# Patient Record
Sex: FEMALE | Race: WHITE | NOT HISPANIC OR LATINO | Employment: UNEMPLOYED | URBAN - METROPOLITAN AREA
[De-identification: names, ages, dates, MRNs, and addresses within clinical notes are randomized per-mention and may not be internally consistent; named-entity substitution may affect disease eponyms.]

---

## 2023-08-10 ENCOUNTER — HOSPITAL ENCOUNTER (INPATIENT)
Facility: HOSPITAL | Age: 63
LOS: 8 days | Discharge: NURSING, REHAB OR LONG-TERM CARE | DRG: 493 | End: 2023-08-18
Attending: EMERGENCY MEDICINE | Admitting: STUDENT IN AN ORGANIZED HEALTH CARE EDUCATION/TRAINING PROGRAM
Payer: COMMERCIAL

## 2023-08-10 ENCOUNTER — APPOINTMENT (OUTPATIENT)
Dept: RADIOLOGY | Facility: HOSPITAL | Age: 63
DRG: 493 | End: 2023-08-10
Attending: EMERGENCY MEDICINE
Payer: COMMERCIAL

## 2023-08-10 ENCOUNTER — APPOINTMENT (OUTPATIENT)
Dept: RADIOLOGY | Facility: HOSPITAL | Age: 63
DRG: 493 | End: 2023-08-10
Attending: STUDENT IN AN ORGANIZED HEALTH CARE EDUCATION/TRAINING PROGRAM
Payer: COMMERCIAL

## 2023-08-10 DIAGNOSIS — S82.841A CLOSED BIMALLEOLAR FRACTURE OF RIGHT ANKLE, INITIAL ENCOUNTER: ICD-10-CM

## 2023-08-10 PROBLEM — S82.891A CLOSED RIGHT ANKLE FRACTURE: Status: ACTIVE | Noted: 2023-08-10

## 2023-08-10 LAB
ABO/RH(D): NORMAL
ALBUMIN SERPL BCG-MCNC: 3.8 G/DL (ref 3.5–5.2)
ALP SERPL-CCNC: 51 U/L (ref 35–104)
ALT SERPL W P-5'-P-CCNC: 15 U/L (ref 0–50)
AMPHETAMINES UR QL SCN: ABNORMAL
ANION GAP SERPL CALCULATED.3IONS-SCNC: 11 MMOL/L (ref 7–15)
ANION GAP SERPL CALCULATED.3IONS-SCNC: 13 MMOL/L (ref 7–15)
ANTIBODY SCREEN: NEGATIVE
AST SERPL W P-5'-P-CCNC: 33 U/L (ref 0–45)
BARBITURATES UR QL SCN: ABNORMAL
BASOPHILS # BLD AUTO: 0.1 10E3/UL (ref 0–0.2)
BASOPHILS NFR BLD AUTO: 1 %
BENZODIAZ UR QL SCN: ABNORMAL
BILIRUB SERPL-MCNC: 0.3 MG/DL
BUN SERPL-MCNC: 27.5 MG/DL (ref 8–23)
BUN SERPL-MCNC: 27.7 MG/DL (ref 8–23)
BZE UR QL SCN: ABNORMAL
CALCIUM SERPL-MCNC: 10.2 MG/DL (ref 8.8–10.2)
CALCIUM SERPL-MCNC: 9.5 MG/DL (ref 8.8–10.2)
CANNABINOIDS UR QL SCN: ABNORMAL
CHLORIDE SERPL-SCNC: 102 MMOL/L (ref 98–107)
CHLORIDE SERPL-SCNC: 103 MMOL/L (ref 98–107)
CREAT SERPL-MCNC: 1.09 MG/DL (ref 0.51–0.95)
CREAT SERPL-MCNC: 1.13 MG/DL (ref 0.51–0.95)
DEPRECATED HCO3 PLAS-SCNC: 21 MMOL/L (ref 22–29)
DEPRECATED HCO3 PLAS-SCNC: 25 MMOL/L (ref 22–29)
EOSINOPHIL # BLD AUTO: 0 10E3/UL (ref 0–0.7)
EOSINOPHIL NFR BLD AUTO: 0 %
ERYTHROCYTE [DISTWIDTH] IN BLOOD BY AUTOMATED COUNT: 14.2 % (ref 10–15)
ERYTHROCYTE [DISTWIDTH] IN BLOOD BY AUTOMATED COUNT: 14.2 % (ref 10–15)
ETHANOL SERPL-MCNC: <0.01 G/DL
GFR SERPL CREATININE-BSD FRML MDRD: 55 ML/MIN/1.73M2
GFR SERPL CREATININE-BSD FRML MDRD: 58 ML/MIN/1.73M2
GLUCOSE SERPL-MCNC: 104 MG/DL (ref 70–99)
GLUCOSE SERPL-MCNC: 105 MG/DL (ref 70–99)
HCT VFR BLD AUTO: 31.3 % (ref 35–47)
HCT VFR BLD AUTO: 33.7 % (ref 35–47)
HGB BLD-MCNC: 10.1 G/DL (ref 11.7–15.7)
HGB BLD-MCNC: 10.9 G/DL (ref 11.7–15.7)
IMM GRANULOCYTES # BLD: 0 10E3/UL
IMM GRANULOCYTES NFR BLD: 0 %
INR PPP: 0.96 (ref 0.85–1.15)
INR PPP: 1 (ref 0.85–1.15)
LYMPHOCYTES # BLD AUTO: 1.4 10E3/UL (ref 0.8–5.3)
LYMPHOCYTES NFR BLD AUTO: 14 %
MCH RBC QN AUTO: 31.1 PG (ref 26.5–33)
MCH RBC QN AUTO: 31.2 PG (ref 26.5–33)
MCHC RBC AUTO-ENTMCNC: 32.3 G/DL (ref 31.5–36.5)
MCHC RBC AUTO-ENTMCNC: 32.3 G/DL (ref 31.5–36.5)
MCV RBC AUTO: 96 FL (ref 78–100)
MCV RBC AUTO: 97 FL (ref 78–100)
MONOCYTES # BLD AUTO: 0.6 10E3/UL (ref 0–1.3)
MONOCYTES NFR BLD AUTO: 6 %
NEUTROPHILS # BLD AUTO: 7.5 10E3/UL (ref 1.6–8.3)
NEUTROPHILS NFR BLD AUTO: 79 %
NRBC # BLD AUTO: 0 10E3/UL
NRBC BLD AUTO-RTO: 0 /100
OPIATES UR QL SCN: ABNORMAL
PCP QUAL URINE (ROCHE): ABNORMAL
PLATELET # BLD AUTO: 282 10E3/UL (ref 150–450)
PLATELET # BLD AUTO: 305 10E3/UL (ref 150–450)
POTASSIUM SERPL-SCNC: 3.7 MMOL/L (ref 3.4–5.3)
POTASSIUM SERPL-SCNC: 4.6 MMOL/L (ref 3.4–5.3)
PROT SERPL-MCNC: 6.5 G/DL (ref 6.4–8.3)
RBC # BLD AUTO: 3.25 10E6/UL (ref 3.8–5.2)
RBC # BLD AUTO: 3.49 10E6/UL (ref 3.8–5.2)
SARS-COV-2 RNA RESP QL NAA+PROBE: NEGATIVE
SODIUM SERPL-SCNC: 136 MMOL/L (ref 136–145)
SODIUM SERPL-SCNC: 139 MMOL/L (ref 136–145)
SPECIMEN EXPIRATION DATE: NORMAL
WBC # BLD AUTO: 11.3 10E3/UL (ref 4–11)
WBC # BLD AUTO: 9.6 10E3/UL (ref 4–11)

## 2023-08-10 PROCEDURE — 85610 PROTHROMBIN TIME: CPT | Performed by: EMERGENCY MEDICINE

## 2023-08-10 PROCEDURE — 2W3QX1Z IMMOBILIZATION OF RIGHT LOWER LEG USING SPLINT: ICD-10-PCS | Performed by: EMERGENCY MEDICINE

## 2023-08-10 PROCEDURE — 258N000003 HC RX IP 258 OP 636: Performed by: STUDENT IN AN ORGANIZED HEALTH CARE EDUCATION/TRAINING PROGRAM

## 2023-08-10 PROCEDURE — 85610 PROTHROMBIN TIME: CPT | Performed by: STUDENT IN AN ORGANIZED HEALTH CARE EDUCATION/TRAINING PROGRAM

## 2023-08-10 PROCEDURE — 36415 COLL VENOUS BLD VENIPUNCTURE: CPT | Performed by: EMERGENCY MEDICINE

## 2023-08-10 PROCEDURE — 250N000013 HC RX MED GY IP 250 OP 250 PS 637

## 2023-08-10 PROCEDURE — 250N000011 HC RX IP 250 OP 636

## 2023-08-10 PROCEDURE — 250N000011 HC RX IP 250 OP 636: Mod: JZ | Performed by: STUDENT IN AN ORGANIZED HEALTH CARE EDUCATION/TRAINING PROGRAM

## 2023-08-10 PROCEDURE — 73552 X-RAY EXAM OF FEMUR 2/>: CPT | Mod: RT

## 2023-08-10 PROCEDURE — 999N000157 HC STATISTIC RCP TIME EA 10 MIN

## 2023-08-10 PROCEDURE — 80307 DRUG TEST PRSMV CHEM ANLYZR: CPT | Performed by: EMERGENCY MEDICINE

## 2023-08-10 PROCEDURE — 73590 X-RAY EXAM OF LOWER LEG: CPT | Mod: RT

## 2023-08-10 PROCEDURE — 85027 COMPLETE CBC AUTOMATED: CPT | Performed by: STUDENT IN AN ORGANIZED HEALTH CARE EDUCATION/TRAINING PROGRAM

## 2023-08-10 PROCEDURE — 99222 1ST HOSP IP/OBS MODERATE 55: CPT | Performed by: STUDENT IN AN ORGANIZED HEALTH CARE EDUCATION/TRAINING PROGRAM

## 2023-08-10 PROCEDURE — 999N000065 XR ANKLE RIGHT G/E 3 VIEWS: Mod: RT

## 2023-08-10 PROCEDURE — 82077 ASSAY SPEC XCP UR&BREATH IA: CPT | Performed by: EMERGENCY MEDICINE

## 2023-08-10 PROCEDURE — 80053 COMPREHEN METABOLIC PANEL: CPT | Performed by: EMERGENCY MEDICINE

## 2023-08-10 PROCEDURE — 250N000013 HC RX MED GY IP 250 OP 250 PS 637: Performed by: HOSPITALIST

## 2023-08-10 PROCEDURE — C9803 HOPD COVID-19 SPEC COLLECT: HCPCS

## 2023-08-10 PROCEDURE — 36415 COLL VENOUS BLD VENIPUNCTURE: CPT | Performed by: STUDENT IN AN ORGANIZED HEALTH CARE EDUCATION/TRAINING PROGRAM

## 2023-08-10 PROCEDURE — 71045 X-RAY EXAM CHEST 1 VIEW: CPT

## 2023-08-10 PROCEDURE — 85025 COMPLETE CBC W/AUTO DIFF WBC: CPT | Performed by: EMERGENCY MEDICINE

## 2023-08-10 PROCEDURE — 87635 SARS-COV-2 COVID-19 AMP PRB: CPT | Performed by: EMERGENCY MEDICINE

## 2023-08-10 PROCEDURE — 250N000011 HC RX IP 250 OP 636: Performed by: FAMILY MEDICINE

## 2023-08-10 PROCEDURE — 99285 EMERGENCY DEPT VISIT HI MDM: CPT | Mod: 25

## 2023-08-10 PROCEDURE — 73610 X-RAY EXAM OF ANKLE: CPT | Mod: RT

## 2023-08-10 PROCEDURE — 250N000013 HC RX MED GY IP 250 OP 250 PS 637: Performed by: STUDENT IN AN ORGANIZED HEALTH CARE EDUCATION/TRAINING PROGRAM

## 2023-08-10 PROCEDURE — 86850 RBC ANTIBODY SCREEN: CPT | Performed by: STUDENT IN AN ORGANIZED HEALTH CARE EDUCATION/TRAINING PROGRAM

## 2023-08-10 PROCEDURE — 73502 X-RAY EXAM HIP UNI 2-3 VIEWS: CPT

## 2023-08-10 PROCEDURE — 93005 ELECTROCARDIOGRAM TRACING: CPT | Performed by: STUDENT IN AN ORGANIZED HEALTH CARE EDUCATION/TRAINING PROGRAM

## 2023-08-10 PROCEDURE — 86901 BLOOD TYPING SEROLOGIC RH(D): CPT | Performed by: STUDENT IN AN ORGANIZED HEALTH CARE EDUCATION/TRAINING PROGRAM

## 2023-08-10 PROCEDURE — 29515 APPLICATION SHORT LEG SPLINT: CPT | Mod: RT

## 2023-08-10 PROCEDURE — 120N000001 HC R&B MED SURG/OB

## 2023-08-10 PROCEDURE — 250N000011 HC RX IP 250 OP 636: Performed by: EMERGENCY MEDICINE

## 2023-08-10 RX ORDER — FAMOTIDINE 20 MG/1
1 TABLET, FILM COATED ORAL AT BEDTIME
COMMUNITY
Start: 2023-06-26

## 2023-08-10 RX ORDER — NALOXONE HYDROCHLORIDE 0.4 MG/ML
0.2 INJECTION, SOLUTION INTRAMUSCULAR; INTRAVENOUS; SUBCUTANEOUS
Status: DISCONTINUED | OUTPATIENT
Start: 2023-08-10 | End: 2023-08-18 | Stop reason: HOSPADM

## 2023-08-10 RX ORDER — PROCHLORPERAZINE MALEATE 10 MG
10 TABLET ORAL EVERY 6 HOURS PRN
Status: DISCONTINUED | OUTPATIENT
Start: 2023-08-10 | End: 2023-08-15

## 2023-08-10 RX ORDER — ATORVASTATIN CALCIUM 20 MG/1
1 TABLET, FILM COATED ORAL DAILY
COMMUNITY
Start: 2023-08-09

## 2023-08-10 RX ORDER — NICOTINE 21 MG/24HR
1 PATCH, TRANSDERMAL 24 HOURS TRANSDERMAL DAILY
Status: DISCONTINUED | OUTPATIENT
Start: 2023-08-10 | End: 2023-08-10

## 2023-08-10 RX ORDER — NALOXONE HYDROCHLORIDE 0.4 MG/ML
0.4 INJECTION, SOLUTION INTRAMUSCULAR; INTRAVENOUS; SUBCUTANEOUS
Status: DISCONTINUED | OUTPATIENT
Start: 2023-08-10 | End: 2023-08-18 | Stop reason: HOSPADM

## 2023-08-10 RX ORDER — HYDRALAZINE HYDROCHLORIDE 20 MG/ML
10 INJECTION INTRAMUSCULAR; INTRAVENOUS EVERY 4 HOURS PRN
Status: DISCONTINUED | OUTPATIENT
Start: 2023-08-10 | End: 2023-08-18 | Stop reason: HOSPADM

## 2023-08-10 RX ORDER — FLUTICASONE PROPIONATE AND SALMETEROL 250; 50 UG/1; UG/1
1 POWDER RESPIRATORY (INHALATION) DAILY PRN
COMMUNITY
Start: 2023-06-26

## 2023-08-10 RX ORDER — OXYCODONE HYDROCHLORIDE 5 MG/1
10 TABLET ORAL EVERY 4 HOURS PRN
Status: DISCONTINUED | OUTPATIENT
Start: 2023-08-10 | End: 2023-08-11

## 2023-08-10 RX ORDER — BUSPIRONE HYDROCHLORIDE 15 MG/1
15 TABLET ORAL 2 TIMES DAILY
Status: DISCONTINUED | OUTPATIENT
Start: 2023-08-10 | End: 2023-08-18 | Stop reason: HOSPADM

## 2023-08-10 RX ORDER — BUSPIRONE HYDROCHLORIDE 15 MG/1
1 TABLET ORAL 2 TIMES DAILY
COMMUNITY

## 2023-08-10 RX ORDER — HYDROMORPHONE HCL IN WATER/PF 6 MG/30 ML
0.4 PATIENT CONTROLLED ANALGESIA SYRINGE INTRAVENOUS
Status: DISCONTINUED | OUTPATIENT
Start: 2023-08-10 | End: 2023-08-11

## 2023-08-10 RX ORDER — ACETAMINOPHEN 325 MG/1
650 TABLET ORAL EVERY 4 HOURS PRN
Status: DISCONTINUED | OUTPATIENT
Start: 2023-08-13 | End: 2023-08-11

## 2023-08-10 RX ORDER — HYDROMORPHONE HCL IN WATER/PF 6 MG/30 ML
0.2 PATIENT CONTROLLED ANALGESIA SYRINGE INTRAVENOUS
Status: DISCONTINUED | OUTPATIENT
Start: 2023-08-10 | End: 2023-08-11

## 2023-08-10 RX ORDER — CELECOXIB 200 MG/1
1 CAPSULE ORAL DAILY
Status: ON HOLD | COMMUNITY
Start: 2023-06-26 | End: 2023-08-17

## 2023-08-10 RX ORDER — PROCHLORPERAZINE 25 MG
25 SUPPOSITORY, RECTAL RECTAL EVERY 12 HOURS PRN
Status: DISCONTINUED | OUTPATIENT
Start: 2023-08-10 | End: 2023-08-15

## 2023-08-10 RX ORDER — BACLOFEN 10 MG/1
1 TABLET ORAL 3 TIMES DAILY
COMMUNITY

## 2023-08-10 RX ORDER — PRAMIPEXOLE DIHYDROCHLORIDE 0.12 MG/1
1 TABLET ORAL 2 TIMES DAILY PRN
COMMUNITY
Start: 2023-07-08

## 2023-08-10 RX ORDER — FENTANYL CITRATE 50 UG/ML
INJECTION, SOLUTION INTRAMUSCULAR; INTRAVENOUS
Status: COMPLETED
Start: 2023-08-10 | End: 2023-08-10

## 2023-08-10 RX ORDER — ACETAMINOPHEN 500 MG
2 TABLET ORAL 2 TIMES DAILY PRN
Status: ON HOLD | COMMUNITY
End: 2023-08-16

## 2023-08-10 RX ORDER — ZOLPIDEM TARTRATE 5 MG/1
10 TABLET ORAL
Status: COMPLETED | OUTPATIENT
Start: 2023-08-10 | End: 2023-08-10

## 2023-08-10 RX ORDER — FENTANYL CITRATE 50 UG/ML
25 INJECTION, SOLUTION INTRAMUSCULAR; INTRAVENOUS ONCE
Status: COMPLETED | OUTPATIENT
Start: 2023-08-10 | End: 2023-08-10

## 2023-08-10 RX ORDER — IBUPROFEN 200 MG
3 TABLET ORAL DAILY PRN
Status: ON HOLD | COMMUNITY
End: 2023-08-17

## 2023-08-10 RX ORDER — LIDOCAINE 40 MG/G
CREAM TOPICAL
Status: DISCONTINUED | OUTPATIENT
Start: 2023-08-10 | End: 2023-08-15

## 2023-08-10 RX ORDER — DULOXETIN HYDROCHLORIDE 60 MG/1
1 CAPSULE, DELAYED RELEASE ORAL 2 TIMES DAILY
COMMUNITY
Start: 2023-06-26

## 2023-08-10 RX ORDER — PROPOFOL 10 MG/ML
20 INJECTION, EMULSION INTRAVENOUS
Status: DISCONTINUED | OUTPATIENT
Start: 2023-08-10 | End: 2023-08-12

## 2023-08-10 RX ORDER — BUSPIRONE HYDROCHLORIDE 15 MG/1
15 TABLET ORAL 2 TIMES DAILY
Status: DISCONTINUED | OUTPATIENT
Start: 2023-08-10 | End: 2023-08-10

## 2023-08-10 RX ORDER — BACLOFEN 10 MG/1
10 TABLET ORAL 3 TIMES DAILY
Status: DISCONTINUED | OUTPATIENT
Start: 2023-08-10 | End: 2023-08-18 | Stop reason: HOSPADM

## 2023-08-10 RX ORDER — ONDANSETRON 2 MG/ML
4 INJECTION INTRAMUSCULAR; INTRAVENOUS EVERY 6 HOURS PRN
Status: DISCONTINUED | OUTPATIENT
Start: 2023-08-10 | End: 2023-08-12

## 2023-08-10 RX ORDER — GABAPENTIN 300 MG/1
600 CAPSULE ORAL 3 TIMES DAILY
Status: DISCONTINUED | OUTPATIENT
Start: 2023-08-10 | End: 2023-08-12

## 2023-08-10 RX ORDER — SODIUM CHLORIDE 9 MG/ML
INJECTION, SOLUTION INTRAVENOUS CONTINUOUS
Status: DISCONTINUED | OUTPATIENT
Start: 2023-08-10 | End: 2023-08-11

## 2023-08-10 RX ORDER — ACETAMINOPHEN 325 MG/1
975 TABLET ORAL EVERY 8 HOURS
Status: DISCONTINUED | OUTPATIENT
Start: 2023-08-10 | End: 2023-08-11

## 2023-08-10 RX ORDER — ALBUTEROL SULFATE 90 UG/1
1 AEROSOL, METERED RESPIRATORY (INHALATION) EVERY 6 HOURS PRN
COMMUNITY
Start: 2023-06-26

## 2023-08-10 RX ORDER — GABAPENTIN 300 MG/1
2 CAPSULE ORAL 3 TIMES DAILY
Status: ON HOLD | COMMUNITY
End: 2023-08-17

## 2023-08-10 RX ORDER — OXYCODONE HYDROCHLORIDE 5 MG/1
5 TABLET ORAL EVERY 4 HOURS PRN
Status: DISCONTINUED | OUTPATIENT
Start: 2023-08-10 | End: 2023-08-11

## 2023-08-10 RX ORDER — LORATADINE 10 MG/1
1 TABLET ORAL DAILY
COMMUNITY
Start: 2023-03-05

## 2023-08-10 RX ORDER — ONDANSETRON 2 MG/ML
4 INJECTION INTRAMUSCULAR; INTRAVENOUS ONCE
Status: COMPLETED | OUTPATIENT
Start: 2023-08-10 | End: 2023-08-10

## 2023-08-10 RX ORDER — ONDANSETRON 4 MG/1
4 TABLET, ORALLY DISINTEGRATING ORAL EVERY 6 HOURS PRN
Status: DISCONTINUED | OUTPATIENT
Start: 2023-08-10 | End: 2023-08-16

## 2023-08-10 RX ORDER — ZOLPIDEM TARTRATE 10 MG/1
1 TABLET ORAL
Status: ON HOLD | COMMUNITY
Start: 2023-08-04 | End: 2023-08-18

## 2023-08-10 RX ORDER — PROPOFOL 10 MG/ML
60 INJECTION, EMULSION INTRAVENOUS ONCE
Status: COMPLETED | OUTPATIENT
Start: 2023-08-10 | End: 2023-08-10

## 2023-08-10 RX ORDER — POLYETHYLENE GLYCOL 3350 17 G/17G
17 POWDER, FOR SOLUTION ORAL DAILY PRN
Status: DISCONTINUED | OUTPATIENT
Start: 2023-08-10 | End: 2023-08-12

## 2023-08-10 RX ORDER — FENTANYL CITRATE 50 UG/ML
50 INJECTION, SOLUTION INTRAMUSCULAR; INTRAVENOUS ONCE
Status: COMPLETED | OUTPATIENT
Start: 2023-08-10 | End: 2023-08-10

## 2023-08-10 RX ORDER — NICOTINE 21 MG/24HR
1 PATCH, TRANSDERMAL 24 HOURS TRANSDERMAL DAILY
Status: DISCONTINUED | OUTPATIENT
Start: 2023-08-10 | End: 2023-08-18 | Stop reason: HOSPADM

## 2023-08-10 RX ORDER — BETAMETHASONE DIPROPIONATE 0.05 %
OINTMENT (GRAM) TOPICAL 2 TIMES DAILY
Status: ON HOLD | COMMUNITY
Start: 2023-07-05 | End: 2023-08-18

## 2023-08-10 RX ORDER — VITAMIN B COMPLEX
25 TABLET ORAL DAILY
Status: DISCONTINUED | OUTPATIENT
Start: 2023-08-10 | End: 2023-08-18 | Stop reason: HOSPADM

## 2023-08-10 RX ADMIN — BACLOFEN 10 MG: 10 TABLET ORAL at 18:05

## 2023-08-10 RX ADMIN — PROPOFOL 60 MG: 10 INJECTION, EMULSION INTRAVENOUS at 14:06

## 2023-08-10 RX ADMIN — HYDROMORPHONE HYDROCHLORIDE 0.2 MG: 0.2 INJECTION, SOLUTION INTRAMUSCULAR; INTRAVENOUS; SUBCUTANEOUS at 08:25

## 2023-08-10 RX ADMIN — HYDROMORPHONE HYDROCHLORIDE 0.4 MG: 0.2 INJECTION, SOLUTION INTRAMUSCULAR; INTRAVENOUS; SUBCUTANEOUS at 13:06

## 2023-08-10 RX ADMIN — ACETAMINOPHEN 975 MG: 325 TABLET ORAL at 05:27

## 2023-08-10 RX ADMIN — OXYCODONE HYDROCHLORIDE 10 MG: 5 TABLET ORAL at 10:20

## 2023-08-10 RX ADMIN — NICOTINE 1 PATCH: 14 PATCH, EXTENDED RELEASE TRANSDERMAL at 14:25

## 2023-08-10 RX ADMIN — BUSPIRONE HYDROCHLORIDE 15 MG: 15 TABLET ORAL at 21:09

## 2023-08-10 RX ADMIN — ACETAMINOPHEN 975 MG: 325 TABLET ORAL at 14:25

## 2023-08-10 RX ADMIN — PROPOFOL 20 MG: 10 INJECTION, EMULSION INTRAVENOUS at 13:42

## 2023-08-10 RX ADMIN — OXYCODONE HYDROCHLORIDE 10 MG: 5 TABLET ORAL at 16:39

## 2023-08-10 RX ADMIN — OXYCODONE HYDROCHLORIDE 5 MG: 5 TABLET ORAL at 03:20

## 2023-08-10 RX ADMIN — SODIUM CHLORIDE: 9 INJECTION, SOLUTION INTRAVENOUS at 14:27

## 2023-08-10 RX ADMIN — FENTANYL CITRATE 25 MCG: 50 INJECTION, SOLUTION INTRAMUSCULAR; INTRAVENOUS at 00:49

## 2023-08-10 RX ADMIN — HYDROMORPHONE HYDROCHLORIDE 0.4 MG: 0.2 INJECTION, SOLUTION INTRAMUSCULAR; INTRAVENOUS; SUBCUTANEOUS at 10:51

## 2023-08-10 RX ADMIN — FENTANYL CITRATE 50 MCG: 50 INJECTION, SOLUTION INTRAMUSCULAR; INTRAVENOUS at 00:42

## 2023-08-10 RX ADMIN — PROPOFOL 20 MG: 10 INJECTION, EMULSION INTRAVENOUS at 13:41

## 2023-08-10 RX ADMIN — HYDROMORPHONE HYDROCHLORIDE 0.4 MG: 0.2 INJECTION, SOLUTION INTRAMUSCULAR; INTRAVENOUS; SUBCUTANEOUS at 05:27

## 2023-08-10 RX ADMIN — HYDROMORPHONE HYDROCHLORIDE 0.4 MG: 0.2 INJECTION, SOLUTION INTRAMUSCULAR; INTRAVENOUS; SUBCUTANEOUS at 23:31

## 2023-08-10 RX ADMIN — GABAPENTIN 600 MG: 300 CAPSULE ORAL at 21:09

## 2023-08-10 RX ADMIN — Medication 25 MCG: at 18:05

## 2023-08-10 RX ADMIN — OXYCODONE HYDROCHLORIDE 10 MG: 5 TABLET ORAL at 21:09

## 2023-08-10 RX ADMIN — HYDROMORPHONE HYDROCHLORIDE 0.2 MG: 0.2 INJECTION, SOLUTION INTRAMUSCULAR; INTRAVENOUS; SUBCUTANEOUS at 03:20

## 2023-08-10 RX ADMIN — BACLOFEN 10 MG: 10 TABLET ORAL at 22:23

## 2023-08-10 RX ADMIN — ACETAMINOPHEN 975 MG: 325 TABLET ORAL at 21:09

## 2023-08-10 RX ADMIN — SODIUM CHLORIDE: 9 INJECTION, SOLUTION INTRAVENOUS at 05:27

## 2023-08-10 RX ADMIN — ZOLPIDEM TARTRATE 10 MG: 5 TABLET ORAL at 22:23

## 2023-08-10 ASSESSMENT — ACTIVITIES OF DAILY LIVING (ADL)
ADLS_ACUITY_SCORE: 37
ADLS_ACUITY_SCORE: 41
ADLS_ACUITY_SCORE: 35
ADLS_ACUITY_SCORE: 35
ADLS_ACUITY_SCORE: 37
ADLS_ACUITY_SCORE: 35
ADLS_ACUITY_SCORE: 41
ADLS_ACUITY_SCORE: 35

## 2023-08-10 NOTE — MEDICATION SCRIBE - ADMISSION MEDICATION HISTORY
Medication Scribe Admission Medication History    Admission medication history is complete. The information provided in this note is only as accurate as the sources available at the time of the update.    Medication reconciliation/reorder completed by provider prior to medication history? No    Information Source(s): Patient via in-person    Pertinent Information:     Changes made to PTA medication list:  Added: Gabapentin 300 mg capsule, Baclofen 10 mg tablet, Buspirone 15 mg tablet, Vitamin D3 25 mcg (per outside fill history and patient confirmed)  Deleted: Duloxetine (per patinet)  Changed: None    Medication Affordability:  Not including over the counter (OTC) medications, was there a time in the past 3 months when you did not take your medications as prescribed because of cost?: No    Allergies reviewed with patient and updates made in EHR: yes    Medication History Completed By: Birdie Tariq 8/10/2023 3:22 AM    Prior to Admission medications    Medication Sig Last Dose Taking? Auth Provider Long Term End Date   baclofen (LIORESAL) 10 MG tablet Take 10 mg by mouth 3 times daily 8/9/2023 at pm Yes Reported, Patient     busPIRone (BUSPAR) 15 MG tablet Take 15 mg by mouth 3 times daily 8/9/2023 at pm Yes Reported, Patient Yes    cholecalciferol (VITAMIN D3) 25 mcg (1000 units) capsule Take 1 capsule by mouth daily 8/9/2023 at am Yes Reported, Patient     gabapentin (NEURONTIN) 300 MG capsule Take 300 mg by mouth 3 times daily 8/9/2023 at pm Yes Reported, Patient Yes

## 2023-08-10 NOTE — H&P
[facility]    History and Physical - Hospitalist Service       Date of Admission:  8/10/2023    Assessment & Plan      Everett Dixon is a 63 year old female admitted on 8/10/2023. She presented to the ER after a mechanical fall accident.  Right ankle x-ray with displaced bimalleolar ankle fracture with lateral talar shift and angulation.    Mechanical fall  Right foot pain  Closed right ankle fracture  -Sustained a mechanical fall accident in her niece's house while getting out of bathroom she was tipped and fell.  Right ankle x-ray significant for displaced bimalleolar ankle fracture  -Had a remote history of left ankle fracture in 2003  -[facility] has been contacted by ER attending  -Denies having history of heart disease, denies having chest pain currently.  EKG is done.  -She can undergo surgery with no additional tests  -Pain control with Tylenol, oral oxycodone and IV Dilaudid  -N.p.o.    Active smoker  -Counseling, nicotine patch or gum       Diet:  N.p.o.  DVT Prophylaxis: Pneumatic Compression Devices  [place] Catheter: Not present  Lines: None     Cardiac Monitoring: None  Code Status:  Full code    Clinically Significant Risk Factors Present on Admission                                Disposition Plan      Expected Discharge Date: 08/11/2023                  Madi Mc MD  Hospitalist Service  [facility]  Securely message with [employer] (more info)  Text page via [employer] Paging/Directory     ______________________________________________________________________    Chief Complaint   Right foot pain/few hours    History is obtained from the patient and a relative who was present at the bedside    History of Present Illness   Everett Dixon is a 63 year old female with past medical history significant for remote history of bilateral PE, left ankle fracture, active smoker.  She presented with the above complaint.  Patient stated that she tripped on the  floor when she gets out of a bathroom.  She denies hitting her head.  X-ray revealed right displaced bimalleolar ankle fracture.  Ortho  team has been contacted.  And will be admitted for further management.      Past Medical History    History reviewed. No pertinent past medical history.    Past Surgical History   History reviewed. No pertinent surgical history.    Prior to Admission Medications   Prior to Admission Medications   Prescriptions Last Dose Informant Patient Reported? Taking?   DULOXETINE HCL PO   Yes Yes      Facility-Administered Medications: None           Physical Exam   Vital Signs: Temp: 97.8  F (36.6  C)   BP: 128/72 Pulse: 69   Resp: 16 SpO2: 91 % O2 Device: None (Room air)    Weight: 134 lbs 0 oz    General Appearance: No distress noted.  in pain  Respiratory: Good air entry bilaterally  Cardiovascular: S1 and S2 well heard, no murmur or gallop  GI: Soft abdomen, no tenderness, normoactive bowel sounds  Skin: Intact and warm      Medical Decision Making       60 MINUTES SPENT BY ME on the date of service doing chart review, history, exam, documentation & further activities per the note.      Data

## 2023-08-10 NOTE — CONSULTS
ORTHOPEDIC CONSULTATION    Consultation  Everett Dixon,  1960, MRN [id number]    Closed bimalleolar fracture of right ankle, initial encounter [S82.433T]  Closed right ankle fracture [S82.346O]    PCP: No Ref-Primary, Physician, None   Code status:  Full Code       Extended Emergency Contact Information  Primary Emergency Contact: ADELFO MENDOZA  Mobile Phone: [phone]  Relation: Niece  Secondary Emergency Contact: LESLIE LAMBERT  Mobile Phone: [phone]  Relation: Sister         IMPRESSION:  Right ankle bimalleolar fracture, dislocation    Today had a pleasant discussion with the patient regarding her assessment.  We discussed her presentation and radiographic images.  She understands that she has a fracture dislocation of her right ankle.  The splint that was placed by EMT is not appropriate for providing reduction and soft tissue rest.  Her medial skin overlying the medial malleolus fracture demonstrates some tenting, concerning for possible development of an open fracture if this is not appropriately reduced.  Following this discussion, she agrees with the plan for closed reduction attempt of her right ankle fracture dislocation to allow for soft tissue rest prior to surgical discussion with the foot and ankle specialist in the future.  Closed reduction right ankle fracture dislocation was performed in the emergency department with hematoma block and conscious sedation.  No known complications.  Please refer to procedure note for details.      PLAN:  Currently a hospitalist admit  -Nonweightbearing right lower extremity  -Postreduction x-rays  -Okay with diet, no surgical plans today  -PT/OT for mobilization  -Pain control per primary  -Follow-up outpatient with [facility] specialist to discuss surgical timing.           CHIEF COMPLAINT: Closed bimalleolar fracture of right ankle, initial encounter    HISTORY OF PRESENT ILLNESS:  The patient is a pleasant 63-year-old female with past medical  history significant for active smoking (quarter pack per day), emphysema (not on home oxygen), prior left ankle fracture requiring surgical management, known history of bilateral PE not currently on anticoagulation who sustained an injury to her right ankle.  She presented via EMS with a splint placed for transport.  She admitted to immediate pain and deformity.  Inability to weight-bear.  She denied hitting her head, loss of consciousness.  Right ankle fracture was diagnosed with imaging and orthopedic team was consulted for further discussion of management.      ALLERGIES:   Review of patient's allergies indicates   Allergies   Allergen Reactions    Penicillins Rash         MEDICATIONS UPON ADMISSION:  Medications were reviewed.  They include:   (Not in a hospital admission)        SOCIAL HISTORY:   She is a smoker      FAMILY HISTORY:  family history is not on file.      REVIEW OF SYSTEMS:   Reviewed with patient. See HPI, otherwise negative       PHYSICAL EXAMINATION:  Vitals: Temp:  [97.8  F (36.6  C)-98.6  F (37  C)] 98.6  F (37  C)  Pulse:  [50-76] 76  Resp:  [12-26] 17  BP: (110-170)/(68-96) 148/86  SpO2:  [91 %-100 %] 100 %  General: Nonacute distress, in pain, alert and oriented x 3  Cardiac: Regular heart rate to peripheral pulse  Pulmonary: Nonlabored breathing with 2 L O2 supplement via nasal cannula  Focused examination of the right lower extremity:  -Nonmolded lower extremity splint removed  -Skin tenting over the medial malleolus, closed  -No sign of open injury  -Digits warm well-perfused, DP pulse  -Weakly fires EHL, FHL and wiggles lesser digits limited by pain  -Sensation intact light touch in the superficial peroneal, deep peroneal, tibial, sural, saphenous nerve distributions of the foot      RADIOGRAPHIC EVALUATION:  X-ray right ankle 3 view (performed today): Personally viewed today.  Reveals bimalleolar right ankle fracture dislocation with lateral subluxation of the talus under the tibia.   Splint material in place without reduction.    PERTINENT LABS:  Reviewed    Ochoa Ferraro MD, MD  [facility]  Date: 8/10/2023  Time: 2:03 PM    CC1:   Halle Desai MD    CC2:   No Ref-Primary, Physician

## 2023-08-10 NOTE — TREATMENT PLAN
Orthopedic note:  62yo smoker with displaced/subluxed right bimalleolar ankle fracture. Poor reduction in splint currently.    Plan for repeat closed reduction and splinting today by orthopedic service to allow for soft tissue rest. Anticipate discharge and outpatient follow up for surgical fixation discussion with foot/ankle specialist.    Ochoa Ferraro MD  [facility]

## 2023-08-10 NOTE — ED NOTES
Bed: JNED-26  Expected date: 8/10/23  Expected time: 12:06 AM  Means of arrival: Ambulance  Comments:  [place]  63 F--fall, no thinners fent nasally, obvious deformity of ankle

## 2023-08-10 NOTE — ED NOTES
Pt's nephew asking for MD to speak with patient re: POC as pt was sleeping when MD was at bedside earlier. MD paged.

## 2023-08-10 NOTE — PROCEDURES
Orthopedic procedure in the emergency department:  Patient is a 63-year-old female with right ankle bimalleolar fracture dislocation.  Following discussion with the patient and obtaining verbal consent, she elected to proceed with attempted closed reduction with hematoma block.  After correctly identifying the patient, laterality and procedure to be performed, we sterilely placed 10 cc of 1% lidocaine plain within the tibiotalar joint on the right.  We allowed this to set, noting that the patient reported slightly improved pain control.  However, with attempted closed reduction the patient did not tolerate this.  Therefore, with the help of the emergency department staff and team we elected to proceed with a conscious sedation.  We obtained written verbal consent for the same procedure as above.  Propofol was administered, and closed reduction of the right ankle was performed.  The well-padded right lower extremity splint was held in place to allow for proper reduction until dry.  Postreduction x-rays confirmed appropriately reduced right ankle fracture dislocation.  At the end of the procedure, the patient's digits remained warm and well-perfused.  Her sensation remained intact.  There were no known complications.      Ochoa Ferraro MD  [facility]

## 2023-08-10 NOTE — ED NOTES
EMERGENCY DEPARTMENT PROCEDURE NOTE        ED COURSE AND MEDICAL DECISION MAKING  1:33 PM Requested by orthopedics to perform sedation for reduction and splinting fracture.  Patient is appropriately n.p.o., allergy to penicillin.    PROCEDURE: Sedation   INDICATIONS: Sedation is required to allow for fracture reduction (right ankle)   SEDATION PROVIDER: Dr Brendan Gusman   PROCEDURE PROVIDER: Mabel Ferraro   LEVEL OF SEDATION: Deep Sedation    Defined as:  Minimal = Normal response to verbal  Moderate = Responds to verbal and light tactile stimulation  Deep = Responds after repeated painful stimulation   CONSENT: Risks, benefits and alternatives were discussed with and Written consent was obtained from Patient.   PROCEDURE SPECIFIC CHECKLIST COMPLETED:   Yes   LAST ORAL INTAKE: Full Liquids > 4 hours   ASA CLASS: 2 - Mild systemic disease   MALLAMPATI:  II - Faucial pillars and soft palate may be seen, but uvula is masked by the base of the tongue   TIME OUT: Universal protocol was followed. TIME OUT conducted just prior to starting procedure confirmed patient identity, site/side, procedure, patient position, and availability of correct equipment. Yes    Immediately prior to initiation of sedation, reassessment of clinical condition was performed which was unchanged.   MEDICATIONS: Propofol, 80 mg, IV   MONITORING: Monitoring consisted of:   heart rate, cardiac monitor, continuous pulse oximeter, continuous capnometry (end tidal CO2), frequent blood pressure checks, level of consciousness checks, IV access, constant attendance by RN until patient is recovered, constant attendance by MD until patient is stable, and intubation and emergency airway equipment available   RESPONSE: vital signs stable, airway patent, O2 saturations remained >92%, and jaw thrust required to maintain patent airway   POST-SEDATION ASSESSMENT/NOTE: Lowest level oxygen saturation reached was 88%.    Post procedure patient was alert and  responds to verbal stimuli    Patient was monitored during recovery and returned to pre-procedure baseline.   ADDITIONAL MD ASSISTANCE: None   TOTAL MD DRUG ADMINISTRATION / MONITORING TIME: 15 minutes   COMPLICATIONS:  Patient tolerated procedure well, without complication         FINAL IMPRESSION    1. Closed bimalleolar fracture of right ankle, initial encounter        ED MEDS  Medications   lidocaine 1 % 0.1-1 mL (has no administration in time range)   lidocaine (LMX4) cream (has no administration in time range)   sodium chloride (PF) 0.9% PF flush 3 mL (3 mLs Intracatheter Not Given 8/10/23 1022)   sodium chloride (PF) 0.9% PF flush 3 mL (has no administration in time range)   melatonin tablet 1 mg (has no administration in time range)   HOLD: ALL Anticoagulant medications until AFTER surgery (has no administration in time range)   ondansetron (ZOFRAN ODT) ODT tab 4 mg (has no administration in time range)     Or   ondansetron (ZOFRAN) injection 4 mg (has no administration in time range)   prochlorperazine (COMPAZINE) injection 10 mg (has no administration in time range)     Or   prochlorperazine (COMPAZINE) tablet 10 mg (has no administration in time range)     Or   prochlorperazine (COMPAZINE) suppository 25 mg (has no administration in time range)   polyethylene glycol (MIRALAX) Packet 17 g (has no administration in time range)   HYDROmorphone (DILAUDID) injection 0.2 mg ( Intravenous See Alternative 8/10/23 1306)     Or   HYDROmorphone (DILAUDID) injection 0.4 mg (0.4 mg Intravenous $Given 8/10/23 1306)   oxyCODONE (ROXICODONE) tablet 5 mg ( Oral See Alternative 8/10/23 1020)     Or   oxyCODONE (ROXICODONE) tablet 10 mg (10 mg Oral $Given 8/10/23 1020)   acetaminophen (TYLENOL) tablet 975 mg (975 mg Oral $Given 8/10/23 0527)   acetaminophen (TYLENOL) tablet 650 mg (has no administration in time range)   naloxone (NARCAN) injection 0.2 mg (has no administration in time range)     Or   naloxone (NARCAN)  injection 0.4 mg (has no administration in time range)     Or   naloxone (NARCAN) injection 0.2 mg (has no administration in time range)     Or   naloxone (NARCAN) injection 0.4 mg (has no administration in time range)   sodium chloride 0.9% infusion ( Intravenous Rate/Dose Verify 8/10/23 1104)   nicotine Patch in Place (0 each Transdermal Hold 8/10/23 0657)   nicotine (NICODERM CQ) 14 MG/24HR 24 hr patch 1 patch (0 patches Transdermal Hold 8/10/23 0657)   propofol (DIPRIVAN) injection 10 mg/mL vial (has no administration in time range)   propofol (DIPRIVAN) injection 10 mg/mL vial (has no administration in time range)   fentaNYL (PF) (SUBLIMAZE) injection 50 mcg (50 mcg Intravenous $Given 8/10/23 0042)   fentaNYL (PF) (SUBLIMAZE) injection 25 mcg (25 mcg Intravenous $Given 8/10/23 0049)   ondansetron (ZOFRAN) injection 4 mg (4 mg Intravenous Not Given 8/10/23 0245)       LAB  Labs Ordered and Resulted from Time of ED Arrival to Time of ED Departure   COMPREHENSIVE METABOLIC PANEL - Abnormal       Result Value    Sodium 136      Potassium 3.7      Chloride 102      Carbon Dioxide (CO2) 21 (*)     Anion Gap 13      Urea Nitrogen 27.7 (*)     Creatinine 1.09 (*)     Calcium 9.5      Glucose 104 (*)     Alkaline Phosphatase 51      AST 33      ALT 15      Protein Total 6.5      Albumin 3.8      Bilirubin Total 0.3      GFR Estimate 58 (*)    CBC WITH PLATELETS AND DIFFERENTIAL - Abnormal    WBC Count 9.6      RBC Count 3.25 (*)     Hemoglobin 10.1 (*)     Hematocrit 31.3 (*)     MCV 96      MCH 31.1      MCHC 32.3      RDW 14.2      Platelet Count 282      % Neutrophils 79      % Lymphocytes 14      % Monocytes 6      % Eosinophils 0      % Basophils 1      % Immature Granulocytes 0      NRBCs per 100 WBC 0      Absolute Neutrophils 7.5      Absolute Lymphocytes 1.4      Absolute Monocytes 0.6      Absolute Eosinophils 0.0      Absolute Basophils 0.1      Absolute Immature Granulocytes 0.0      Absolute NRBCs 0.0      BASIC METABOLIC PANEL - Abnormal    Sodium 139      Potassium 4.6      Chloride 103      Carbon Dioxide (CO2) 25      Anion Gap 11      Urea Nitrogen 27.5 (*)     Creatinine 1.13 (*)     Calcium 10.2      Glucose 105 (*)     GFR Estimate 55 (*)    CBC WITH PLATELETS - Abnormal    WBC Count 11.3 (*)     RBC Count 3.49 (*)     Hemoglobin 10.9 (*)     Hematocrit 33.7 (*)     MCV 97      MCH 31.2      MCHC 32.3      RDW 14.2      Platelet Count 305     DRUG ABUSE SCREEN 77 URINE (FL, RH, SH) - Abnormal    Amphetamines Urine Screen Negative      Barbituates Urine Screen Negative      Benzodiazepine Urine Screen Negative      Cannabinoids Urine Screen Positive (*)     Opiates Urine Screen Negative      PCP Urine Screen Negative      Cocaine Urine Screen Negative     INR - Normal    INR 1.00     COVID-19 VIRUS (CORONAVIRUS) BY PCR - Normal    SARS CoV2 PCR Negative     ETHYL ALCOHOL LEVEL - Normal    Alcohol ethyl <0.01     INR - Normal    INR 0.96     TYPE AND SCREEN, ADULT    ABO/RH(D) A POS      Antibody Screen Negative      SPECIMEN EXPIRATION DATE [id number]     ABO/RH TYPE AND SCREEN       EKG  Performed at 3:32 AM independently interpreted by me sinus rhythm with nonspecific ST changes, no acute ischemic changes, , QTc 471.  No prior for comparison.      Brendan Gusman MD  [facility] EMERGENCY DEPARTMENT  [facility] [facility] [address] [phone]       Brendan Gusman MD  08/10/23 9572

## 2023-08-10 NOTE — PROGRESS NOTES
RT assisted on conscious sedation.  Pt initially placed on 3L NC with etcO2 monitor.  Pt desated to low 80s and O2 increased 7L oxymask.  Jaw thrust applied.  Pt tolerated well the procedure.  Rt to follow

## 2023-08-10 NOTE — PROGRESS NOTES
[facility]    Medicine Progress Note - Hospitalist Service    Date of Admission:  8/10/2023    Assessment & Plan   Everett Dixon is a 63 year old female admitted on 8/10/2023. She presented to the ER after a mechanical fall accident.  Right ankle x-ray with displaced bimalleolar ankle fracture with lateral talar shift and angulation.     Mechanical fall  Right foot pain  Closed right ankle fracture  -Sustained a mechanical fall accident in her niece's house while getting out of bathroom she was tipped and fell.  Right ankle x-ray significant for displaced bimalleolar ankle fracture  -Had a remote history of left ankle fracture in 2003  -[facility] following, planned for intervention later today  -Continue NPO, pain control  -Pain control with Tylenol, oral oxycodone and IV Dilaudid  -N.p.o.     Active smoker  -Counseling, nicotine patch or gum    Elevated BP with no diagnosis of hypertension  -Manage pain adequately.  -Hydralazine prn ordered.    Mild MARVIN - monitor.          Diet: NPO for Medical/Clinical Reasons Except for: No Exceptions    DVT Prophylaxis: Pneumatic Compression Devices  [place] Catheter: Not present  Lines: None     Cardiac Monitoring: None  Code Status: Full Code      Clinically Significant Risk Factors Present on Admission           # Hypercalcemia: Highest Ca = 10.2 mg/dL in last 2 days, will monitor as appropriate                      Disposition Plan      Expected Discharge Date: 08/12/2023                  Halle Desai MD  Hospitalist Service  [facility]  Securely message with [employer] (more info)  Text page via [employer] Paging/Directory   ______________________________________________________________________    Interval History   Please refer to HP from today for more details.    Physical Exam   Vital Signs: Temp: 98.6  F (37  C) Temp src: Oral BP: (!) 148/86 Pulse: 76   Resp: 17 SpO2: 100 % O2 Device: None (Room air)    Weight: 130 lbs 0  oz          Medical Decision Making       15 mins spent in chart review, labs and documentation.     Data     I have personally reviewed the following data over the past 24 hrs:    11.3 (H)  \   10.9 (L)   / 305     139 103 27.5 (H) /  105 (H)   4.6 25 1.13 (H) \     ALT: 15 AST: 33 AP: 51 TBILI: 0.3   ALB: 3.8 TOT PROTEIN: 6.5 LIPASE: N/A     INR:  0.96 PTT:  N/A   D-dimer:  N/A Fibrinogen:  N/A

## 2023-08-10 NOTE — ED PROVIDER NOTES
EMERGENCY DEPARTMENT ENCOUnter      NAME: Everett Dixon  AGE: 63 year old female  YOB: 1960  MRN: [id number]  EVALUATION DATE & TIME: 8/10/2023 12:13 AM    PCP: No primary care provider on file.    ED PROVIDER: Sherri Barrientos MD      Chief Complaint   Patient presents with    Leg Injury     R         FINAL IMPRESSION:  1. Closed bimalleolar fracture of right ankle, initial encounter          ED COURSE & MEDICAL DECISION MAKING:      In summary, the patient is a 63-year-old female that presents to the emergency department for evaluation of right ankle pain thought secondary to a bimalleolar ankle fracture.  Will admit to the hospital for definitive care of her unstable ankle fracture.  0030-I met with the patient, obtained history, performed an initial exam, and discussed options and plan for diagnostics and treatment here in the ED. the prehospital splint that was placed was an adequate and causing significant tenting of her skin by the bone fragments.  A posterior leg with a stirrup was placed for stabilization of her unstable ankle fracture dislocation.  Fentanyl 50 mcg followed by 25 mcg IV was administered for pain.  0210-updated and rechecked patient.  0224-I spoke with Dr. Mc, Hospitalist. We further discussed the patient's case and reviewed ED work-up so far. She agrees to admit the patient.  0229-I spoke with Dr. Greene, [facility].  They will likely take the patient to the operating room later today.    Medical Decision Making    History:  Supplemental history from: Documented in chart, if applicable  External Record(s) reviewed: Documented in chart, if applicable.    Work Up:  Chart documentation includes differential considered and any EKGs or imaging independently interpreted by provider, where specified.  In additional to work up documented, I considered the following work up: Documented in chart, if applicable.    External consultation:  Discussion of management  with another provider: Orthopedics and Hospitalist    Complicating factors:  Care impacted by chronic illness: Other: Osteoarthritis  Care affected by social determinants of health: Access to Medical Care    Disposition considerations: Admit          At the conclusion of the encounter I discussed the results of all of the tests and the disposition. The questions were answered. The patient or family acknowledged understanding and was agreeable with the care plan.         MEDICATIONS GIVEN IN THE EMERGENCY:  Medications   lidocaine 1 % 0.1-1 mL (has no administration in time range)   lidocaine (LMX4) cream (has no administration in time range)   sodium chloride (PF) 0.9% PF flush 3 mL (3 mLs Intracatheter $Given 8/10/23 0306)   sodium chloride (PF) 0.9% PF flush 3 mL (has no administration in time range)   melatonin tablet 1 mg (has no administration in time range)   HOLD: ALL Anticoagulant medications until AFTER surgery (has no administration in time range)   ondansetron (ZOFRAN ODT) ODT tab 4 mg (has no administration in time range)     Or   ondansetron (ZOFRAN) injection 4 mg (has no administration in time range)   prochlorperazine (COMPAZINE) injection 10 mg (has no administration in time range)     Or   prochlorperazine (COMPAZINE) tablet 10 mg (has no administration in time range)     Or   prochlorperazine (COMPAZINE) suppository 25 mg (has no administration in time range)   polyethylene glycol (MIRALAX) Packet 17 g (has no administration in time range)   HYDROmorphone (DILAUDID) injection 0.2 mg (0.2 mg Intravenous $Given 8/10/23 0320)     Or   HYDROmorphone (DILAUDID) injection 0.4 mg ( Intravenous See Alternative 8/10/23 0320)   oxyCODONE (ROXICODONE) tablet 5 mg (5 mg Oral $Given 8/10/23 0320)     Or   oxyCODONE (ROXICODONE) tablet 10 mg ( Oral See Alternative 8/10/23 0320)   acetaminophen (TYLENOL) tablet 975 mg (has no administration in time range)   acetaminophen (TYLENOL) tablet 650 mg (has no  administration in time range)   naloxone (NARCAN) injection 0.2 mg (has no administration in time range)     Or   naloxone (NARCAN) injection 0.4 mg (has no administration in time range)     Or   naloxone (NARCAN) injection 0.2 mg (has no administration in time range)     Or   naloxone (NARCAN) injection 0.4 mg (has no administration in time range)   sodium chloride 0.9% infusion (has no administration in time range)   fentaNYL (PF) (SUBLIMAZE) injection 50 mcg (50 mcg Intravenous $Given 8/10/23 0042)   fentaNYL (PF) (SUBLIMAZE) injection 25 mcg (25 mcg Intravenous $Given 8/10/23 0049)   ondansetron (ZOFRAN) injection 4 mg (4 mg Intravenous Not Given 8/10/23 0245)       NEW PRESCRIPTIONS STARTED AT TODAY'S ER VISIT  New Prescriptions    No medications on file          =================================================================    HPI        Everett Dixon is a 63 year old female with no pertinent history who presents to this ED via ambulance for evaluation of leg injury    The patient was coming out of the bathroom and tripped on the door threshold [facility] and landed on the carpet. She suffered a right ankle and knee injury and associates a 10/10 pain to the areas. She also reports having hip pain and back pain. She reports being diagnosed with osteoarthritis. She denied any head trauma from the fall. She is allergic to penicillin. She does smoke cigarettes. She does not drink alcohol.     Otherwise, the patient denied having neck pain, no injuries elsewhere, and any other medical complaints or concerns at this time.    Her nephew was concerned that she may have been using drugs at the time of injury.    REVIEW OF SYSTEMS     Constitutional:  Denies fever or chills  HENT:  Denies sore throat   Respiratory:  Denies cough or shortness of breath   Cardiovascular:  Denies chest pain or palpitations  GI:  Denies abdominal pain, nausea, or vomiting  Musculoskeletal:  Positive for right ankle pain, right knee pain, back  pain, and hip pain.  Skin:  Denies rash   Neurologic:  Denies headache, focal weakness or sensory changes    All other systems reviewed and are negative      PAST MEDICAL HISTORY:  History reviewed. No pertinent past medical history.    PAST SURGICAL HISTORY:  History reviewed. No pertinent surgical history.        CURRENT MEDICATIONS:    baclofen (LIORESAL) 10 MG tablet  busPIRone (BUSPAR) 15 MG tablet  cholecalciferol (VITAMIN D3) 25 mcg (1000 units) capsule  gabapentin (NEURONTIN) 300 MG capsule        ALLERGIES:  Allergies   Allergen Reactions    Penicillins Rash       FAMILY HISTORY:  No family history on file.    SOCIAL HISTORY:   Social History     Socioeconomic History    Marital status: Single     Spouse name: None    Number of children: None    Years of education: None    Highest education level: None       VITALS:  Patient Vitals for the past 24 hrs:   BP Temp Pulse Resp SpO2 Weight   08/10/23 0400 (!) 152/80 -- 59 -- 97 % --   08/10/23 0330 (!) 147/89 -- 69 18 100 % --   08/10/23 0230 110/68 -- 63 12 94 % --   08/10/23 0100 128/72 -- 69 16 91 % --   08/10/23 0024 (!) 144/96 97.8  F (36.6  C) 72 26 96 % 60.8 kg (134 lb)       PHYSICAL EXAM    Constitutional:  Well developed, Well nourished,  HENT:  Normocephalic, Atraumatic, Bilateral external ears normal, Oropharynx moist, Nose normal.   Neck:  Normal range of motion, No meningismus, No stridor.   Eyes:  EOMI, Conjunctiva normal, No discharge.   Respiratory:  Normal breath sounds, No respiratory distress, No wheezing, No chest tenderness.   Cardiovascular:  Normal heart rate, Normal rhythm, No murmurs  GI:  Soft, No tenderness, No guarding, No CVA tenderness.   Musculoskeletal:  Neurovascularly intact distally, No edema, No tenderness, No cyanosis, Good range of motion in all major joints. No tenderness to palpation or major deformities noted.   Integument:  Warm, Dry, No erythema, No rash.   Lymphatic:  No lymphadenopathy noted.   Neurologic:  Alert &  oriented x 3, Normal motor function, Normal sensory function, No focal deficits noted.   Psychiatric:  Affect normal, Judgment normal, Mood normal.      LAB:  All pertinent labs reviewed and interpreted.  Results for orders placed or performed during the hospital encounter of 08/10/23   XR Tibia and Fibula Right 2 Views    Impression    IMPRESSION: The bones are well-mineralized. The bony pelvis is intact. The femoral head and neck are intact. The hip joint is well aligned. The femur is intact. The included portions of the knee joint are well aligned. Overlying splinting material limits   evaluation of the ankle, there is a displaced bimalleolar ankle fracture with lateral talar shift and angulation. Soft tissue swelling surrounds the ankle. The remainder of the tibia and fibula are intact.   Ankle XR, G/E 3 views, right    Impression    IMPRESSION: The bones are well-mineralized. The bony pelvis is intact. The femoral head and neck are intact. The hip joint is well aligned. The femur is intact. The included portions of the knee joint are well aligned. Overlying splinting material limits   evaluation of the ankle, there is a displaced bimalleolar ankle fracture with lateral talar shift and angulation. Soft tissue swelling surrounds the ankle. The remainder of the tibia and fibula are intact.   XR Pelvis and Hip Right 2 Views    Impression    IMPRESSION: The bones are well-mineralized. The bony pelvis is intact. The femoral head and neck are intact. The hip joint is well aligned. The femur is intact. The included portions of the knee joint are well aligned. Overlying splinting material limits   evaluation of the ankle, there is a displaced bimalleolar ankle fracture with lateral talar shift and angulation. Soft tissue swelling surrounds the ankle. The remainder of the tibia and fibula are intact.   XR Femur Right 2 Views    Impression    IMPRESSION: The bones are well-mineralized. The bony pelvis is intact. The  femoral head and neck are intact. The hip joint is well aligned. The femur is intact. The included portions of the knee joint are well aligned. Overlying splinting material limits   evaluation of the ankle, there is a displaced bimalleolar ankle fracture with lateral talar shift and angulation. Soft tissue swelling surrounds the ankle. The remainder of the tibia and fibula are intact.   XR Chest 1 View    Impression    IMPRESSION: Right paratracheal density which could represent vascular structure, however adenopathy not excluded. Mild low lung volumes accentuate heart size and pulmonary vascularity. Heart size and pulmonary vascularity within normal limits. No focal   lung infiltrates. No pneumothorax seen. Osseous structures grossly intact. Visualized upper abdomen unremarkable.   Result Value Ref Range    INR 1.00 0.85 - 1.15   Comprehensive metabolic panel   Result Value Ref Range    Sodium 136 136 - 145 mmol/L    Potassium 3.7 3.4 - 5.3 mmol/L    Chloride 102 98 - 107 mmol/L    Carbon Dioxide (CO2) 21 (L) 22 - 29 mmol/L    Anion Gap 13 7 - 15 mmol/L    Urea Nitrogen 27.7 (H) 8.0 - 23.0 mg/dL    Creatinine 1.09 (H) 0.51 - 0.95 mg/dL    Calcium 9.5 8.8 - 10.2 mg/dL    Glucose 104 (H) 70 - 99 mg/dL    Alkaline Phosphatase 51 35 - 104 U/L    AST 33 0 - 45 U/L    ALT 15 0 - 50 U/L    Protein Total 6.5 6.4 - 8.3 g/dL    Albumin 3.8 3.5 - 5.2 g/dL    Bilirubin Total 0.3 <=1.2 mg/dL    GFR Estimate 58 (L) >60 mL/min/1.73m2   CBC with platelets and differential   Result Value Ref Range    WBC Count 9.6 4.0 - 11.0 10e3/uL    RBC Count 3.25 (L) 3.80 - 5.20 10e6/uL    Hemoglobin 10.1 (L) 11.7 - 15.7 g/dL    Hematocrit 31.3 (L) 35.0 - 47.0 %    MCV 96 78 - 100 fL    MCH 31.1 26.5 - 33.0 pg    MCHC 32.3 31.5 - 36.5 g/dL    RDW 14.2 10.0 - 15.0 %    Platelet Count 282 150 - 450 10e3/uL    % Neutrophils 79 %    % Lymphocytes 14 %    % Monocytes 6 %    % Eosinophils 0 %    % Basophils 1 %    % Immature Granulocytes 0 %    NRBCs  per 100 WBC 0 <1 /100    Absolute Neutrophils 7.5 1.6 - 8.3 10e3/uL    Absolute Lymphocytes 1.4 0.8 - 5.3 10e3/uL    Absolute Monocytes 0.6 0.0 - 1.3 10e3/uL    Absolute Eosinophils 0.0 0.0 - 0.7 10e3/uL    Absolute Basophils 0.1 0.0 - 0.2 10e3/uL    Absolute Immature Granulocytes 0.0 <=0.4 10e3/uL    Absolute NRBCs 0.0 10e3/uL   Alcohol level blood   Result Value Ref Range    Alcohol ethyl <0.01 <=0.01 g/dL   Basic metabolic panel   Result Value Ref Range    Sodium 139 136 - 145 mmol/L    Potassium 4.6 3.4 - 5.3 mmol/L    Chloride 103 98 - 107 mmol/L    Carbon Dioxide (CO2) 25 22 - 29 mmol/L    Anion Gap 11 7 - 15 mmol/L    Urea Nitrogen 27.5 (H) 8.0 - 23.0 mg/dL    Creatinine 1.13 (H) 0.51 - 0.95 mg/dL    Calcium 10.2 8.8 - 10.2 mg/dL    Glucose 105 (H) 70 - 99 mg/dL    GFR Estimate 55 (L) >60 mL/min/1.73m2   CBC with platelets   Result Value Ref Range    WBC Count 11.3 (H) 4.0 - 11.0 10e3/uL    RBC Count 3.49 (L) 3.80 - 5.20 10e6/uL    Hemoglobin 10.9 (L) 11.7 - 15.7 g/dL    Hematocrit 33.7 (L) 35.0 - 47.0 %    MCV 97 78 - 100 fL    MCH 31.2 26.5 - 33.0 pg    MCHC 32.3 31.5 - 36.5 g/dL    RDW 14.2 10.0 - 15.0 %    Platelet Count 305 150 - 450 10e3/uL   Result Value Ref Range    INR 0.96 0.85 - 1.15   Adult Type and Screen   Result Value Ref Range    ABO/RH(D) A POS     Antibody Screen Negative Negative    SPECIMEN EXPIRATION DATE [id number]        RADIOLOGY:  I have independently reviewed and interpreted the above imaging, pending the final radiology read.  XR Chest 1 View   Final Result   IMPRESSION: Right paratracheal density which could represent vascular structure, however adenopathy not excluded. Mild low lung volumes accentuate heart size and pulmonary vascularity. Heart size and pulmonary vascularity within normal limits. No focal    lung infiltrates. No pneumothorax seen. Osseous structures grossly intact. Visualized upper abdomen unremarkable.      Ankle XR, G/E 3 views, right   Final Result    IMPRESSION: The bones are well-mineralized. The bony pelvis is intact. The femoral head and neck are intact. The hip joint is well aligned. The femur is intact. The included portions of the knee joint are well aligned. Overlying splinting material limits    evaluation of the ankle, there is a displaced bimalleolar ankle fracture with lateral talar shift and angulation. Soft tissue swelling surrounds the ankle. The remainder of the tibia and fibula are intact.      XR Tibia and Fibula Right 2 Views   Final Result   IMPRESSION: The bones are well-mineralized. The bony pelvis is intact. The femoral head and neck are intact. The hip joint is well aligned. The femur is intact. The included portions of the knee joint are well aligned. Overlying splinting material limits    evaluation of the ankle, there is a displaced bimalleolar ankle fracture with lateral talar shift and angulation. Soft tissue swelling surrounds the ankle. The remainder of the tibia and fibula are intact.      XR Pelvis and Hip Right 2 Views   Final Result   IMPRESSION: The bones are well-mineralized. The bony pelvis is intact. The femoral head and neck are intact. The hip joint is well aligned. The femur is intact. The included portions of the knee joint are well aligned. Overlying splinting material limits    evaluation of the ankle, there is a displaced bimalleolar ankle fracture with lateral talar shift and angulation. Soft tissue swelling surrounds the ankle. The remainder of the tibia and fibula are intact.      XR Femur Right 2 Views   Final Result   IMPRESSION: The bones are well-mineralized. The bony pelvis is intact. The femoral head and neck are intact. The hip joint is well aligned. The femur is intact. The included portions of the knee joint are well aligned. Overlying splinting material limits    evaluation of the ankle, there is a displaced bimalleolar ankle fracture with lateral talar shift and angulation. Soft tissue swelling  surrounds the ankle. The remainder of the tibia and fibula are intact.          EK-rate is 67, sinus, T wave inversions noted in V1-3    I have independently reviewed and interpreted this EKG    PROCEDURE:        PROCEDURE:  1. Orthopedic Reduction    2. Splint Application   INDICATIONS:  Right ankle fracture dislocation   PROCEDURE PROVIDER: Dr Sherri Barrientos   MEDICATIONS: fentanyl   PROCEDURE NOTE: ORTHOPEDIC MANAGEMENT:  Bone/Joint Manipulation: Affected extremity was grasped and gradual traction was applied in-line with fracture. The site was further manipulated manually until alignment was improved.      Alignment improved post procedure.     SPLINTING/IMMOBILIZATION:   A Posterior slab (short leg) with Stirrup splint made of Orthoglass was applied.  After placement I checked and adjusted the fit to ensure proper positioning. Patient was more comfortable with the splint in place.  Sensation and circulation are intact after splint placement.   COMPLICATIONS: Patient tolerated procedure well, without complication         I, Seth Robles, am serving as a scribe to document services personally performed by Dr. Barrientos based on my observation and the provider's statements to me. I, Sherri Barrientos MD attest that Seth Robles is acting in a scribe capacity, has observed my performance of the services and has documented them in accordance with my direction.    Sherri Barrientos MD  Emergency Medicine  [facility] EMERGENCY DEPARTMENT  [facility] [facility] [address]  [phone]  Dept: [phone]       Sherri Barrientos MD  08/10/23 0417

## 2023-08-10 NOTE — PHARMACY-ADMISSION MEDICATION HISTORY
Pharmacist Admission Medication History    Admission medication history is complete. The information provided in this note is only as accurate as the sources available at the time of the update.    Medication reconciliation/reorder completed by provider prior to medication history? Yes    Information Source(s): Patient and CareEverywhere/SureScripts via in-person    Pertinent Information: Updating PTA med list. In previous note, duloxetine was deleted from PTA med list, patient states she is still taking duloxetine, added it back to list. Patient prescribed gabapentin 300 mg caps: 3 caps TID but has been taking 2 caps TID because she feels like the 3 caps (=900 mg) at a time is too much. Patient states she uses both her rescue inhaler and advair inhaler only as needed. Patient took ibuprofen yesterday for toothache, patient also taking celebrex, educated patient on duplicate therapy and risk of bleeding. Patient also states she has been taking zolpidem 10 mg nightly as needed for a while.     Changes made to PTA medication list:  Added: albuterol, advair, atorvastatin, betamethasone ointment, celebrex, duloxetine, famotidine, loratidine, pramipexole, zolpidem, magnesium, ibuprofen, tylenol  Deleted: None  Changed: gabapentin 300 mg cap: 1 cap TID --> 2 caps TID, buspirone 15 mg TID --> 15 mg BID.     Medication Affordability:  Not including over the counter (OTC) medications, was there a time in the past 3 months when you did not take your medications as prescribed because of cost?: No    Allergies reviewed with patient and updates made in EHR: yes    Medication History Completed By: CARLOS DUARTE RPH 8/10/2023 4:47 PM    Prior to Admission medications    Medication Sig Last Dose Taking? Auth Provider Long Term End Date   acetaminophen (TYLENOL) 500 MG tablet Take 2 tablets by mouth 2 times daily as needed for mild pain 8/9/2023 at AM Yes Unknown, Entered By History     albuterol (PROAIR HFA/PROVENTIL HFA/VENTOLIN  HFA) 108 (90 Base) MCG/ACT inhaler Inhale 1 puff into the lungs every 6 hours as needed for shortness of breath or cough 8/9/2023 at PM Yes Unknown, Entered By History Yes    atorvastatin (LIPITOR) 20 MG tablet Take 1 tablet by mouth daily 8/9/2023 at AM Yes Unknown, Entered By History Yes    baclofen (LIORESAL) 10 MG tablet Take 1 tablet by mouth 3 times daily 8/9/2023 at PM Yes Reported, Patient     betamethasone dipropionate (DIPROSONE) 0.05 % external ointment Apply topically 2 times daily 8/9/2023 at PM Yes Unknown, Entered By History     busPIRone (BUSPAR) 15 MG tablet Take 1 tablet by mouth 2 times daily 8/9/2023 at PM Yes Reported, Patient Yes    celecoxib (CELEBREX) 200 MG capsule Take 1 capsule by mouth daily 8/9/2023 at AM Yes Unknown, Entered By History Yes    cholecalciferol (VITAMIN D3) 25 mcg (1000 units) capsule Take 1 capsule by mouth daily 8/9/2023 at AM Yes Reported, Patient     DULoxetine (CYMBALTA) 60 MG capsule Take 1 capsule by mouth 2 times daily 8/9/2023 at PM Yes Unknown, Entered By History No    famotidine (PEPCID) 20 MG tablet Take 1 tablet by mouth At Bedtime 8/9/2023 at HS Yes Unknown, Entered By History     fluticasone-salmeterol (ADVAIR) 250-50 MCG/ACT inhaler Inhale 1 puff into the lungs daily as needed (Shortness of Breath) 8/9/2023 at AM Yes Unknown, Entered By History Yes    gabapentin (NEURONTIN) 300 MG capsule Take 2 capsules by mouth 3 times daily 8/9/2023 at pm Yes Reported, Patient Yes    ibuprofen (ADVIL/MOTRIN) 200 MG tablet Take 3 tablets by mouth daily as needed for pain 8/9/2023 at AM Yes Unknown, Entered By History     loratadine (CLARITIN) 10 MG tablet Take 1 tablet by mouth daily 8/9/2023 at AM Yes Unknown, Entered By History     magnesium oxide 200 MG TABS Take 1 tablet by mouth daily 8/9/2023 at AM Yes Unknown, Entered By History     pramipexole (MIRAPEX) 0.125 MG tablet Take 1 tablet by mouth 2 times daily as needed (Restless Legs) 8/9/2023 at PM Yes Unknown,  Entered By History Yes    zolpidem (AMBIEN) 10 MG tablet Take 1 tablet by mouth nightly as needed for sleep 8/8/2023 at HS Yes Unknown, Entered By History

## 2023-08-10 NOTE — ED TRIAGE NOTES
Pt BIBA from home for right ankle/tib fib pain. Pt exiting bathroom tile floor and tripped on door threshold, landing on carpet. Obvious deformity to right lower leg and ankle, swelling present. Sensation and pulses intact. Pt received 100 mcg IN fentanyl en route with no relief.     Triage Assessment       Row Name 08/10/23 0021       Triage Assessment (Adult)    Airway WDL WDL       Respiratory WDL    Respiratory WDL WDL       Skin Circulation/Temperature WDL    Skin Circulation/Temperature WDL WDL       Cardiac WDL    Cardiac WDL WDL       Peripheral/Neurovascular WDL    Peripheral Neurovascular WDL WDL;neurovascular assessment lower       RLE Neurovascular Assessment    Sensation RLE tenderness present       Cognitive/Neuro/Behavioral WDL    Cognitive/Neuro/Behavioral WDL WDL

## 2023-08-11 LAB
ANION GAP SERPL CALCULATED.3IONS-SCNC: 12 MMOL/L (ref 7–15)
ATRIAL RATE - MUSE: 67 BPM
BUN SERPL-MCNC: 9.1 MG/DL (ref 8–23)
CALCIUM SERPL-MCNC: 9.6 MG/DL (ref 8.8–10.2)
CHLORIDE SERPL-SCNC: 105 MMOL/L (ref 98–107)
CREAT SERPL-MCNC: 0.62 MG/DL (ref 0.51–0.95)
DEPRECATED HCO3 PLAS-SCNC: 21 MMOL/L (ref 22–29)
DIASTOLIC BLOOD PRESSURE - MUSE: NORMAL MMHG
GFR SERPL CREATININE-BSD FRML MDRD: >90 ML/MIN/1.73M2
GLUCOSE SERPL-MCNC: 88 MG/DL (ref 70–99)
INTERPRETATION ECG - MUSE: NORMAL
MAGNESIUM SERPL-MCNC: 1.9 MG/DL (ref 1.7–2.3)
P AXIS - MUSE: 71 DEGREES
POTASSIUM SERPL-SCNC: 3.2 MMOL/L (ref 3.4–5.3)
POTASSIUM SERPL-SCNC: 3.8 MMOL/L (ref 3.4–5.3)
PR INTERVAL - MUSE: 156 MS
QRS DURATION - MUSE: 88 MS
QT - MUSE: 446 MS
QTC - MUSE: 471 MS
R AXIS - MUSE: 39 DEGREES
SODIUM SERPL-SCNC: 138 MMOL/L (ref 136–145)
SYSTOLIC BLOOD PRESSURE - MUSE: NORMAL MMHG
T AXIS - MUSE: 90 DEGREES
VENTRICULAR RATE- MUSE: 67 BPM

## 2023-08-11 PROCEDURE — 999N000202 HC STATISTICAL VASC ACCESS NURSE TIME, 1-15 MINUTES

## 2023-08-11 PROCEDURE — 250N000011 HC RX IP 250 OP 636: Mod: JZ | Performed by: INTERNAL MEDICINE

## 2023-08-11 PROCEDURE — 120N000001 HC R&B MED SURG/OB

## 2023-08-11 PROCEDURE — 80048 BASIC METABOLIC PNL TOTAL CA: CPT | Performed by: HOSPITALIST

## 2023-08-11 PROCEDURE — 999N000127 HC STATISTIC PERIPHERAL IV START W US GUIDANCE

## 2023-08-11 PROCEDURE — 250N000013 HC RX MED GY IP 250 OP 250 PS 637: Performed by: HOSPITALIST

## 2023-08-11 PROCEDURE — 250N000011 HC RX IP 250 OP 636: Mod: JZ | Performed by: HOSPITALIST

## 2023-08-11 PROCEDURE — 250N000011 HC RX IP 250 OP 636: Mod: JZ | Performed by: STUDENT IN AN ORGANIZED HEALTH CARE EDUCATION/TRAINING PROGRAM

## 2023-08-11 PROCEDURE — 99232 SBSQ HOSP IP/OBS MODERATE 35: CPT | Performed by: HOSPITALIST

## 2023-08-11 PROCEDURE — 83735 ASSAY OF MAGNESIUM: CPT | Performed by: HOSPITALIST

## 2023-08-11 PROCEDURE — 36415 COLL VENOUS BLD VENIPUNCTURE: CPT | Performed by: HOSPITALIST

## 2023-08-11 PROCEDURE — 250N000013 HC RX MED GY IP 250 OP 250 PS 637: Performed by: STUDENT IN AN ORGANIZED HEALTH CARE EDUCATION/TRAINING PROGRAM

## 2023-08-11 PROCEDURE — 258N000003 HC RX IP 258 OP 636: Performed by: STUDENT IN AN ORGANIZED HEALTH CARE EDUCATION/TRAINING PROGRAM

## 2023-08-11 PROCEDURE — 84132 ASSAY OF SERUM POTASSIUM: CPT | Performed by: HOSPITALIST

## 2023-08-11 PROCEDURE — 250N000011 HC RX IP 250 OP 636: Mod: JZ

## 2023-08-11 PROCEDURE — 999N000033 HC STATISTIC CHRONIC PULMONARY DISEASE SPECIALIST

## 2023-08-11 PROCEDURE — 250N000013 HC RX MED GY IP 250 OP 250 PS 637

## 2023-08-11 PROCEDURE — 99406 BEHAV CHNG SMOKING 3-10 MIN: CPT

## 2023-08-11 PROCEDURE — 999N000032 HC STATISTIC CHRONIC DISEASE SPECIALIST RT CONSULT

## 2023-08-11 RX ORDER — CYCLOBENZAPRINE HCL 5 MG
5 TABLET ORAL ONCE
Status: COMPLETED | OUTPATIENT
Start: 2023-08-11 | End: 2023-08-11

## 2023-08-11 RX ORDER — OXYCODONE HYDROCHLORIDE 5 MG/1
5-10 TABLET ORAL EVERY 6 HOURS
Status: DISCONTINUED | OUTPATIENT
Start: 2023-08-11 | End: 2023-08-12

## 2023-08-11 RX ORDER — OXYCODONE HYDROCHLORIDE 5 MG/1
15 TABLET ORAL ONCE
Status: COMPLETED | OUTPATIENT
Start: 2023-08-11 | End: 2023-08-11

## 2023-08-11 RX ORDER — HYDROMORPHONE HCL IN WATER/PF 6 MG/30 ML
0.2 PATIENT CONTROLLED ANALGESIA SYRINGE INTRAVENOUS 4 TIMES DAILY PRN
Status: DISCONTINUED | OUTPATIENT
Start: 2023-08-11 | End: 2023-08-11

## 2023-08-11 RX ORDER — POTASSIUM CHLORIDE 1500 MG/1
40 TABLET, EXTENDED RELEASE ORAL ONCE
Status: COMPLETED | OUTPATIENT
Start: 2023-08-11 | End: 2023-08-11

## 2023-08-11 RX ORDER — HYDROMORPHONE HCL IN WATER/PF 6 MG/30 ML
0.4 PATIENT CONTROLLED ANALGESIA SYRINGE INTRAVENOUS EVERY 4 HOURS PRN
Status: DISCONTINUED | OUTPATIENT
Start: 2023-08-11 | End: 2023-08-12

## 2023-08-11 RX ORDER — HYDROMORPHONE HCL IN WATER/PF 6 MG/30 ML
0.2 PATIENT CONTROLLED ANALGESIA SYRINGE INTRAVENOUS EVERY 4 HOURS PRN
Status: DISCONTINUED | OUTPATIENT
Start: 2023-08-11 | End: 2023-08-12

## 2023-08-11 RX ORDER — HYDROMORPHONE HCL IN WATER/PF 6 MG/30 ML
0.2 PATIENT CONTROLLED ANALGESIA SYRINGE INTRAVENOUS ONCE
Status: COMPLETED | OUTPATIENT
Start: 2023-08-11 | End: 2023-08-11

## 2023-08-11 RX ORDER — OXYCODONE HYDROCHLORIDE 5 MG/1
10 TABLET ORAL EVERY 4 HOURS PRN
Status: DISCONTINUED | OUTPATIENT
Start: 2023-08-11 | End: 2023-08-12

## 2023-08-11 RX ORDER — PRAMIPEXOLE DIHYDROCHLORIDE 0.12 MG/1
0.12 TABLET ORAL 2 TIMES DAILY PRN
Status: DISCONTINUED | OUTPATIENT
Start: 2023-08-11 | End: 2023-08-18 | Stop reason: HOSPADM

## 2023-08-11 RX ORDER — POLYETHYLENE GLYCOL 3350 17 G/17G
17 POWDER, FOR SOLUTION ORAL DAILY PRN
Status: DISCONTINUED | OUTPATIENT
Start: 2023-08-11 | End: 2023-08-11

## 2023-08-11 RX ORDER — HYDROMORPHONE HCL IN WATER/PF 6 MG/30 ML
0.4 PATIENT CONTROLLED ANALGESIA SYRINGE INTRAVENOUS 4 TIMES DAILY PRN
Status: DISCONTINUED | OUTPATIENT
Start: 2023-08-11 | End: 2023-08-11

## 2023-08-11 RX ORDER — ALBUTEROL SULFATE 90 UG/1
1 AEROSOL, METERED RESPIRATORY (INHALATION) EVERY 6 HOURS PRN
Status: DISCONTINUED | OUTPATIENT
Start: 2023-08-11 | End: 2023-08-18 | Stop reason: HOSPADM

## 2023-08-11 RX ORDER — ZOLPIDEM TARTRATE 5 MG/1
10 TABLET ORAL
Status: DISCONTINUED | OUTPATIENT
Start: 2023-08-11 | End: 2023-08-18 | Stop reason: HOSPADM

## 2023-08-11 RX ORDER — BISACODYL 10 MG
10 SUPPOSITORY, RECTAL RECTAL DAILY PRN
Status: DISCONTINUED | OUTPATIENT
Start: 2023-08-11 | End: 2023-08-16

## 2023-08-11 RX ORDER — AMOXICILLIN 250 MG
1 CAPSULE ORAL 2 TIMES DAILY
Status: DISCONTINUED | OUTPATIENT
Start: 2023-08-11 | End: 2023-08-15

## 2023-08-11 RX ORDER — FLUTICASONE FUROATE AND VILANTEROL 100; 25 UG/1; UG/1
1 POWDER RESPIRATORY (INHALATION) DAILY
Status: DISCONTINUED | OUTPATIENT
Start: 2023-08-11 | End: 2023-08-18 | Stop reason: HOSPADM

## 2023-08-11 RX ORDER — OXYCODONE HYDROCHLORIDE 5 MG/1
5 TABLET ORAL EVERY 4 HOURS PRN
Status: DISCONTINUED | OUTPATIENT
Start: 2023-08-11 | End: 2023-08-12

## 2023-08-11 RX ORDER — PNV NO.95/FERROUS FUM/FOLIC AC 28MG-0.8MG
250 TABLET ORAL DAILY
Status: DISCONTINUED | OUTPATIENT
Start: 2023-08-11 | End: 2023-08-18 | Stop reason: HOSPADM

## 2023-08-11 RX ORDER — CELECOXIB 200 MG/1
200 CAPSULE ORAL 2 TIMES DAILY
Status: DISCONTINUED | OUTPATIENT
Start: 2023-08-11 | End: 2023-08-13

## 2023-08-11 RX ORDER — POTASSIUM CHLORIDE 7.45 MG/ML
10 INJECTION INTRAVENOUS
Status: DISCONTINUED | OUTPATIENT
Start: 2023-08-11 | End: 2023-08-11

## 2023-08-11 RX ORDER — BETAMETHASONE DIPROPIONATE 0.5 MG/G
LOTION TOPICAL 2 TIMES DAILY
Status: DISCONTINUED | OUTPATIENT
Start: 2023-08-11 | End: 2023-08-18 | Stop reason: HOSPADM

## 2023-08-11 RX ORDER — ACETAMINOPHEN 325 MG/1
975 TABLET ORAL EVERY 6 HOURS
Status: DISCONTINUED | OUTPATIENT
Start: 2023-08-11 | End: 2023-08-18 | Stop reason: HOSPADM

## 2023-08-11 RX ORDER — ATORVASTATIN CALCIUM 10 MG/1
20 TABLET, FILM COATED ORAL DAILY
Status: DISCONTINUED | OUTPATIENT
Start: 2023-08-11 | End: 2023-08-18 | Stop reason: HOSPADM

## 2023-08-11 RX ORDER — DULOXETIN HYDROCHLORIDE 60 MG/1
60 CAPSULE, DELAYED RELEASE ORAL 2 TIMES DAILY
Status: DISCONTINUED | OUTPATIENT
Start: 2023-08-11 | End: 2023-08-18 | Stop reason: HOSPADM

## 2023-08-11 RX ORDER — CALCIUM CARBONATE 500 MG/1
500 TABLET, CHEWABLE ORAL DAILY PRN
Status: DISCONTINUED | OUTPATIENT
Start: 2023-08-11 | End: 2023-08-18 | Stop reason: HOSPADM

## 2023-08-11 RX ADMIN — HYDROMORPHONE HYDROCHLORIDE 0.2 MG: 0.2 INJECTION, SOLUTION INTRAMUSCULAR; INTRAVENOUS; SUBCUTANEOUS at 00:40

## 2023-08-11 RX ADMIN — BETAMETHASONE DIPROPIONATE: 0.5 LOTION TOPICAL at 20:09

## 2023-08-11 RX ADMIN — SODIUM CHLORIDE: 9 INJECTION, SOLUTION INTRAVENOUS at 01:57

## 2023-08-11 RX ADMIN — OXYCODONE HYDROCHLORIDE 10 MG: 5 TABLET ORAL at 21:53

## 2023-08-11 RX ADMIN — ACETAMINOPHEN 975 MG: 325 TABLET ORAL at 03:17

## 2023-08-11 RX ADMIN — HYDROMORPHONE HYDROCHLORIDE 0.4 MG: 0.2 INJECTION, SOLUTION INTRAMUSCULAR; INTRAVENOUS; SUBCUTANEOUS at 14:21

## 2023-08-11 RX ADMIN — HYDROMORPHONE HYDROCHLORIDE 0.4 MG: 0.2 INJECTION, SOLUTION INTRAMUSCULAR; INTRAVENOUS; SUBCUTANEOUS at 06:51

## 2023-08-11 RX ADMIN — OXYCODONE HYDROCHLORIDE 10 MG: 5 TABLET ORAL at 01:13

## 2023-08-11 RX ADMIN — BUSPIRONE HYDROCHLORIDE 15 MG: 15 TABLET ORAL at 10:22

## 2023-08-11 RX ADMIN — CALCIUM CARBONATE (ANTACID) CHEW TAB 500 MG 500 MG: 500 CHEW TAB at 05:39

## 2023-08-11 RX ADMIN — BETAMETHASONE DIPROPIONATE: 0.5 LOTION TOPICAL at 13:09

## 2023-08-11 RX ADMIN — BACLOFEN 10 MG: 10 TABLET ORAL at 10:21

## 2023-08-11 RX ADMIN — OXYCODONE HYDROCHLORIDE 15 MG: 5 TABLET ORAL at 09:59

## 2023-08-11 RX ADMIN — FLUTICASONE FUROATE AND VILANTEROL TRIFENATATE 1 PUFF: 100; 25 POWDER RESPIRATORY (INHALATION) at 12:11

## 2023-08-11 RX ADMIN — CYCLOBENZAPRINE HYDROCHLORIDE 5 MG: 5 TABLET, FILM COATED ORAL at 18:08

## 2023-08-11 RX ADMIN — BACLOFEN 10 MG: 10 TABLET ORAL at 20:09

## 2023-08-11 RX ADMIN — DULOXETINE HYDROCHLORIDE 60 MG: 60 CAPSULE, DELAYED RELEASE PELLETS ORAL at 20:08

## 2023-08-11 RX ADMIN — OXYCODONE HYDROCHLORIDE 10 MG: 5 TABLET ORAL at 16:52

## 2023-08-11 RX ADMIN — OXYCODONE HYDROCHLORIDE 10 MG: 5 TABLET ORAL at 20:06

## 2023-08-11 RX ADMIN — HYDROMORPHONE HYDROCHLORIDE 0.4 MG: 0.2 INJECTION, SOLUTION INTRAMUSCULAR; INTRAVENOUS; SUBCUTANEOUS at 22:58

## 2023-08-11 RX ADMIN — CYCLOBENZAPRINE HYDROCHLORIDE 5 MG: 5 TABLET, FILM COATED ORAL at 04:42

## 2023-08-11 RX ADMIN — Medication 25 MCG: at 10:02

## 2023-08-11 RX ADMIN — GABAPENTIN 600 MG: 300 CAPSULE ORAL at 13:09

## 2023-08-11 RX ADMIN — HYDROMORPHONE HYDROCHLORIDE 0.4 MG: 0.2 INJECTION, SOLUTION INTRAMUSCULAR; INTRAVENOUS; SUBCUTANEOUS at 04:30

## 2023-08-11 RX ADMIN — HYDROMORPHONE HYDROCHLORIDE 0.4 MG: 0.2 INJECTION, SOLUTION INTRAMUSCULAR; INTRAVENOUS; SUBCUTANEOUS at 18:21

## 2023-08-11 RX ADMIN — ACETAMINOPHEN 975 MG: 325 TABLET ORAL at 18:08

## 2023-08-11 RX ADMIN — HYDRALAZINE HYDROCHLORIDE 10 MG: 20 INJECTION, SOLUTION INTRAMUSCULAR; INTRAVENOUS at 04:27

## 2023-08-11 RX ADMIN — GABAPENTIN 600 MG: 300 CAPSULE ORAL at 20:08

## 2023-08-11 RX ADMIN — CELECOXIB 200 MG: 200 CAPSULE ORAL at 20:08

## 2023-08-11 RX ADMIN — Medication 250 MG: at 12:10

## 2023-08-11 RX ADMIN — HYDROMORPHONE HYDROCHLORIDE 0.4 MG: 0.2 INJECTION, SOLUTION INTRAMUSCULAR; INTRAVENOUS; SUBCUTANEOUS at 02:05

## 2023-08-11 RX ADMIN — DULOXETINE HYDROCHLORIDE 60 MG: 60 CAPSULE, DELAYED RELEASE PELLETS ORAL at 12:10

## 2023-08-11 RX ADMIN — ATORVASTATIN CALCIUM 20 MG: 10 TABLET, FILM COATED ORAL at 12:10

## 2023-08-11 RX ADMIN — HYDROMORPHONE HYDROCHLORIDE 0.4 MG: 0.2 INJECTION, SOLUTION INTRAMUSCULAR; INTRAVENOUS; SUBCUTANEOUS at 10:54

## 2023-08-11 RX ADMIN — PRAMIPEXOLE DIHYDROCHLORIDE 0.12 MG: 0.12 TABLET ORAL at 13:16

## 2023-08-11 RX ADMIN — ONDANSETRON 4 MG: 2 INJECTION INTRAMUSCULAR; INTRAVENOUS at 13:16

## 2023-08-11 RX ADMIN — GABAPENTIN 600 MG: 300 CAPSULE ORAL at 10:01

## 2023-08-11 RX ADMIN — POTASSIUM CHLORIDE 40 MEQ: 1500 TABLET, EXTENDED RELEASE ORAL at 10:01

## 2023-08-11 RX ADMIN — BUSPIRONE HYDROCHLORIDE 15 MG: 15 TABLET ORAL at 20:08

## 2023-08-11 RX ADMIN — ZOLPIDEM TARTRATE 10 MG: 5 TABLET ORAL at 22:23

## 2023-08-11 RX ADMIN — NICOTINE 1 PATCH: 14 PATCH, EXTENDED RELEASE TRANSDERMAL at 05:43

## 2023-08-11 RX ADMIN — NICOTINE 1 PATCH: 14 PATCH, EXTENDED RELEASE TRANSDERMAL at 22:30

## 2023-08-11 RX ADMIN — OXYCODONE HYDROCHLORIDE 10 MG: 5 TABLET ORAL at 13:09

## 2023-08-11 RX ADMIN — BACLOFEN 10 MG: 10 TABLET ORAL at 13:17

## 2023-08-11 RX ADMIN — ACETAMINOPHEN 975 MG: 325 TABLET ORAL at 12:10

## 2023-08-11 ASSESSMENT — ACTIVITIES OF DAILY LIVING (ADL)
ADLS_ACUITY_SCORE: 49
ADLS_ACUITY_SCORE: 49
ADLS_ACUITY_SCORE: 45
ADLS_ACUITY_SCORE: 51
ADLS_ACUITY_SCORE: 45
ADLS_ACUITY_SCORE: 41
ADLS_ACUITY_SCORE: 41
ADLS_ACUITY_SCORE: 49
ADLS_ACUITY_SCORE: 41
ADLS_ACUITY_SCORE: 45
ADLS_ACUITY_SCORE: 41
ADLS_ACUITY_SCORE: 41
DEPENDENT_IADLS:: INDEPENDENT

## 2023-08-11 NOTE — PROVIDER NOTIFICATION
"Pt called nurse in to report that pain was now a shooting pain down the length of her leg.  Offered pain medicine available but pt reluctant because \"it just doesn't seem to be helping.\"  Placed a call to house officer to inquire if there is anything else that can be done for pt.  She is in room teary, frustrated and states \"I just don't think I can take it anymore.\"    0620--Discussed with House officer and he will be up to see pt again.  "

## 2023-08-11 NOTE — SIGNIFICANT EVENT
Significant Event Note    Time of event: 6:38 AM August 11, 2023    Description of event:  Continued right lower extremity pain.    Everett is a 63-year-old female admitted with displaced bimalleolar ankle fracture.    Underwent reduction x 2 yesterday.  Placed in a splint.  Since her second reduction, has had significant pain in her right lower extremity.  Describes a dull achy sensation from her knee to her mid calf.  Describes intermittent sharp stabbing sensation in her ankle that radiates up her leg.    10 out of 10 in severity.  Pain has been pretty consistent since her second reduction.  She does not believe it has significantly worsened.  Has been receiving oxycodone and Dilaudid overnight without much improvement of her symptoms.  She is keeping her leg elevated.    On exam, does appear uncomfortable.  Right lower extremity in a splint.  Capillary refill less than 2 seconds at the toes.  Warm to the touch.  Sensations intact.  Able to wiggle toes.  No significant swelling or tenderness in the upper right leg.    Plan:  Will touch base with orthopedic surgery this morning.  With no significant change in her pain following reduction, until now, less concern for more acute process.    -Continue current pain regimen    Discussed with: Nursing staff      8:35 AM  Discussed with orthopedic surgery.  Recommended trying a dose of ketorolac for pain control.  Their team will be by today to see patient.      Tony Mcfadden MD  House

## 2023-08-11 NOTE — UTILIZATION REVIEW
Admission Status; Secondary Review Determination   Under the authority of the Utilization Management Committee, the utilization review process indicated a secondary review on Everett Dixon. The review outcome is based on review of the medical records, discussions with staff, and applying clinical experience noted on the date of the review.   (x) Inpatient Status Appropriate - This patient's medical care is consistent with medical management for inpatient care and reasonable inpatient medical practice.     RATIONALE FOR DETERMINATION   63-year-old female admitted with a right ankle bimalleolar fracture which was reduced in the emergency department.  Continues to have significant 10 out of 10 pain following the reduction.  Orthopedics following.  Pain team consulted.  Has received at least 7 doses of IV narcotic in the last 24 hours and 4 doses of oral narcotic.  This is in addition to scheduled Tylenol and baclofen and a dose of as needed Flexeril and Toradol.    At the time of admission with the information available to the attending physician more than 2 nights Hospital complex care was anticipated, based on patient risk of adverse outcome if treated as outpatient and complex care required. Inpatient admission is appropriate based on the Medicare guidelines.   The information on this document is developed by the utilization review team in order for the business office to ensure compliance. This only denotes the appropriateness of proper admission status and does not reflect the quality of care rendered.   The definitions of Inpatient Status and Observation Status used in making the determination above are those provided in the CMS Coverage Manual, Chapter 1 and Chapter 6, section 70.4.   Sincerely,   Yohan Becerra MD  Utilization Review  Physician Advisor  [facility]

## 2023-08-11 NOTE — PLAN OF CARE
"  Problem: Plan of Care - These are the overarching goals to be used throughout the patient stay.    Goal: Plan of Care Review  Description: The Plan of Care Review/Shift note should be completed every shift.  The Outcome Evaluation is a brief statement about your assessment that the patient is improving, declining, or no change.  This information will be displayed automatically on your shift note.  Outcome: Progressing  Goal: Patient-Specific Goal (Individualized)  Description: You can add care plan individualizations to a care plan. Examples of Individualization might be:  \"Parent requests to be called daily at 9am for status\", \"I have a hard time hearing out of my right ear\", or \"Do not touch me to wake me up as it startles me\".  Outcome: Progressing  Goal: Absence of Hospital-Acquired Illness or Injury  Outcome: Progressing  Intervention: Identify and Manage Fall Risk  Recent Flowsheet Documentation  Taken 8/11/2023 1600 by Meagan Gonzalez RN  Safety Promotion/Fall Prevention:   safety round/check completed   room organization consistent   room near nurse's station   toileting scheduled   lighting adjusted   clutter free environment maintained  Taken 8/11/2023 0900 by Meagan Gonzalez, RN  Safety Promotion/Fall Prevention:   safety round/check completed   room organization consistent   room near nurse's station   toileting scheduled   lighting adjusted   clutter free environment maintained  Intervention: Prevent Skin Injury  Recent Flowsheet Documentation  Taken 8/11/2023 1600 by Meagan Gonzalez RN  Body Position:   position changed independently   supine, legs elevated   supine, head elevated  Goal: Optimal Comfort and Wellbeing  Outcome: Progressing  Goal: Readiness for Transition of Care  Outcome: Progressing     Problem: Pain Acute  Goal: Optimal Pain Control and Function  Outcome: Met  Intervention: Prevent or Manage Pain  Recent Flowsheet Documentation  Taken 8/11/2023 1600 by Meagan Gonzalez, " RN  Medication Review/Management: medications reviewed  Taken 8/11/2023 0900 by Meagan Gonzalez RN  Medication Review/Management: medications reviewed   Goal Outcome Evaluation:  Pt had poor pain control this shift. All available prn meds admin'd. Pain team consulted. Pt crying much this shift. Wanted to leave this am but then more accepting of situaiton as shift progressed. Toes warm to touch, can wiggle and feel them. Shooting pain down leg. Awaiting new pain med orders, monitor for effectiveness.

## 2023-08-11 NOTE — PLAN OF CARE
"  Problem: Plan of Care - These are the overarching goals to be used throughout the patient stay.    Goal: Plan of Care Review  Description: The Plan of Care Review/Shift note should be completed every shift.  The Outcome Evaluation is a brief statement about your assessment that the patient is improving, declining, or no change.  This information will be displayed automatically on your shift note.  Outcome: Progressing  Goal: Patient-Specific Goal (Individualized)  Description: You can add care plan individualizations to a care plan. Examples of Individualization might be:  \"Parent requests to be called daily at 9am for status\", \"I have a hard time hearing out of my right ear\", or \"Do not touch me to wake me up as it startles me\".  Outcome: Progressing  Goal: Absence of Hospital-Acquired Illness or Injury  Outcome: Progressing  Intervention: Identify and Manage Fall Risk  Recent Flowsheet Documentation  Taken 8/10/2023 1700 by Guicho Wolfe RN  Safety Promotion/Fall Prevention: room near nurse's station  Goal: Optimal Comfort and Wellbeing  Outcome: Progressing  Goal: Readiness for Transition of Care  Outcome: Progressing   Goal Outcome Evaluation:         Pt is alert and oriented x4, able to communicate her needs. Pt complained of pain 8/10 on her right ankle, 10mg of oxycodone was given and  scheduled acetaminophen. Pt was unable to urinate and straight cath was done twice with out put of 500 ml. Pt state that she can't sleep with out Ambien. Writer notified house officer and get an order of Ambien and it was given for sleep. 100 ml normal saline is running continues and NPO after 2200 for procedure tomorrow.                "

## 2023-08-11 NOTE — PROGRESS NOTES
[facility] ACUTE PAIN SERVICE CONSULTATION     Date of Admission:  8/10/2023  Date of Consult (When I saw the patient): 08/11/23  Physician requesting consult: Dr. Desai  Reason for consult: acute pain      Assessment/Plan:     Everett Dixon is a 63 year old female who was admitted on 8/10/2023.  Pain team was asked to see the patient for acute pain. Admitted for Right ankle bimalleolar fracture, dislocation, reduced at bedside - presented to ER after mechanical fall accident.  Ortho following along. History of ctive smoking (quarter pack per day), emphysema (not on home oxygen), prior left ankle fracture requiring surgical management, known history of bilateral PE not currently on anticoagulation who sustained an injury to her right ankle.  Positive for cannabis on Utox on admission.     Pt is high risk for opioid induced respiratory depression d/t the following risk factors: opioid naive, age >60, concomitant cns depressants, emphysema, smoker    Pt has used x 6 doses of IV dilaudid and x 4 doses of po oxycodone in 24 hours.     Pt is upset that nothing is helping with pain. Reporting foot pain radiation up ankle, calf, anxious during assessment. Declines rotation to po dilaudid, Reports not using cannabis often at home. Described pain 8-9/10 during assessment, sharp, constant. For now recommended adding NSAID and scheduling oxycodone dose.     PLAN:   1) Pain is consistent with Right ankle bimalleolar fracture, dislocation, reduced at bedside  2)Multimodal Medication Therapy  Topical: bandage over site  NSAID'S: Ortho recommended toradol and celecoxib - not ordered, order 200 mg po BID  Muscle Relaxants: baclofen 10 mg po TID  Adjuvants: APAP 975 mg po q8h- increase to q6h, gabapentin 600 mg po TID  Antidepressants/anxiolytics: buspar 15 mg BID  Opioids: oxycodone 5-10 mg po q4h prn, schedule oxycodone 5-10 mg po q6h  IV Pain medication: Dilaudid 0.2-0.4 mg IV q2h prn - decreased to qid prn second  line  3)Non-medication interventions: per nursing  Acupuncture consult - if agreeable  Integrative consult - if agreeable  4)Constipation Prophylaxis: Scheduled and prn - add  5) Care Teams:    -[place]  pulled from system on 8/11/23. This indicates last filled ambien 8/6, and gabapentin 6/27 - doesn't fill opioids.   Discharge Recommendations - We recommend prescribing the following at the time of discharge: TBD     History of Present Illness (HPI):       Everett Dixon is a 63 year old female who presented for Right ankle bimalleolar fracture, dislocation, reduced at bedside    Per [place]  review, the patient does not have an opioid tolerance.    Reviewed medical record, labs, imaging, ED note, and care everywhere.     Home pain medications/psych medications/anticoagulation medications include: APAP 1000 mg po bid prn mild pain, baclofen 10 mg po TID - fills regularly from [facility], buspar 15 mg po BID - fills regularly, celebrex 200 mg po daily - fills regularly, cymbalta 60 mg po BID - fills regularly , gabapentin 600 mg po TID - fills regularly, ibuprofen 600 mg po TID    Last UDS: 8/11/23: positive for cannabis      Imaging:   Ankle XR, G/E 3 views, right    Result Date: 8/10/2023  EXAM: XR ANKLE RIGHT G/E 3 VIEWS LOCATION: [facility] DATE: 8/10/2023 INDICATION: Post reduction   portable please COMPARISON: Radiographs from earlier today.     IMPRESSION: Closed reduction and splint replacement since the study from earlier today. There is improved position and alignment of the distal tibia and fibula fractures.     XR Chest 1 View    Result Date: 8/10/2023  EXAM: XR CHEST 1 VIEW LOCATION: [facility] DATE: 8/10/2023 INDICATION: Preop. COMPARISON: None.     IMPRESSION: Right paratracheal density which could represent vascular structure, however adenopathy not excluded. Mild low lung volumes accentuate heart size and pulmonary vascularity. Heart size and pulmonary  vascularity within normal limits. No focal lung infiltrates. No pneumothorax seen. Osseous structures grossly intact. Visualized upper abdomen unremarkable.    XR Tibia and Fibula Right 2 Views    Result Date: 8/10/2023  EXAM: XR ANKLE RIGHT G/E 3 VIEWS, XR TIBIA AND FIBULA RIGHT 2 VIEWS, XR FEMUR RIGHT 2 VIEWS, XR PELVIS AND HIP RIGHT 2 VIEWS LOCATION: [facility] DATE: 8/10/2023 INDICATION: trip and fall, deformity COMPARISON: None.     IMPRESSION: The bones are well-mineralized. The bony pelvis is intact. The femoral head and neck are intact. The hip joint is well aligned. The femur is intact. The included portions of the knee joint are well aligned. Overlying splinting material limits  evaluation of the ankle, there is a displaced bimalleolar ankle fracture with lateral talar shift and angulation. Soft tissue swelling surrounds the ankle. The remainder of the tibia and fibula are intact.    Ankle XR, G/E 3 views, right    Result Date: 8/10/2023  EXAM: XR ANKLE RIGHT G/E 3 VIEWS, XR TIBIA AND FIBULA RIGHT 2 VIEWS, XR FEMUR RIGHT 2 VIEWS, XR PELVIS AND HIP RIGHT 2 VIEWS LOCATION: [facility] DATE: 8/10/2023 INDICATION: trip and fall, deformity COMPARISON: None.     IMPRESSION: The bones are well-mineralized. The bony pelvis is intact. The femoral head and neck are intact. The hip joint is well aligned. The femur is intact. The included portions of the knee joint are well aligned. Overlying splinting material limits  evaluation of the ankle, there is a displaced bimalleolar ankle fracture with lateral talar shift and angulation. Soft tissue swelling surrounds the ankle. The remainder of the tibia and fibula are intact.    XR Pelvis and Hip Right 2 Views    Result Date: 8/10/2023  EXAM: XR ANKLE RIGHT G/E 3 VIEWS, XR TIBIA AND FIBULA RIGHT 2 VIEWS, XR FEMUR RIGHT 2 VIEWS, XR PELVIS AND HIP RIGHT 2 VIEWS LOCATION: [facility] DATE: 8/10/2023  INDICATION: trip and fall, deformity COMPARISON: None.     IMPRESSION: The bones are well-mineralized. The bony pelvis is intact. The femoral head and neck are intact. The hip joint is well aligned. The femur is intact. The included portions of the knee joint are well aligned. Overlying splinting material limits  evaluation of the ankle, there is a displaced bimalleolar ankle fracture with lateral talar shift and angulation. Soft tissue swelling surrounds the ankle. The remainder of the tibia and fibula are intact.    XR Femur Right 2 Views    Result Date: 8/10/2023  EXAM: XR ANKLE RIGHT G/E 3 VIEWS, XR TIBIA AND FIBULA RIGHT 2 VIEWS, XR FEMUR RIGHT 2 VIEWS, XR PELVIS AND HIP RIGHT 2 VIEWS LOCATION: [facility] DATE: 8/10/2023 INDICATION: trip and fall, deformity COMPARISON: None.     IMPRESSION: The bones are well-mineralized. The bony pelvis is intact. The femoral head and neck are intact. The hip joint is well aligned. The femur is intact. The included portions of the knee joint are well aligned. Overlying splinting material limits  evaluation of the ankle, there is a displaced bimalleolar ankle fracture with lateral talar shift and angulation. Soft tissue swelling surrounds the ankle. The remainder of the tibia and fibula are intact.      Jaleel Sheppard, PharmD, BCPS  Acute Care Pain Management Program [facility]   [place] or [place]ia

## 2023-08-11 NOTE — PROGRESS NOTES
"Orthopedic Progress Note      Assessment:    Right ankle bimalleolar fracture, dislocation, reduced    Plan:   - No concern for compartment syndrome.  Toes wiggle and are warm, well perfused.  Continue to monitor.   - Aggressive elevation of RLE  - Weightbearing status: NWB RLE  - Pain Management per primary;  Toradol loading dose and celecoxib.  Could consider IP pain team consult for further recommendations if unable to achieve pain control  - Discharge: Follow up outpatient with ankle surgeon following discharge for further management and scheduling of outpatient ORIF    Ortho will sign off. Please feel free to reach out with any further questions or concerns.       Subjective:    Patient reports 10/10 pain not well controlled on IV pain meds.  All questions/concerns answered.      Objective:  BP (!) 142/85 (BP Location: Left arm)   Pulse 73   Temp 98  F (36.7  C) (Oral)   Resp 16   Ht 1.6 m (5' 3\")   Wt 59 kg (130 lb)   LMP  (LMP Unknown)   SpO2 99%   BMI 23.03 kg/m      Patient is A&Ox3, lying in bed  Calves without tenderness, neg Ling's  Brisk capillary refill in the toes.   Unable to palpate pulses due to splint  Right foot warm & well-perfused.  Sensation is intact to light touch & equal bilaterally in toes  Splint clean and dry - wrapping is not too tight    Contralateral side= Full range of motion, Negative joint instability findings, 5/5 motor groups about the joint, Non-tender.       Imaging  EXAM: XR ANKLE RIGHT G/E 3 VIEWS  LOCATION: [facility]  DATE: 8/10/2023     INDICATION: Post reduction   portable please  COMPARISON: Radiographs from earlier today.                                                                      IMPRESSION: Closed reduction and splint replacement since the study from earlier today. There is improved position and alignment of the distal tibia and fibula fractures.    Pertinent Labs   Lab Results: personally reviewed.   Lab Results   Component " Value Date    INR 0.96 08/10/2023    INR 1.00 08/10/2023     Lab Results   Component Value Date    WBC 11.3 (H) 08/10/2023    HGB 10.9 (L) 08/10/2023    HCT 33.7 (L) 08/10/2023    MCV 97 08/10/2023     08/10/2023     Lab Results   Component Value Date     08/11/2023    CO2 21 (L) 08/11/2023         Report completed by:  ABHI TIERNEY PA-C  Date: 08/11/2023  [facility]

## 2023-08-11 NOTE — PROGRESS NOTES
Care Management Initial Consult    General Information  Assessment completed with: Patient,    Type of CM/SW Visit: Initial Assessment    Primary Care Provider verified and updated as needed: No   Readmission within the last 30 days: no previous admission in last 30 days      Reason for Consult: discharge planning  Advance Care Planning: Advance Care Planning Reviewed: no concerns identified          Communication Assessment  Patient's communication style: spoken language (English or Bilingual)             Cognitive  Cognitive/Neuro/Behavioral: WDL                      Living Environment:   People in home: alone     Current living Arrangements: house      Able to return to prior arrangements: other (see comments)  Living Arrangement Comments:  (is non-weight bearing on r foot)    Family/Social Support:  Care provided by: self  Provides care for: no one  Marital Status: Single  Sibling(s)          Description of Support System: Supportive    Support Assessment: Lacks necessary supervision and assistance    Current Resources:   Patient receiving home care services: No     Community Resources: None  Equipment currently used at home:    Supplies currently used at home: None    Employment/Financial:  Employment Status: employed full-time        Financial Concerns: No concerns identified           Does the patient's insurance plan have a 3 day qualifying hospital stay waiver?  No    Lifestyle & Psychosocial Needs:  Social Determinants of Health     Tobacco Use: Not on file   Alcohol Use: Not on file   Financial Resource Strain: Not on file   Food Insecurity: Not on file   Transportation Needs: Not on file   Physical Activity: Not on file   Stress: Not on file   Social Connections: Not on file   Intimate Partner Violence: Not on file   Depression: Not on file   Housing Stability: Not on file       Functional Status:  Prior to admission patient needed assistance:   Dependent ADLs:: Independent  Dependent IADLs::  Independent  Assesssment of Functional Status: Not at baseline with ADL Functioning    Mental Health Status:  Mental Health Status: No Current Concerns       Chemical Dependency Status:  Chemical Dependency Status: No Current Concerns             Values/Beliefs:  Spiritual, Cultural Beliefs, Worship Practices, Values that affect care:                 Additional Information:  Pt is a 63 year old female who was admitted for a close bimalleolar fracture of the right ankle.    Pt lives alone and is independent at baseline.  Family can provide transportation, plan is to return home    Pt is non weight bearing on her R ankle.  She is in a lot of pain at this time.  She may need to go to TCU at discharge due to her non-weight bearing status.     Waiting on PT/OT recommendations.    CM will continue to follow    Shae Araya RN

## 2023-08-11 NOTE — CONSULTS
Tobacco Treatment Consult  8/11/2023, 12:07 PM    Patient Admitted for: Closed right ankle fracture [S82.891A]  Closed bimalleolar fracture of right ankle, initial encounter [S82.841A] on 8/10/2023    History of Tobacco Use:   Tobacco Product(s):cigarette  Amount used: 1/2 pack/day  Number of quit attempts in past: 1  Longest period of without use: 3 weeks  Pharmacotherapy tried in the past: patch  Pharmacotherapy tried that has been beneficial: patch  Pharmacotherapy tried that has not been beneficial or was not tolerated:  none identified    Assessment:   Readiness to quit/planning to quit: contemplative  Emotional Triggers:nervous or stressed and pain  -Everett is interested in making a quit attempt in the near future. However, she is experiencing considerable pain from her injury and does not feel that she is capable of attempting until her pain resolves. She states that she is considering joining a cessation program near where she lives that is through [facility] when she is ready to make a quit attempt. She was interested in receiving an information/resource packet from me.    Education done during visit:  -Discussed triggers and response to them  -Discussed benefits to quitting tobacco use  -Educated on physical benefits to health of tobacco cessation  -Education on use of pharmacotherapy products and discussed options to determine what may work best for patient.  -Issued tobacco cessation info/resource packet.      Recommendations:  -In the hospital recommend the patient have the following pharmacotherapy: ContinuePatch 14 mg  -Upon discharge recommend the patient have the following pharmacotherapy: Patch 14 mg and Gum 2 mg every 2-3 hours   -Taper NRT recommended after 4 weeks of successful cessation.  Patch down to 7 mg for 4 weeks.  -Continued encouragement from health care providers to quit and stay tobacco free.  -Updated smoking status in chart as this had not been assessed in social history  Everett  has our service's contact information should she have questions or want support post-discharge. Will continue to be available to patient throughout hospital stay.    Total 5 minutes spent in smoking cessation, and 35 minutes spent in chart review,care coordination, and documentation.    Matilda Bradshaw RT, Chronic Pulmonary Disease Specialist, Certified Tobacco Treatment Specialist  Phone [phone]

## 2023-08-11 NOTE — PLAN OF CARE
Problem: Pain Acute  Goal: Optimal Pain Control and Function  Outcome: Not Progressing  Intervention: Develop Pain Management Plan  Recent Flowsheet Documentation  Taken 8/11/2023 0651 by Sarah Maldonado RN  Pain Management Interventions: medication (see MAR)  Taken 8/11/2023 0430 by Sarah Maldonado RN  Pain Management Interventions: medication (see MAR)  Taken 8/11/2023 0317 by Sarah Maldonado RN  Pain Management Interventions: medication (see MAR)  Taken 8/11/2023 0205 by Sarah Maldonado RN  Pain Management Interventions: (cold applied) medication (see MAR)  Taken 8/11/2023 0113 by Sarah Maldonado RN  Pain Management Interventions: (elevation) medication (see MAR)  Taken 8/11/2023 0040 by Sarah Maldonado RN  Pain Management Interventions:   medication (see MAR)   pillow support provided  Intervention: Prevent or Manage Pain  Recent Flowsheet Documentation  Taken 8/11/2023 0030 by Sarah Maldonado RN  Medication Review/Management: medications reviewed   Goal Outcome Evaluation:   Pt had a rough night.  Did not sleep at all.  Made many attempts to get comfortable with repositioning but pain was too intense per pt.  Leg elevated all night.  Right foot splint intact. Pt able to wiggle her toes but fluctuates as to how easy it is for her.   Multiple calls to the MD overnight but despite that,  pain was difficult to get under control.  Pt did have success with voiding on the bedpan and thus reynolds catheter was not necessary.

## 2023-08-11 NOTE — PLAN OF CARE
"  Problem: Plan of Care - These are the overarching goals to be used throughout the patient stay.    Goal: Plan of Care Review  Description: The Plan of Care Review/Shift note should be completed every shift.  The Outcome Evaluation is a brief statement about your assessment that the patient is improving, declining, or no change.  This information will be displayed automatically on your shift note.  8/11/2023 1858 by Meagan Gonzalez RN  Outcome: Progressing  8/11/2023 1856 by Meagan Gonzalez RN  Outcome: Progressing  Goal: Patient-Specific Goal (Individualized)  Description: You can add care plan individualizations to a care plan. Examples of Individualization might be:  \"Parent requests to be called daily at 9am for status\", \"I have a hard time hearing out of my right ear\", or \"Do not touch me to wake me up as it startles me\".  8/11/2023 1858 by Meagan Gonzalez RN  Outcome: Progressing  8/11/2023 1856 by Meagan Gonzalez RN  Outcome: Progressing  Goal: Absence of Hospital-Acquired Illness or Injury  8/11/2023 1858 by Meagan Gonzalez RN  Outcome: Progressing  8/11/2023 1856 by Meagan Gonzalez RN  Outcome: Progressing  Intervention: Identify and Manage Fall Risk  Recent Flowsheet Documentation  Taken 8/11/2023 1600 by Meagan Gonzalez, RN  Safety Promotion/Fall Prevention:   safety round/check completed   room organization consistent   room near nurse's station   toileting scheduled   lighting adjusted   clutter free environment maintained  Taken 8/11/2023 0900 by Meagan Gonzalez, RN  Safety Promotion/Fall Prevention:   safety round/check completed   room organization consistent   room near nurse's station   toileting scheduled   lighting adjusted   clutter free environment maintained  Intervention: Prevent Skin Injury  Recent Flowsheet Documentation  Taken 8/11/2023 1600 by Meagan Gonzalez, RN  Body Position:   position changed independently   supine, legs elevated   supine, head " elevated  Goal: Optimal Comfort and Wellbeing  8/11/2023 1858 by Meagan Gonzalez RN  Outcome: Progressing  8/11/2023 1856 by Meagan Gonzalez RN  Outcome: Progressing  Goal: Readiness for Transition of Care  8/11/2023 1858 by Meagan Gonzalez RN  Outcome: Progressing  8/11/2023 1856 by Meagan Gonzalez RN  Outcome: Progressing     Problem: Pain Acute  Goal: Optimal Pain Control and Function  Outcome: Met  Intervention: Prevent or Manage Pain  Recent Flowsheet Documentation  Taken 8/11/2023 1600 by Meagan Gonzalez RN  Medication Review/Management: medications reviewed  Taken 8/11/2023 0900 by Meagan Gonzalez RN  Medication Review/Management: medications reviewed     Problem: Pain Acute  Goal: Optimal Pain Control and Function  Outcome: Progressing  Intervention: Prevent or Manage Pain  Recent Flowsheet Documentation  Taken 8/11/2023 1600 by Meagan Gonzalez RN  Medication Review/Management: medications reviewed   Goal Outcome Evaluation:  Pt continues with elevated pain level 8-10. MD notified. Sharp pains in leg/ankle. Can wiggle toes, warm to touch. Continue to monitor O2 sats, RR. O2 placed @ 2L as pt states she is wishing to sleep. Sating % RA while awake.

## 2023-08-11 NOTE — PROVIDER NOTIFICATION
Pt not getting relief from iv dilaudid and requesting an extra dose.    0040--an extra one time 0.2mg dilaudid given.

## 2023-08-11 NOTE — PROGRESS NOTES
[facility]    Medicine Progress Note - Hospitalist Service    Date of Admission:  8/10/2023    Assessment & Plan   Everett Dixon is a 63 year old female admitted on 8/10/2023. She presented to the ER after a mechanical fall accident.  Right ankle x-ray with displaced bimalleolar ankle fracture with lateral talar shift and angulation.     Mechanical fall  Right foot pain  Closed right ankle fracture  -Sustained a mechanical fall accident in her niece's house while getting out of bathroom she was tipped and fell.  Right ankle x-ray significant for displaced bimalleolar ankle fracture  -Had a remote history of left ankle fracture in 2003  -[facility] following, did reduction at bedside. Signed off now.  -Pain control with dilaudid, tylenol, oxy.  -Pain team consulted.   -NWB in RLE and aggressive elevation     Active smoker  -Counseling, nicotine patch or gum    Elevated BP with no diagnosis of hypertension  -Manage pain adequately.  -Hydralazine prn ordered.    Mild MARVIN - resolved.    HypoK - replete per protocol  Check Mg.        Diet: Regular Diet Adult    DVT Prophylaxis: SCD  [place] Catheter: Not present  Lines: None     Cardiac Monitoring: None  Code Status: Full Code      Clinically Significant Risk Factors        # Hypokalemia: Lowest K = 3.2 mmol/L in last 2 days, will replace as needed    # Hypercalcemia: Highest Ca = 10.2 mg/dL in last 2 days, will monitor as appropriate                        Disposition Plan      Expected Discharge Date: 08/14/2023    Discharge Delays: PT Disposition recs needed  Destination: home            Halle Desai MD  Hospitalist Service  [facility]  Securely message with [employer] (more info)  Text page via [employer] Paging/Directory   ______________________________________________________________________    Interval History   Chart reviewed and events noted.  Patient seen and examined.   In lot of pain RLE, 10/10. Reviewed overnight  events.None of the current pain meds seem to be working per patient.       Physical Exam   Vital Signs: Temp: 98.2  F (36.8  C) Temp src: Oral BP: (!) 141/89 Pulse: 76   Resp: 16 SpO2: 100 % O2 Device: None (Room air) Oxygen Delivery: 1 LPM  Weight: 130 lbs 0 oz    General: In discomfort  HEENT:EOMI, AT,NC  CVS:RRR  RS:Easy breathing  Ext: RLE in splint.   Psy:Approrpiate affect        Medical Decision Making       >45 mins spent in chart review, history, examination, labs and discussing plan with patient, bedside RN and pain team.    Data     I have personally reviewed the following data over the past 24 hrs:    N/A  \   N/A   / N/A     138 105 9.1 /  88   3.2 (L) 21 (L) 0.62 \

## 2023-08-11 NOTE — PROVIDER NOTIFICATION
Pt's ankle has been difficult to get under control this shift.  Despite alternating prn meds and giving scheduled tylenol pt is still rating pain 8-9/10 and is visibly uncomfortable.  Requesting possibly anti-inflammatory.    0430--MD on the floor to see pt.      0442--Flexeril given per MD order.

## 2023-08-12 ENCOUNTER — APPOINTMENT (OUTPATIENT)
Dept: RADIOLOGY | Facility: HOSPITAL | Age: 63
DRG: 493 | End: 2023-08-12
Attending: INTERNAL MEDICINE
Payer: COMMERCIAL

## 2023-08-12 LAB
MAGNESIUM SERPL-MCNC: 2.1 MG/DL (ref 1.7–2.3)
POTASSIUM SERPL-SCNC: 4.2 MMOL/L (ref 3.4–5.3)

## 2023-08-12 PROCEDURE — 250N000011 HC RX IP 250 OP 636: Mod: JZ | Performed by: INTERNAL MEDICINE

## 2023-08-12 PROCEDURE — 250N000013 HC RX MED GY IP 250 OP 250 PS 637: Performed by: PAIN MEDICINE

## 2023-08-12 PROCEDURE — 120N000001 HC R&B MED SURG/OB

## 2023-08-12 PROCEDURE — 99233 SBSQ HOSP IP/OBS HIGH 50: CPT | Performed by: INTERNAL MEDICINE

## 2023-08-12 PROCEDURE — 250N000013 HC RX MED GY IP 250 OP 250 PS 637: Performed by: STUDENT IN AN ORGANIZED HEALTH CARE EDUCATION/TRAINING PROGRAM

## 2023-08-12 PROCEDURE — 83735 ASSAY OF MAGNESIUM: CPT | Performed by: HOSPITALIST

## 2023-08-12 PROCEDURE — 99222 1ST HOSP IP/OBS MODERATE 55: CPT | Performed by: PAIN MEDICINE

## 2023-08-12 PROCEDURE — 73600 X-RAY EXAM OF ANKLE: CPT | Mod: RT

## 2023-08-12 PROCEDURE — 84132 ASSAY OF SERUM POTASSIUM: CPT | Performed by: HOSPITALIST

## 2023-08-12 PROCEDURE — 250N000011 HC RX IP 250 OP 636: Performed by: INTERNAL MEDICINE

## 2023-08-12 PROCEDURE — 250N000013 HC RX MED GY IP 250 OP 250 PS 637: Performed by: HOSPITALIST

## 2023-08-12 PROCEDURE — 36415 COLL VENOUS BLD VENIPUNCTURE: CPT | Performed by: HOSPITALIST

## 2023-08-12 PROCEDURE — 250N000011 HC RX IP 250 OP 636: Mod: JZ | Performed by: PAIN MEDICINE

## 2023-08-12 RX ORDER — HYDROMORPHONE HCL IN WATER/PF 6 MG/30 ML
0.4 PATIENT CONTROLLED ANALGESIA SYRINGE INTRAVENOUS EVERY 6 HOURS PRN
Status: DISCONTINUED | OUTPATIENT
Start: 2023-08-12 | End: 2023-08-16

## 2023-08-12 RX ORDER — OXYCODONE HYDROCHLORIDE 5 MG/1
10 TABLET ORAL EVERY 6 HOURS
Status: DISCONTINUED | OUTPATIENT
Start: 2023-08-12 | End: 2023-08-12

## 2023-08-12 RX ORDER — OXYCODONE HYDROCHLORIDE 5 MG/1
10 TABLET ORAL
Status: DISCONTINUED | OUTPATIENT
Start: 2023-08-12 | End: 2023-08-12

## 2023-08-12 RX ORDER — HYDROMORPHONE HYDROCHLORIDE 4 MG/1
4 TABLET ORAL
Status: DISCONTINUED | OUTPATIENT
Start: 2023-08-12 | End: 2023-08-16

## 2023-08-12 RX ORDER — HYDROMORPHONE HCL IN WATER/PF 6 MG/30 ML
0.2 PATIENT CONTROLLED ANALGESIA SYRINGE INTRAVENOUS EVERY 6 HOURS PRN
Status: DISCONTINUED | OUTPATIENT
Start: 2023-08-12 | End: 2023-08-12

## 2023-08-12 RX ORDER — HYDROMORPHONE HYDROCHLORIDE 4 MG/1
4 TABLET ORAL EVERY 6 HOURS
Status: DISCONTINUED | OUTPATIENT
Start: 2023-08-12 | End: 2023-08-18 | Stop reason: HOSPADM

## 2023-08-12 RX ORDER — HYDROXYZINE HYDROCHLORIDE 25 MG/1
50 TABLET, FILM COATED ORAL EVERY 4 HOURS PRN
Status: DISCONTINUED | OUTPATIENT
Start: 2023-08-12 | End: 2023-08-18 | Stop reason: HOSPADM

## 2023-08-12 RX ORDER — POLYETHYLENE GLYCOL 3350 17 G/17G
17 POWDER, FOR SOLUTION ORAL DAILY
Status: DISCONTINUED | OUTPATIENT
Start: 2023-08-12 | End: 2023-08-15

## 2023-08-12 RX ADMIN — BETAMETHASONE DIPROPIONATE: 0.5 LOTION TOPICAL at 08:24

## 2023-08-12 RX ADMIN — OXYCODONE HYDROCHLORIDE 10 MG: 5 TABLET ORAL at 00:42

## 2023-08-12 RX ADMIN — HYDROMORPHONE HYDROCHLORIDE 4 MG: 4 TABLET ORAL at 16:03

## 2023-08-12 RX ADMIN — HYDROMORPHONE HYDROCHLORIDE 1 MG: 1 INJECTION, SOLUTION INTRAMUSCULAR; INTRAVENOUS; SUBCUTANEOUS at 17:12

## 2023-08-12 RX ADMIN — OXYCODONE HYDROCHLORIDE 10 MG: 5 TABLET ORAL at 13:20

## 2023-08-12 RX ADMIN — HYDROMORPHONE HYDROCHLORIDE 0.2 MG: 0.2 INJECTION, SOLUTION INTRAMUSCULAR; INTRAVENOUS; SUBCUTANEOUS at 09:17

## 2023-08-12 RX ADMIN — OXYCODONE HYDROCHLORIDE 10 MG: 5 TABLET ORAL at 12:08

## 2023-08-12 RX ADMIN — BACLOFEN 10 MG: 10 TABLET ORAL at 08:24

## 2023-08-12 RX ADMIN — HYDROMORPHONE HYDROCHLORIDE 4 MG: 4 TABLET ORAL at 21:11

## 2023-08-12 RX ADMIN — OXYCODONE HYDROCHLORIDE 10 MG: 5 TABLET ORAL at 07:10

## 2023-08-12 RX ADMIN — BUSPIRONE HYDROCHLORIDE 15 MG: 15 TABLET ORAL at 21:12

## 2023-08-12 RX ADMIN — DULOXETINE HYDROCHLORIDE 60 MG: 60 CAPSULE, DELAYED RELEASE PELLETS ORAL at 08:23

## 2023-08-12 RX ADMIN — ACETAMINOPHEN 975 MG: 325 TABLET ORAL at 00:41

## 2023-08-12 RX ADMIN — POLYETHYLENE GLYCOL 3350 17 G: 17 POWDER, FOR SOLUTION ORAL at 09:01

## 2023-08-12 RX ADMIN — HYDROMORPHONE HYDROCHLORIDE 0.4 MG: 0.2 INJECTION, SOLUTION INTRAMUSCULAR; INTRAVENOUS; SUBCUTANEOUS at 04:03

## 2023-08-12 RX ADMIN — GABAPENTIN 600 MG: 300 CAPSULE ORAL at 08:23

## 2023-08-12 RX ADMIN — BACLOFEN 10 MG: 10 TABLET ORAL at 21:11

## 2023-08-12 RX ADMIN — ACETAMINOPHEN 975 MG: 325 TABLET ORAL at 12:08

## 2023-08-12 RX ADMIN — ATORVASTATIN CALCIUM 20 MG: 10 TABLET, FILM COATED ORAL at 08:23

## 2023-08-12 RX ADMIN — BETAMETHASONE DIPROPIONATE: 0.5 LOTION TOPICAL at 21:14

## 2023-08-12 RX ADMIN — Medication 250 MG: at 08:23

## 2023-08-12 RX ADMIN — CELECOXIB 200 MG: 200 CAPSULE ORAL at 08:23

## 2023-08-12 RX ADMIN — GABAPENTIN 700 MG: 100 CAPSULE ORAL at 21:12

## 2023-08-12 RX ADMIN — SENNOSIDES AND DOCUSATE SODIUM 1 TABLET: 50; 8.6 TABLET ORAL at 08:23

## 2023-08-12 RX ADMIN — Medication 25 MCG: at 08:23

## 2023-08-12 RX ADMIN — FLUTICASONE FUROATE AND VILANTEROL TRIFENATATE 1 PUFF: 100; 25 POWDER RESPIRATORY (INHALATION) at 08:23

## 2023-08-12 RX ADMIN — OXYCODONE HYDROCHLORIDE 10 MG: 5 TABLET ORAL at 05:17

## 2023-08-12 RX ADMIN — CELECOXIB 200 MG: 200 CAPSULE ORAL at 21:13

## 2023-08-12 RX ADMIN — BACLOFEN 10 MG: 10 TABLET ORAL at 14:16

## 2023-08-12 RX ADMIN — HYDROMORPHONE HYDROCHLORIDE 4 MG: 4 TABLET ORAL at 15:04

## 2023-08-12 RX ADMIN — OXYCODONE HYDROCHLORIDE 10 MG: 5 TABLET ORAL at 10:21

## 2023-08-12 RX ADMIN — HYDROXYZINE HYDROCHLORIDE 50 MG: 25 TABLET, FILM COATED ORAL at 13:20

## 2023-08-12 RX ADMIN — GABAPENTIN 700 MG: 100 CAPSULE ORAL at 14:16

## 2023-08-12 RX ADMIN — ACETAMINOPHEN 975 MG: 325 TABLET ORAL at 07:11

## 2023-08-12 RX ADMIN — BUSPIRONE HYDROCHLORIDE 15 MG: 15 TABLET ORAL at 08:24

## 2023-08-12 RX ADMIN — DULOXETINE HYDROCHLORIDE 60 MG: 60 CAPSULE, DELAYED RELEASE PELLETS ORAL at 21:13

## 2023-08-12 RX ADMIN — SENNOSIDES AND DOCUSATE SODIUM 1 TABLET: 50; 8.6 TABLET ORAL at 21:12

## 2023-08-12 ASSESSMENT — ACTIVITIES OF DAILY LIVING (ADL)
ADLS_ACUITY_SCORE: 45
WALKING_OR_CLIMBING_STAIRS: AMBULATION DIFFICULTY, REQUIRES EQUIPMENT
ADLS_ACUITY_SCORE: 45
ADLS_ACUITY_SCORE: 32
DRESSING/BATHING_DIFFICULTY: NO
DIFFICULTY_EATING/SWALLOWING: NO
VISION_MANAGEMENT: GLASSES
CONCENTRATING,_REMEMBERING_OR_MAKING_DECISIONS_DIFFICULTY: NO
ADLS_ACUITY_SCORE: 32
CHANGE_IN_FUNCTIONAL_STATUS_SINCE_ONSET_OF_CURRENT_ILLNESS/INJURY: YES
TRANSFERRING: 1-->ASSISTANCE (EQUIPMENT/PERSON) NEEDED
ADLS_ACUITY_SCORE: 32
DOING_ERRANDS_INDEPENDENTLY_DIFFICULTY: NO
TRANSFERRING: 1-->ASSISTANCE (EQUIPMENT/PERSON) NEEDED (NOT DEVELOPMENTALLY APPROPRIATE)
ADLS_ACUITY_SCORE: 32
ADLS_ACUITY_SCORE: 45
TOILETING_ISSUES: NO
FALL_HISTORY_WITHIN_LAST_SIX_MONTHS: YES
ADLS_ACUITY_SCORE: 32
WEAR_GLASSES_OR_BLIND: YES
WALKING_OR_CLIMBING_STAIRS_DIFFICULTY: YES
ADLS_ACUITY_SCORE: 45
ADLS_ACUITY_SCORE: 32
ADLS_ACUITY_SCORE: 45
ADLS_ACUITY_SCORE: 32
NUMBER_OF_TIMES_PATIENT_HAS_FALLEN_WITHIN_LAST_SIX_MONTHS: 1

## 2023-08-12 NOTE — CONSULTS
"[facility] ACUTE PAIN SERVICE    ([facility], [facility], [facility])  Daily PAIN Progress Note    Assessment/Plan:  Everett Dixon is a 63 year old female who was admitted on 8/10/2023.  I was asked to see the patient for fracture pain. Admitted for fall with displaced bimalleolar ankle fracture with lateral talar shift and angulation. History of smoking & chronic pain on gabapentin, medically disabled, depression, anxiety, emphysema.    shows ambien and gabapentin use. Describes pain as 7/10 and much better.      Addendum 1330: discussed with patient and nurse Chanelle again. Pt with severe pain. Will rotate to po dilaudid. Reconsult ortho- pt feels that cast is \"rubbing  and is too tight\".     PLAN:  Acute pain secondary to fracture of ankle. Opioid naive. Agree with scheduled oxycodone, would minimize IV dilaudid.   Multimodal Medication Therapy:   Topical: bandage over site  NSAID'S: Ortho recommended celecoxib - 200 mg po BID  Muscle Relaxants: baclofen 10 mg po TID  Adjuvants: APAP 975 mg q6h, gabapentin 600 mg po TID  Antidepressants/anxiolytics: buspar 15 mg BID  Opioids: discontinue oxycodone and replace with Dilaudid 4 mg every 6 scheduled and 4 mg every 3 as needed  IV Pain medication: decrease IV to q6h prn   Constipation management: No BM since admission.  Will schedule MiraLAX and continue senna  Discharge plan- Oxycodone per ortho. Suggest DXA scan.         Subjective:    Pain is much better, rated 7/10.  Patient notes that pain well is severe on mission up to 10/10.  She feels that the current plan has been extremely helpful for her pain.  We talked about opioids and constipation risk as well as addiction risk.  We talked about Celebrex.  She notes that the 10 mg dose of oxycodone has been quite effective.  She notes that she slipped on hair conditioner in her bathroom.  She states, \"I have osteoarthritis and that is why it fractured\".  Described the difference between osteoarthritis " "and osteoporosis.  She tells me that she is medically disabled and was previously employed as a .           Closed bimalleolar fracture of right ankle, initial encounter   Patient Active Problem List   Diagnosis    Closed bimalleolar fracture of right ankle, initial encounter    Closed right ankle fracture        History   Drug Use Not on file         Tobacco Use      Smoking status: Every Day        Packs/day: 0.50        Years: 40.00        Pack years: 20        Types: Cigarettes      Smokeless tobacco: Never      Tobacco comments: Seen IP by CTTS on 8/11/23- contemplating quitting in the future         acetaminophen  975 mg Oral Q6H    atorvastatin  20 mg Oral Daily    baclofen  10 mg Oral TID    betamethasone dipropionate   Topical BID    busPIRone  15 mg Oral BID    celecoxib  200 mg Oral BID    DULoxetine  60 mg Oral BID    fluticasone-vilanterol  1 puff Inhalation Daily    gabapentin  600 mg Oral TID    Magnesium Oxide  250 mg Oral Daily    nicotine  1 patch Transdermal Daily    nicotine   Transdermal Q8H    oxyCODONE  10 mg Oral Q6H    senna-docusate  1 tablet Oral BID    sodium chloride (PF)  3 mL Intracatheter Q8H    Vitamin D3  25 mcg Oral Daily       Objective:  Vital signs in last 24 hours:  B/P: 140/81, T: 98.1, P: 77, R: 16   Blood pressure (!) 140/81, pulse 77, temperature 98.1  F (36.7  C), temperature source Oral, resp. rate 16, height 1.6 m (5' 3\"), weight 59 kg (130 lb), SpO2 100 %.      Weight:   Wt Readings from Last 2 Encounters:   08/10/23 59 kg (130 lb)           Intake/Output:    Intake/Output Summary (Last 24 hours) at 8/12/2023 0748  Last data filed at 8/12/2023 0400  Gross per 24 hour   Intake 1380 ml   Output --   Net 1380 ml        Review of Systems:   As per subjective, all others negative.    Physical Exam:     General Appearance:  Alert, cooperative, no distress, appears stated age   Patient is in bed with her foot up   Head:  Normocephalic, without obvious abnormality, " atraumatic   Eyes:  PERRL, conjunctiva/corneas clear, EOM's intact   ENT/Throat: Lips moist   Lymph/Neck: Supple, symmetrical, trachea midline, no adenopathy, thyroid: not enlarged, symmetric    Lungs:    , respirations unlabored   Chest Wall:  No tenderness or deformity   Cardiovascular/Heart:  No SOB   Abdomen:   Soft, non-tender,     Musculoskeletal: Lower extremity is casted toes are warm and dry   Skin: Skin is tanned   Neurologic: Alert and oriented X 3, Moves all 4 extremities including the fracture site          Imaging:  Personally Reviewed.    Results for orders placed or performed during the hospital encounter of 08/10/23   XR Tibia and Fibula Right 2 Views    Impression    IMPRESSION: The bones are well-mineralized. The bony pelvis is intact. The femoral head and neck are intact. The hip joint is well aligned. The femur is intact. The included portions of the knee joint are well aligned. Overlying splinting material limits   evaluation of the ankle, there is a displaced bimalleolar ankle fracture with lateral talar shift and angulation. Soft tissue swelling surrounds the ankle. The remainder of the tibia and fibula are intact.   Ankle XR, G/E 3 views, right    Impression    IMPRESSION: The bones are well-mineralized. The bony pelvis is intact. The femoral head and neck are intact. The hip joint is well aligned. The femur is intact. The included portions of the knee joint are well aligned. Overlying splinting material limits   evaluation of the ankle, there is a displaced bimalleolar ankle fracture with lateral talar shift and angulation. Soft tissue swelling surrounds the ankle. The remainder of the tibia and fibula are intact.   XR Pelvis and Hip Right 2 Views    Impression    IMPRESSION: The bones are well-mineralized. The bony pelvis is intact. The femoral head and neck are intact. The hip joint is well aligned. The femur is intact. The included portions of the knee joint are well aligned. Overlying  splinting material limits   evaluation of the ankle, there is a displaced bimalleolar ankle fracture with lateral talar shift and angulation. Soft tissue swelling surrounds the ankle. The remainder of the tibia and fibula are intact.   XR Femur Right 2 Views    Impression    IMPRESSION: The bones are well-mineralized. The bony pelvis is intact. The femoral head and neck are intact. The hip joint is well aligned. The femur is intact. The included portions of the knee joint are well aligned. Overlying splinting material limits   evaluation of the ankle, there is a displaced bimalleolar ankle fracture with lateral talar shift and angulation. Soft tissue swelling surrounds the ankle. The remainder of the tibia and fibula are intact.   XR Chest 1 View    Impression    IMPRESSION: Right paratracheal density which could represent vascular structure, however adenopathy not excluded. Mild low lung volumes accentuate heart size and pulmonary vascularity. Heart size and pulmonary vascularity within normal limits. No focal   lung infiltrates. No pneumothorax seen. Osseous structures grossly intact. Visualized upper abdomen unremarkable.   Ankle XR, G/E 3 views, right    Impression    IMPRESSION: Closed reduction and splint replacement since the study from earlier today. There is improved position and alignment of the distal tibia and fibula fractures.                Lab Results:  Personally Reviewed.   Last Comprehensive Metabolic Panel:  Sodium   Date Value Ref Range Status   08/11/2023 138 136 - 145 mmol/L Final     Potassium   Date Value Ref Range Status   08/12/2023 4.2 3.4 - 5.3 mmol/L Final     Chloride   Date Value Ref Range Status   08/11/2023 105 98 - 107 mmol/L Final     Carbon Dioxide (CO2)   Date Value Ref Range Status   08/11/2023 21 (L) 22 - 29 mmol/L Final     Anion Gap   Date Value Ref Range Status   08/11/2023 12 7 - 15 mmol/L Final     Glucose   Date Value Ref Range Status   08/11/2023 88 70 - 99 mg/dL Final      Urea Nitrogen   Date Value Ref Range Status   08/11/2023 9.1 8.0 - 23.0 mg/dL Final     Creatinine   Date Value Ref Range Status   08/11/2023 0.62 0.51 - 0.95 mg/dL Final     GFR Estimate   Date Value Ref Range Status   08/11/2023 >90 >60 mL/min/1.73m2 Final     Calcium   Date Value Ref Range Status   08/11/2023 9.6 8.8 - 10.2 mg/dL Final        UA:   Amphetamines Urine   Date Value Ref Range Status   08/10/2023 Screen Negative Screen Negative Final     Comment:     Cutoff for a negative amphetamine is less than 500 ng/mL.     Barbituates Urine   Date Value Ref Range Status   08/10/2023 Screen Negative Screen Negative Final     Comment:     Cutoff for a negative barbiturate is less than 200 ng/mL.     Cannabinoids Urine   Date Value Ref Range Status   08/10/2023 Screen Positive (A) Screen Negative Final     Comment:     Cutoff for a positive cannabinoid is 50 ng/mL or greater.   This is an unconfirmed screening result to be used for medical purposes only.     Cocaine Urine   Date Value Ref Range Status   08/10/2023 Screen Negative Screen Negative Final     Comment:     Cutoff for a negative cocaine is less than 300 ng/mL.     Opiates Urine   Date Value Ref Range Status   08/10/2023 Screen Negative Screen Negative Final     Comment:     Cutoff for a negative opiate is less than 300 ng/mL.     PCP Urine   Date Value Ref Range Status   08/10/2023 Screen Negative Screen Negative Final     Comment:     Cutoff for a negative PCP is less than 25 ng/mL.              Please see A&P for additional details of medical decision making.  Medical complexity over the past 24 hours:  -------------------------- HIGH RISK FOR MORBIDITY -------------------------------------------------------------  - Parenteral (IV) CONTROLLED SUBSTANCES ordered    Today I met with the patient. Discussed pain management with the c patient.  Reviewed labs.  Decrease IV pain medication.          Alexia Panchal APRN, CNS-BC, CNP, ACHPN  Acute Care  Pain Management Program   Hours of pain coverage 7a-1700- after 1700 please call the house officer   [facility] ([place], [place], [place], [place], [place])   Page via [employer]- Click HERE to page Alexia or Jia text web console

## 2023-08-12 NOTE — PLAN OF CARE
"Goal Outcome Evaluation:      Plan of Care Reviewed With: patient    Overall Patient Progress: improvingOverall Patient Progress: improving    Outcome Evaluation: Pt alert and oriented. Pain is a constant 8 but came down to a 7 when controlled with IV Dilaudid and oral oxy. Pain is in right heel and radiates up to right knee. Cast is C/D/I, right foot is warm, can wiggle toes. Bedpan for urinating. VSS. Slept between cares.    BP (!) (P) 140/67   Pulse (P) 79   Temp (P) 98.2  F (36.8  C) (Oral)   Resp (P) 16   Ht 1.6 m (5' 3\")   Wt 59 kg (130 lb)   LMP  (LMP Unknown)   SpO2 (P) 95%   BMI 23.03 kg/m      Chace Jo RN    "

## 2023-08-12 NOTE — PLAN OF CARE
"  Problem: Plan of Care - These are the overarching goals to be used throughout the patient stay.    Goal: Plan of Care Review  Description: The Plan of Care Review/Shift note should be completed every shift.  The Outcome Evaluation is a brief statement about your assessment that the patient is improving, declining, or no change.  This information will be displayed automatically on your shift note.  Outcome: Progressing  Flowsheets (Taken 8/12/2023 1434)  Plan of Care Reviewed With: patient  Overall Patient Progress: no change  Goal: Patient-Specific Goal (Individualized)  Description: You can add care plan individualizations to a care plan. Examples of Individualization might be:  \"Parent requests to be called daily at 9am for status\", \"I have a hard time hearing out of my right ear\", or \"Do not touch me to wake me up as it startles me\".  Outcome: Progressing  Goal: Absence of Hospital-Acquired Illness or Injury  Outcome: Progressing  Intervention: Identify and Manage Fall Risk  Recent Flowsheet Documentation  Taken 8/12/2023 0820 by Chanelle Joe RN  Safety Promotion/Fall Prevention:   activity supervised   lighting adjusted  Intervention: Prevent Skin Injury  Recent Flowsheet Documentation  Taken 8/12/2023 0820 by Chanelle Joe RN  Body Position:   position changed independently   supine, legs elevated   supine, head elevated  Intervention: Prevent and Manage VTE (Venous Thromboembolism) Risk  Recent Flowsheet Documentation  Taken 8/12/2023 0820 by Chanelle Joe RN  VTE Prevention/Management:   patient refused intervention   SCDs (sequential compression devices) off  Goal: Optimal Comfort and Wellbeing  Outcome: Not Progressing  Intervention: Monitor Pain and Promote Comfort  Recent Flowsheet Documentation  Taken 8/12/2023 1405 by Chanelle Joe RN  Pain Management Interventions: medication (see MAR)  Taken 8/12/2023 0908 by Chanelle Joe RN  Pain Management Interventions: medication (see " MAR)  Goal: Readiness for Transition of Care  Outcome: Not Progressing  Flowsheets  Taken 8/12/2023 1434  Anticipated Changes Related to Illness: inability to care for self  Transportation Anticipated: family or friend will provide  Concerns to be Addressed:   mental health   basic needs  Taken 8/12/2023 0902  Transportation Anticipated: family or friend will provide  Intervention: Mutually Develop Transition Plan  Recent Flowsheet Documentation  Taken 8/12/2023 1434 by Chanelle Joe RN  Anticipated Changes Related to Illness: inability to care for self  Transportation Anticipated: family or friend will provide  Concerns to be Addressed:   mental health   basic needs  Taken 8/12/2023 0904 by Chanelle Joe RN  Equipment Currently Used at Home: none  Taken 8/12/2023 0902 by Chanelle Joe RN  Transportation Anticipated: family or friend will provide  Patient/Family Anticipated Services at Transition: none  Patient/Family Anticipates Transition to:   home   home with family   Goal Outcome Evaluation:      Plan of Care Reviewed With: patient    Overall Patient Progress: no changeOverall Patient Progress: no change    Controlling this patient's pain has been very challenging.  She had a decent night taking Oxycodone both scheduled and prn, scheduled Tylenol, and prn IV Dilaudid.  This morning she was calm and seemed to be in good spirits.  She was medicated for pain at least every 1-1.5 hours throughout the day between Oxycodone, Tylenol, and Dilaudid.  She is also taking Baclofen and Gabapentin.  Pain was constant and between 7 at good times to 10/10 at the worst of times.    Alexia with Pain team saw the patient this am when she was doing well and then signed off.  This afternoon the patient was very agitated due to shooting pains from the right ankle to the right knee and hip.  She was throwing items across the room and yelling at staff that we don't care what happens to her and that we must all be in  training and just want to see her suffer.  She said she has never seen care so poor regarding pain control.  She also accused writer of not even giving her any of her scheduled medications that she takes at home.  Writer read through her home medications and explained that she had received all of them with her morning meds.  Pain team was called and Alexia came up to speak to the patient and adjust her scheduled and prn pain medications from Oxycodone to Dilaudid po, increased the Gabapentin, added Atarax and ordered for Ortho to be reconsulted.    After another dose of Oxycodone and Atarax her pain was a more tolerable 8/10 and she apologized for her emotional outbursts.  Ortho called and said they will see her tomorrow after she's had time to adjust to the new medication regimen.

## 2023-08-12 NOTE — PROGRESS NOTES
[facility]    Medicine Progress Note - Hospitalist Service    Date of Admission:  8/10/2023    Assessment & Plan   Everett Dixon is a 63 year old female admitted on 8/10/2023. She presented to the ER after a mechanical fall accident.  Right ankle x-ray with displaced bimalleolar ankle fracture with lateral talar shift and angulation.     Mechanical fall  Right foot pain  Closed right ankle fracture  -Sustained a mechanical fall accident in her niece's house while getting out of bathroom she was tipped and fell.  Right ankle x-ray significant for displaced bimalleolar ankle fracture  -Had a remote history of left ankle fracture in 2003  -[facility] following, did reduction at bedside  -Pain control with dilaudid, tylenol, oxy.  -Pain team consulted.   -NWB in RLE and aggressive elevation  -Despite increase in pain 8/12 x-ray through cast reassuring as there is no change from previous, will be seen by Ortho again in the morning     Active smoker  -Counseling, nicotine patch or gum    Elevated BP with no diagnosis of hypertension  -Manage pain adequately.  -Hydralazine prn ordered.    Mild MARVIN - resolved.    HypoK - replete per protocol       Diet: Regular Diet Adult    DVT Prophylaxis: Pneumatic Compression Devices  [place] Catheter: Not present  Lines: None     Cardiac Monitoring: None  Code Status: Full Code      Clinically Significant Risk Factors        # Hypokalemia: Lowest K = 3.2 mmol/L in last 2 days, will replace as needed                           Disposition Plan     Expected Discharge Date: 08/14/2023    Discharge Delays: PT Disposition recs needed  Placement - TCU  Destination: home;home with family            Alia Fletcher MD  Hospitalist Service  [facility]  Securely message with ABS (more info)  Text page via [employer] Paging/Directory   ______________________________________________________________________    Interval History   Ms. Dixon was having  increased pain today.  I discussed this with the pain team.  X-ray was done which shows no change in fracture.  Ortho will visit her again tomorrow.  Appears that she has to have cast with a so they may be able to undo and check the skin for any ulcerations.  Was given an additional dose of IV Dilaudid 1 mg which helped with pain which helped.  We will continue to monitor her pain.  She also had oxycodone replaced with Dilaudid by the pain team this morning.    Physical Exam   Vital Signs: Temp: 98.1  F (36.7  C) Temp src: Oral BP: (!) 140/81 Pulse: 77   Resp: 16 SpO2: 100 % O2 Device: Nasal cannula Oxygen Delivery: 4 LPM  Weight: 130 lbs 0 oz    General Appearance: Awake, alert, in moderate distress due to pain  Respiratory: CTAB, no wheeze  Cardiovascular: RRR, no murmur noted  GI: soft, nontender, non distended, normal bowel sounds  Skin: no jaundice, no rash      Medical Decision Making       50 MINUTES SPENT BY ME on the date of service doing chart review, history, exam, documentation & further activities per the note.      Data     I have personally reviewed the following data over the past 24 hrs:    N/A  \   N/A   / N/A     N/A N/A N/A /  N/A   4.2 N/A N/A \       Imaging results reviewed over the past 24 hrs:   Recent Results (from the past 24 hour(s))   XR Ankle Port Right 2 Views    Narrative    EXAM: XR ANKLE PORT RIGHT 2 VIEWS  LOCATION: [facility]  DATE: 8/12/2023    INDICATION: Increased pain with bimaleolar fracture after casting.  COMPARISON: 08/10/2023.      Impression    IMPRESSION: Films taken through casting material obscure some bony detail. The fracture of the distal fibula is still visualized without change in alignment. A fracture at the medial malleolus is difficult to appreciate through the splinting material. No   change from the previous exam.

## 2023-08-13 ENCOUNTER — APPOINTMENT (OUTPATIENT)
Dept: PHYSICAL THERAPY | Facility: HOSPITAL | Age: 63
DRG: 493 | End: 2023-08-13
Attending: INTERNAL MEDICINE
Payer: COMMERCIAL

## 2023-08-13 ENCOUNTER — APPOINTMENT (OUTPATIENT)
Dept: OCCUPATIONAL THERAPY | Facility: HOSPITAL | Age: 63
DRG: 493 | End: 2023-08-13
Attending: INTERNAL MEDICINE
Payer: COMMERCIAL

## 2023-08-13 ENCOUNTER — APPOINTMENT (OUTPATIENT)
Dept: CT IMAGING | Facility: HOSPITAL | Age: 63
DRG: 493 | End: 2023-08-13
Attending: STUDENT IN AN ORGANIZED HEALTH CARE EDUCATION/TRAINING PROGRAM
Payer: COMMERCIAL

## 2023-08-13 LAB
MAGNESIUM SERPL-MCNC: 2.2 MG/DL (ref 1.7–2.3)
POTASSIUM SERPL-SCNC: 4 MMOL/L (ref 3.4–5.3)

## 2023-08-13 PROCEDURE — 73700 CT LOWER EXTREMITY W/O DYE: CPT | Mod: RT

## 2023-08-13 PROCEDURE — 250N000013 HC RX MED GY IP 250 OP 250 PS 637: Performed by: PAIN MEDICINE

## 2023-08-13 PROCEDURE — 250N000013 HC RX MED GY IP 250 OP 250 PS 637: Performed by: HOSPITALIST

## 2023-08-13 PROCEDURE — 250N000013 HC RX MED GY IP 250 OP 250 PS 637

## 2023-08-13 PROCEDURE — 97535 SELF CARE MNGMENT TRAINING: CPT | Mod: GO

## 2023-08-13 PROCEDURE — 83735 ASSAY OF MAGNESIUM: CPT | Performed by: INTERNAL MEDICINE

## 2023-08-13 PROCEDURE — 99233 SBSQ HOSP IP/OBS HIGH 50: CPT | Performed by: PAIN MEDICINE

## 2023-08-13 PROCEDURE — 97166 OT EVAL MOD COMPLEX 45 MIN: CPT | Mod: GO

## 2023-08-13 PROCEDURE — 97530 THERAPEUTIC ACTIVITIES: CPT | Mod: GP

## 2023-08-13 PROCEDURE — 250N000013 HC RX MED GY IP 250 OP 250 PS 637: Performed by: STUDENT IN AN ORGANIZED HEALTH CARE EDUCATION/TRAINING PROGRAM

## 2023-08-13 PROCEDURE — 84132 ASSAY OF SERUM POTASSIUM: CPT | Performed by: INTERNAL MEDICINE

## 2023-08-13 PROCEDURE — 250N000011 HC RX IP 250 OP 636: Mod: JZ | Performed by: PAIN MEDICINE

## 2023-08-13 PROCEDURE — 97162 PT EVAL MOD COMPLEX 30 MIN: CPT | Mod: GP

## 2023-08-13 PROCEDURE — 97530 THERAPEUTIC ACTIVITIES: CPT | Mod: GO

## 2023-08-13 PROCEDURE — 99232 SBSQ HOSP IP/OBS MODERATE 35: CPT | Performed by: INTERNAL MEDICINE

## 2023-08-13 PROCEDURE — 120N000001 HC R&B MED SURG/OB

## 2023-08-13 PROCEDURE — 999N000127 HC STATISTIC PERIPHERAL IV START W US GUIDANCE

## 2023-08-13 PROCEDURE — 36415 COLL VENOUS BLD VENIPUNCTURE: CPT | Performed by: INTERNAL MEDICINE

## 2023-08-13 RX ORDER — KETOROLAC TROMETHAMINE 15 MG/ML
15 INJECTION, SOLUTION INTRAMUSCULAR; INTRAVENOUS ONCE
Status: COMPLETED | OUTPATIENT
Start: 2023-08-13 | End: 2023-08-13

## 2023-08-13 RX ORDER — METHOCARBAMOL 500 MG/1
500 TABLET, FILM COATED ORAL EVERY 6 HOURS
Status: DISCONTINUED | OUTPATIENT
Start: 2023-08-13 | End: 2023-08-18 | Stop reason: HOSPADM

## 2023-08-13 RX ORDER — CELECOXIB 200 MG/1
200 CAPSULE ORAL AT BEDTIME
Status: DISCONTINUED | OUTPATIENT
Start: 2023-08-13 | End: 2023-08-14

## 2023-08-13 RX ADMIN — BACLOFEN 10 MG: 10 TABLET ORAL at 20:51

## 2023-08-13 RX ADMIN — DULOXETINE HYDROCHLORIDE 60 MG: 60 CAPSULE, DELAYED RELEASE PELLETS ORAL at 08:18

## 2023-08-13 RX ADMIN — HYDROXYZINE HYDROCHLORIDE 50 MG: 25 TABLET, FILM COATED ORAL at 18:20

## 2023-08-13 RX ADMIN — CALCIUM CARBONATE (ANTACID) CHEW TAB 500 MG 500 MG: 500 CHEW TAB at 02:11

## 2023-08-13 RX ADMIN — NICOTINE 1 PATCH: 14 PATCH, EXTENDED RELEASE TRANSDERMAL at 21:57

## 2023-08-13 RX ADMIN — HYDROXYZINE HYDROCHLORIDE 50 MG: 25 TABLET, FILM COATED ORAL at 06:44

## 2023-08-13 RX ADMIN — GABAPENTIN 700 MG: 100 CAPSULE ORAL at 13:55

## 2023-08-13 RX ADMIN — GABAPENTIN 700 MG: 100 CAPSULE ORAL at 08:18

## 2023-08-13 RX ADMIN — NICOTINE 1 PATCH: 14 PATCH, EXTENDED RELEASE TRANSDERMAL at 00:17

## 2023-08-13 RX ADMIN — SENNOSIDES AND DOCUSATE SODIUM 1 TABLET: 50; 8.6 TABLET ORAL at 20:51

## 2023-08-13 RX ADMIN — HYDROMORPHONE HYDROCHLORIDE 4 MG: 4 TABLET ORAL at 08:18

## 2023-08-13 RX ADMIN — BACLOFEN 10 MG: 10 TABLET ORAL at 08:18

## 2023-08-13 RX ADMIN — Medication 25 MCG: at 08:18

## 2023-08-13 RX ADMIN — HYDROMORPHONE HYDROCHLORIDE 4 MG: 4 TABLET ORAL at 11:55

## 2023-08-13 RX ADMIN — HYDROMORPHONE HYDROCHLORIDE 4 MG: 4 TABLET ORAL at 21:39

## 2023-08-13 RX ADMIN — BETAMETHASONE DIPROPIONATE: 0.5 LOTION TOPICAL at 20:51

## 2023-08-13 RX ADMIN — BISACODYL 10 MG: 10 SUPPOSITORY RECTAL at 09:10

## 2023-08-13 RX ADMIN — GABAPENTIN 700 MG: 100 CAPSULE ORAL at 20:50

## 2023-08-13 RX ADMIN — POLYETHYLENE GLYCOL 3350 17 G: 17 POWDER, FOR SOLUTION ORAL at 08:19

## 2023-08-13 RX ADMIN — HYDROMORPHONE HYDROCHLORIDE 0.4 MG: 0.2 INJECTION, SOLUTION INTRAMUSCULAR; INTRAVENOUS; SUBCUTANEOUS at 02:19

## 2023-08-13 RX ADMIN — ZOLPIDEM TARTRATE 10 MG: 5 TABLET ORAL at 21:47

## 2023-08-13 RX ADMIN — BETAMETHASONE DIPROPIONATE: 0.5 LOTION TOPICAL at 08:19

## 2023-08-13 RX ADMIN — DULOXETINE HYDROCHLORIDE 60 MG: 60 CAPSULE, DELAYED RELEASE PELLETS ORAL at 20:51

## 2023-08-13 RX ADMIN — METHOCARBAMOL 500 MG: 500 TABLET, FILM COATED ORAL at 21:39

## 2023-08-13 RX ADMIN — BUSPIRONE HYDROCHLORIDE 15 MG: 15 TABLET ORAL at 08:18

## 2023-08-13 RX ADMIN — BUSPIRONE HYDROCHLORIDE 15 MG: 15 TABLET ORAL at 20:51

## 2023-08-13 RX ADMIN — HYDROMORPHONE HYDROCHLORIDE 4 MG: 4 TABLET ORAL at 18:20

## 2023-08-13 RX ADMIN — HYDROMORPHONE HYDROCHLORIDE 4 MG: 4 TABLET ORAL at 10:14

## 2023-08-13 RX ADMIN — KETOROLAC TROMETHAMINE 15 MG: 15 INJECTION INTRAMUSCULAR; INTRAVENOUS at 09:26

## 2023-08-13 RX ADMIN — ACETAMINOPHEN 975 MG: 325 TABLET ORAL at 11:57

## 2023-08-13 RX ADMIN — HYDROMORPHONE HYDROCHLORIDE 4 MG: 4 TABLET ORAL at 16:16

## 2023-08-13 RX ADMIN — METHOCARBAMOL 500 MG: 500 TABLET, FILM COATED ORAL at 16:16

## 2023-08-13 RX ADMIN — HYDROXYZINE HYDROCHLORIDE 50 MG: 25 TABLET, FILM COATED ORAL at 14:47

## 2023-08-13 RX ADMIN — HYDROMORPHONE HYDROCHLORIDE 4 MG: 4 TABLET ORAL at 14:47

## 2023-08-13 RX ADMIN — FLUTICASONE FUROATE AND VILANTEROL TRIFENATATE 1 PUFF: 100; 25 POWDER RESPIRATORY (INHALATION) at 08:18

## 2023-08-13 RX ADMIN — HYDROMORPHONE HYDROCHLORIDE 4 MG: 4 TABLET ORAL at 05:00

## 2023-08-13 RX ADMIN — METHOCARBAMOL 500 MG: 500 TABLET, FILM COATED ORAL at 10:14

## 2023-08-13 RX ADMIN — HYDROMORPHONE HYDROCHLORIDE 4 MG: 4 TABLET ORAL at 00:07

## 2023-08-13 RX ADMIN — SENNOSIDES AND DOCUSATE SODIUM 1 TABLET: 50; 8.6 TABLET ORAL at 08:18

## 2023-08-13 RX ADMIN — ATORVASTATIN CALCIUM 20 MG: 10 TABLET, FILM COATED ORAL at 08:18

## 2023-08-13 RX ADMIN — ACETAMINOPHEN 975 MG: 325 TABLET ORAL at 17:40

## 2023-08-13 RX ADMIN — ACETAMINOPHEN 975 MG: 325 TABLET ORAL at 06:44

## 2023-08-13 RX ADMIN — Medication 250 MG: at 08:18

## 2023-08-13 RX ADMIN — BACLOFEN 10 MG: 10 TABLET ORAL at 13:55

## 2023-08-13 RX ADMIN — ACETAMINOPHEN 975 MG: 325 TABLET ORAL at 00:07

## 2023-08-13 RX ADMIN — CELECOXIB 200 MG: 200 CAPSULE ORAL at 20:51

## 2023-08-13 ASSESSMENT — ACTIVITIES OF DAILY LIVING (ADL)
ADLS_ACUITY_SCORE: 32
PREVIOUS_RESPONSIBILITIES: MEAL PREP;HOUSEKEEPING;LAUNDRY;SHOPPING;MEDICATION MANAGEMENT;FINANCES;DRIVING
ADLS_ACUITY_SCORE: 32
ADLS_ACUITY_SCORE: 32

## 2023-08-13 NOTE — PLAN OF CARE
"  Problem: Plan of Care - These are the overarching goals to be used throughout the patient stay.    Goal: Plan of Care Review  Description: The Plan of Care Review/Shift note should be completed every shift.  The Outcome Evaluation is a brief statement about your assessment that the patient is improving, declining, or no change.  This information will be displayed automatically on your shift note.  Outcome: Progressing  Flowsheets (Taken 8/12/2023 2207)  Plan of Care Reviewed With: patient  Goal: Patient-Specific Goal (Individualized)  Description: You can add care plan individualizations to a care plan. Examples of Individualization might be:  \"Parent requests to be called daily at 9am for status\", \"I have a hard time hearing out of my right ear\", or \"Do not touch me to wake me up as it startles me\".  Outcome: Progressing  Goal: Absence of Hospital-Acquired Illness or Injury  Outcome: Progressing  Intervention: Identify and Manage Fall Risk  Recent Flowsheet Documentation  Taken 8/12/2023 1600 by Mohinder Greene RN  Safety Promotion/Fall Prevention:   activity supervised   lighting adjusted  Intervention: Prevent and Manage VTE (Venous Thromboembolism) Risk  Recent Flowsheet Documentation  Taken 8/12/2023 1600 by Mohinder Greene RN  VTE Prevention/Management:   patient refused intervention   SCDs (sequential compression devices) off  Goal: Optimal Comfort and Wellbeing  Outcome: Progressing  Intervention: Monitor Pain and Promote Comfort  Recent Flowsheet Documentation  Taken 8/12/2023 1633 by Mohinder Greene RN  Pain Management Interventions: medication (see MAR)  Taken 8/12/2023 1603 by Mohinder Greene RN  Pain Management Interventions: medication (see MAR)  Taken 8/12/2023 1554 by Mohinder Greene RN  Pain Management Interventions: medication (see MAR)  Goal: Readiness for Transition of Care  Outcome: Progressing     Problem: Pain Acute  Goal: Optimal Pain Control and Function  Outcome: " Progressing  Intervention: Develop Pain Management Plan  Recent Flowsheet Documentation  Taken 8/12/2023 1633 by Mohinder Greene RN  Pain Management Interventions: medication (see MAR)  Taken 8/12/2023 1603 by Mohinder Greene RN  Pain Management Interventions: medication (see MAR)  Taken 8/12/2023 1554 by Mohinder Greene RN  Pain Management Interventions: medication (see MAR)  Intervention: Prevent or Manage Pain  Recent Flowsheet Documentation  Taken 8/12/2023 1600 by Mohinder Greene RN  Medication Review/Management: medications reviewed    Goal Outcome Evaluation:      Plan of Care Reviewed With: patient    Pt is A/O x 4, frustrated at times regarding pain control regimen & overall POC. Vitally stable & saturating well on RA. Pt has endorsed shooting pains of the RLE rated 7-10. Pain was initially poorly controlled with scheduled & PRN meds. Hospitalist came to see pt and ordered one time dose of 1 mg IV dilaudid to treat breakthrough pain. Pt tolerated this well & endorsed a great improvement in pain to 5/10 - pain is now more well-managed using scheduled dilaudid as well. Pt is becoming constipated - still no BM this shift. Will continue to monitor.     Mohinder Greene RN on 8/12/2023 at 10:37 PM

## 2023-08-13 NOTE — PLAN OF CARE
"Goal Outcome Evaluation:       Patient continued to require pain meds every few hours through out this shift. Consistently rates her pain at \"8\" Medicated with PRN and scheduled pain meds. Encouraged ice packs and elevation of RLE.  Drinking fluids and voiding large amounts.               Problem: Pain Acute  Goal: Optimal Pain Control and Function  Outcome: Progressing  Intervention: Prevent or Manage Pain  Recent Flowsheet Documentation  Taken 8/13/2023 0001 by Ihsan Gates RN  Medication Review/Management: medications reviewed       "

## 2023-08-13 NOTE — PROGRESS NOTES
Ortho Progress Note      Subjective:  Patient with right ankle fracture s/p closed reduction with my partner Dr. Ferraro Thursday. Continued pain in right ankle. She reports pain is better controlled with current regimen. Denies numbness or tingling in her foot/toes. Splint seems to be fitting well.     Objective:  Vital signs in last 24 hours  Temp:  [98  F (36.7  C)-99.5  F (37.5  C)] 99.5  F (37.5  C)  Pulse:  [73] 73  Resp:  [16-17] 16  BP: (136-155)/(84-87) 136/84  SpO2:  [94 %-100 %] 94 %  General: resting in bed, alert and oriented, no distress  Resp: breathing nonlabored  RLE: Short leg splint in place, appears to be fitting well. Fires EHL/FHL. SILT DP/SP/tib. Compartments soft and compressible, no pain with passive stretch. Toes wwp with BCR, DP palpable under splint.      Pertinent Labs   Lab Results: personally reviewed.   Recent Labs   Lab 08/10/23  0312 08/10/23  0245   INR 0.96 1.00     Recent Labs   Lab 08/10/23  0312   HGB 10.9*     No results for input(s): CREATININE in the last 168 hours.    Imaging: CT right ankle from today reveals trimalleolar fracture. There has been slight lateral displacement of the talus compared to the initial post reduction films, but overall alignment is appropriate.     Assessment: 63 year old female, smoker, with right trimalleolar ankle fracture dislocation.         Plan:   -continue pain control regimen, appreciate pain team recs  -splint to remain c/d/I  -NWB RLE  -elevate to level of heart at all times  -given patient has not discharged, will explore options for surgical fixation Tuesday morning vs. Afternoon, will update plan once surgery time secured    Vicente George MD  [facility]

## 2023-08-13 NOTE — PLAN OF CARE
"  Problem: Plan of Care - These are the overarching goals to be used throughout the patient stay.    Goal: Plan of Care Review  Description: The Plan of Care Review/Shift note should be completed every shift.  The Outcome Evaluation is a brief statement about your assessment that the patient is improving, declining, or no change.  This information will be displayed automatically on your shift note.  Outcome: Progressing  Flowsheets (Taken 8/13/2023 1255)  Plan of Care Reviewed With: patient  Goal: Patient-Specific Goal (Individualized)  Description: You can add care plan individualizations to a care plan. Examples of Individualization might be:  \"Parent requests to be called daily at 9am for status\", \"I have a hard time hearing out of my right ear\", or \"Do not touch me to wake me up as it startles me\".  Outcome: Progressing  Goal: Absence of Hospital-Acquired Illness or Injury  Outcome: Progressing  Intervention: Identify and Manage Fall Risk  Recent Flowsheet Documentation  Taken 8/13/2023 0820 by Chanelle Joe RN  Safety Promotion/Fall Prevention:   activity supervised   lighting adjusted  Intervention: Prevent Skin Injury  Recent Flowsheet Documentation  Taken 8/13/2023 0820 by Chanelle Joe RN  Body Position: position changed independently  Intervention: Prevent and Manage VTE (Venous Thromboembolism) Risk  Recent Flowsheet Documentation  Taken 8/13/2023 0820 by Chanelle Joe RN  VTE Prevention/Management:   patient refused intervention   SCDs (sequential compression devices) off  Goal: Optimal Comfort and Wellbeing  Outcome: Progressing  Intervention: Monitor Pain and Promote Comfort  Recent Flowsheet Documentation  Taken 8/13/2023 1157 by Chanelle Joe RN  Pain Management Interventions: medication (see MAR)  Goal: Readiness for Transition of Care  Outcome: Progressing   Goal Outcome Evaluation:      Plan of Care Reviewed With: patient    Overall Patient Progress: no changeOverall Patient " Progress: no change    Patient was seen by Alexia from pain team again this morning.  She added in scheduled Robaxin and IV Toradol.  She appears to be and verbalizes that the pain in her right ankle is much more tolerable today.  She is consistently taking the scheduled and prn PO Dilaudid.      Patient also mentioned that she still has not had a BM since Thursday morning but that she does not feel uncomfortable.  Writer and patient discussed a suppository and she agreed to try that.  She was given a suppository but has not had results yet.  She is also taking scheduled Miralax and Senna.      Everett started working with PT/OT today and was able to get up into the chair.  She also had a CT scan of her right ankle which just showed a displaced fracture.  Ortho came to see her when she was on the way to CT and have not returned yet.

## 2023-08-13 NOTE — PLAN OF CARE
Problem: Pain Acute  Goal: Optimal Pain Control and Function  Outcome: Progressing     Problem: Pain Acute  Goal: Optimal Pain Control and Function  Intervention: Prevent or Manage Pain  Recent Flowsheet Documentation  Taken 8/13/2023 1600 by Joey Fitch RN  Bowel Elimination Promotion: adequate fluid intake promoted  Medication Review/Management: medications reviewed   Goal Outcome Evaluation:     Major Shift Events:  Continues pain to right foot rates pain 7/10. Per patient, pain has improved from yesterday. PRN dilaudid, scheduled dilaudid, and tylenol given with improvement to pain. Pt had loose stool x1.     Plan: Surgical fixation on Tues morning or afternoon.    For vital signs and complete assessments, please see documentation flowsheets.

## 2023-08-13 NOTE — PROGRESS NOTES
Physical Therapy        08/13/23 1100   Appointment Info   Signing Clinician's Name / Credentials (PT) Regine Kaur DPT   Rehab Comments (PT) pt pleasant and cooperative. Follows instructions. PT concluded with pt resting in chair, call light in hand and chair alarm on.   Living Environment   People in Home alone   Current Living Arrangements apartment   Home Accessibility no concerns   Living Environment Comments independent with all ADLs/IADLs/driving   Self-Care   Activity/Exercise/Self-Care Comment Uses no AD, but owns FWW and crutches   General Information   Onset of Illness/Injury or Date of Surgery 08/10/23   Referring Physician Alia Fletcher MD   Patient/Family Therapy Goals Statement (PT) none stated   Pertinent History of Current Problem (include personal factors and/or comorbidities that impact the POC) R bimalleolar fracture s/p closed reduction and splinting   Existing Precautions/Restrictions weight bearing   Weight-Bearing Status - RLE nonweight-bearing   Cognition   Affect/Mental Status (Cognition) WFL   Pain Assessment   Patient Currently in Pain Yes, see Vital Sign flowsheet   Posture    Posture Not impaired   Range of Motion (ROM)   Range of Motion ROM deficits secondary to pain;ROM deficits secondary to swelling   Strength (Manual Muscle Testing)   Strength (Manual Muscle Testing) strength is WNL   Strength Comments except R LE 2/2 fracture   Bed Mobility   Comment, (Bed Mobility) Sahra supine to sit with rail, assistance to move RLE with  heavy splint   Transfers   Comment, (Transfers) SBA to lift hips for seated scoot. Pt able to clear hips off bed   Gait/Stairs (Locomotion)   Comment, (Gait/Stairs) 3 steps with FWW, maintained NWB with Sahra   Balance   Balance Comments able to maintain NWB with FWW   Sensory Examination   Sensory Perception Comments minimal tingling in right toes, able to wiggle them and feel light touch   Coordination   Coordination no deficits were identified   Muscle  Tone   Muscle Tone no deficits were identified   Clinical Impression   Criteria for Skilled Therapeutic Intervention Yes, treatment indicated   PT Diagnosis (PT) gait instability   Influenced by the following impairments WB restriction, pain   Functional limitations due to impairments limited household mobility   Clinical Presentation (PT Evaluation Complexity) Stable/Uncomplicated   Clinical Presentation Rationale presents as medically diagnosed   Clinical Decision Making (Complexity) moderate complexity   Planned Therapy Interventions (PT) balance training;bed mobility training;gait training;home exercise program;neuromuscular re-education;patient/family education;stair training;strengthening;transfer training;wheelchair management/propulsion training;progressive activity/exercise   Anticipated Equipment Needs at Discharge (PT) wheelchair;walker, rolling   Risk & Benefits of therapy have been explained evaluation/treatment results reviewed;care plan/treatment goals reviewed;participants voiced agreement with care plan;participants included;patient   PT Total Evaluation Time   PT Eval, Moderate Complexity Minutes (61677) 13   Physical Therapy Goals   PT Frequency Daily   Interventions   Interventions Quick Adds Therapeutic Activity   Therapeutic Activity   Therapeutic Activities: dynamic activities to improve functional performance Minutes (73359) 8   Treatment Detail/Skilled Intervention Sitting EOB with SBA, pt ed on NWB rationale and technique. PT demo with FWW. CGA sit to stand, Sahra stand to sit.   PT Discharge Planning   PT Plan GT short distances with FWW, transfers, TE   PT Discharge Recommendation (DC Rec) Transitional Care Facility   PT Rationale for DC Rec assist of 1 with mobility and cares, lives alone   PT Brief overview of current status able to take a few steps from bed to chair with Sahra to maintain NWB with FWW   Total Session Time   Timed Code Treatment Minutes 8   Total Session Time (sum of  timed and untimed services) 21         Pt was educated on the role of physical therapy in their care, and indicated understanding.      Regine Kaur, DPT 8/13/2023

## 2023-08-13 NOTE — PROGRESS NOTES
[facility] ACUTE PAIN SERVICE    ([facility], [facility], [facility])  Daily PAIN Progress Note    Assessment/Plan:  Everett Dixon is a 63 year old female who was admitted on 8/10/2023.  I was asked to see the patient for fracture pain, after fall with displaced bimalleolar ankle fracture with lateral talar shift and angulation - pt underwent closed reduction and splinting. Admitted for fall. History of smoking, deppression, chronic gabapentin and baclofen use, anxiety, emphysema.     showsongoing use of gabapentin. Describes pain as somewhat improved, still severe. Only able to sleep for 10 min at a time due to pain and spasms.        PLAN:  Acute pain secondary to fracture of ankle. Opioid naive. Agree with scheduled oxycodone, would minimize IV dilaudid.   Multimodal Medication Therapy:   Topical: bandage over site  NSAID'S: Ortho recommended celecoxib - 200 mg - change to HS only and give one dose of toradol now (use with caution)  Muscle Relaxants: baclofen 10 mg po TID & add robaxin Q6h alternating   Adjuvants: APAP 975 mg q6h, gabapentin 600 mg po TID  Antidepressants/anxiolytics: buspar 15 mg BID  Opioids: continue Dilaudid 4 mg every 6 scheduled and 4 mg every 3 as needed  IV Pain medication: decreased IV to q6h prn   Constipation management: No BM since admission.  Will schedule MiraLAX and continue senna  Discharge plan- Dilaudid per ortho. Suggest DXA scan.          Subjective:      Today I met with the patient at the bedside.  She states her pain is somewhat improved on Dilaudid however still severe.  She winces throughout the interview.  She states her entire leg hurts.  She has been icing.  Very much open to acupuncture.  Only able to sleep for about 10 minutes at a time before waking up with severe spasms.  She is attempting to elevate the extremity.        Closed bimalleolar fracture of right ankle, initial encounter   Patient Active Problem List   Diagnosis    Closed bimalleolar  "fracture of right ankle, initial encounter    Closed right ankle fracture        History   Drug Use Not on file         Tobacco Use      Smoking status: Every Day        Packs/day: 0.50        Years: 40.00        Pack years: 20        Types: Cigarettes      Smokeless tobacco: Never      Tobacco comments: Seen IP by CTTS on 8/11/23- contemplating quitting in the future         acetaminophen  975 mg Oral Q6H    atorvastatin  20 mg Oral Daily    baclofen  10 mg Oral TID    betamethasone dipropionate   Topical BID    busPIRone  15 mg Oral BID    celecoxib  200 mg Oral At Bedtime    DULoxetine  60 mg Oral BID    fluticasone-vilanterol  1 puff Inhalation Daily    gabapentin  700 mg Oral TID    HYDROmorphone  4 mg Oral Q6H    ketorolac  15 mg Intravenous Once    Magnesium Oxide  250 mg Oral Daily    methocarbamol  500 mg Oral Q6H    nicotine  1 patch Transdermal Daily    nicotine   Transdermal Q8H    polyethylene glycol  17 g Oral Daily    senna-docusate  1 tablet Oral BID    sodium chloride (PF)  3 mL Intracatheter Q8H    Vitamin D3  25 mcg Oral Daily       Objective:  Vital signs in last 24 hours:  B/P: 136/84, T: 99.5, P: 73, R: 16   Blood pressure 136/84, pulse 73, temperature 99.5  F (37.5  C), temperature source Oral, resp. rate 16, height 1.6 m (5' 3\"), weight 59 kg (130 lb), SpO2 94 %.      Weight:   Wt Readings from Last 2 Encounters:   08/10/23 59 kg (130 lb)           Intake/Output:    Intake/Output Summary (Last 24 hours) at 8/13/2023 0737  Last data filed at 8/13/2023 0646  Gross per 24 hour   Intake 1500 ml   Output 600 ml   Net 900 ml        Review of Systems:   As per subjective, all others negative.    Physical Exam:     General Appearance:  Alert, cooperative, mild distress   Head:  Normocephalic, without obvious abnormality, atraumatic   Eyes:  PERRL, conjunctiva/corneas clear, EOM's intact   ENT/Throat: Lips, dry   Lymph/Neck: Supple, symmetrical, trachea midline, no adenopathy, thyroid: not enlarged, " symmetric    Lungs:   Cough, respirations unlabored   Chest Wall:  No tenderness or deformity   Cardiovascular/Heart:  No shortness of breath   Abdomen:   Soft, non-tender, bowel sounds active all four quadrants,  no masses, no organomegaly   Musculoskeletal: Extremity elevated and in a cast, toes are pink and warm and she is able to move the toes   Skin: Skin is tanned   Neurologic: Alert and oriented X 3, Moves all 4 extremities including the toes of the wrapped splinted extremity          Imaging:  Personally Reviewed.    Results for orders placed or performed during the hospital encounter of 08/10/23   XR Tibia and Fibula Right 2 Views    Impression    IMPRESSION: The bones are well-mineralized. The bony pelvis is intact. The femoral head and neck are intact. The hip joint is well aligned. The femur is intact. The included portions of the knee joint are well aligned. Overlying splinting material limits   evaluation of the ankle, there is a displaced bimalleolar ankle fracture with lateral talar shift and angulation. Soft tissue swelling surrounds the ankle. The remainder of the tibia and fibula are intact.   Ankle XR, G/E 3 views, right    Impression    IMPRESSION: The bones are well-mineralized. The bony pelvis is intact. The femoral head and neck are intact. The hip joint is well aligned. The femur is intact. The included portions of the knee joint are well aligned. Overlying splinting material limits   evaluation of the ankle, there is a displaced bimalleolar ankle fracture with lateral talar shift and angulation. Soft tissue swelling surrounds the ankle. The remainder of the tibia and fibula are intact.   XR Pelvis and Hip Right 2 Views    Impression    IMPRESSION: The bones are well-mineralized. The bony pelvis is intact. The femoral head and neck are intact. The hip joint is well aligned. The femur is intact. The included portions of the knee joint are well aligned. Overlying splinting material limits    evaluation of the ankle, there is a displaced bimalleolar ankle fracture with lateral talar shift and angulation. Soft tissue swelling surrounds the ankle. The remainder of the tibia and fibula are intact.   XR Femur Right 2 Views    Impression    IMPRESSION: The bones are well-mineralized. The bony pelvis is intact. The femoral head and neck are intact. The hip joint is well aligned. The femur is intact. The included portions of the knee joint are well aligned. Overlying splinting material limits   evaluation of the ankle, there is a displaced bimalleolar ankle fracture with lateral talar shift and angulation. Soft tissue swelling surrounds the ankle. The remainder of the tibia and fibula are intact.   XR Chest 1 View    Impression    IMPRESSION: Right paratracheal density which could represent vascular structure, however adenopathy not excluded. Mild low lung volumes accentuate heart size and pulmonary vascularity. Heart size and pulmonary vascularity within normal limits. No focal   lung infiltrates. No pneumothorax seen. Osseous structures grossly intact. Visualized upper abdomen unremarkable.   Ankle XR, G/E 3 views, right    Impression    IMPRESSION: Closed reduction and splint replacement since the study from earlier today. There is improved position and alignment of the distal tibia and fibula fractures.           XR Ankle Port Right 2 Views    Impression    IMPRESSION: Films taken through casting material obscure some bony detail. The fracture of the distal fibula is still visualized without change in alignment. A fracture at the medial malleolus is difficult to appreciate through the splinting material. No   change from the previous exam.        Lab Results:  Personally Reviewed.   Last Comprehensive Metabolic Panel:  Sodium   Date Value Ref Range Status   08/11/2023 138 136 - 145 mmol/L Final     Potassium   Date Value Ref Range Status   08/13/2023 4.0 3.4 - 5.3 mmol/L Final     Chloride   Date Value Ref  Range Status   08/11/2023 105 98 - 107 mmol/L Final     Carbon Dioxide (CO2)   Date Value Ref Range Status   08/11/2023 21 (L) 22 - 29 mmol/L Final     Anion Gap   Date Value Ref Range Status   08/11/2023 12 7 - 15 mmol/L Final     Glucose   Date Value Ref Range Status   08/11/2023 88 70 - 99 mg/dL Final     Urea Nitrogen   Date Value Ref Range Status   08/11/2023 9.1 8.0 - 23.0 mg/dL Final     Creatinine   Date Value Ref Range Status   08/11/2023 0.62 0.51 - 0.95 mg/dL Final     GFR Estimate   Date Value Ref Range Status   08/11/2023 >90 >60 mL/min/1.73m2 Final     Calcium   Date Value Ref Range Status   08/11/2023 9.6 8.8 - 10.2 mg/dL Final        UA:   Amphetamines Urine   Date Value Ref Range Status   08/10/2023 Screen Negative Screen Negative Final     Comment:     Cutoff for a negative amphetamine is less than 500 ng/mL.     Barbituates Urine   Date Value Ref Range Status   08/10/2023 Screen Negative Screen Negative Final     Comment:     Cutoff for a negative barbiturate is less than 200 ng/mL.     Cannabinoids Urine   Date Value Ref Range Status   08/10/2023 Screen Positive (A) Screen Negative Final     Comment:     Cutoff for a positive cannabinoid is 50 ng/mL or greater.   This is an unconfirmed screening result to be used for medical purposes only.     Cocaine Urine   Date Value Ref Range Status   08/10/2023 Screen Negative Screen Negative Final     Comment:     Cutoff for a negative cocaine is less than 300 ng/mL.     Opiates Urine   Date Value Ref Range Status   08/10/2023 Screen Negative Screen Negative Final     Comment:     Cutoff for a negative opiate is less than 300 ng/mL.     PCP Urine   Date Value Ref Range Status   08/10/2023 Screen Negative Screen Negative Final     Comment:     Cutoff for a negative PCP is less than 25 ng/mL.              Please see A&P for additional details of medical decision making.  MANAGEMENT DISCUSSED with the following over the past 24 hours: Discussed yesterday  evening with Dr. Fletcher   NOTE(S)/MEDICAL RECORDS REVIEWED over the past 24 hours: Reviewed notes from nursing as well as medical team  Tests personally interpreted in the past 24 hours:  - X-ray showing good alignment  Tests REVIEWED in the past 24 hours:  - Estimated Creatinine Clearance: 88.8 mL/min (based on SCr of 0.62 mg/dL).  SUPPLEMENTAL HISTORY, in addition to the patient's history, over the past 24 hours obtained from:   - None  Medical complexity over the past 24 hours:  -------------------------- HIGH RISK FOR MORBIDITY -------------------------------------------------------------  - Parenteral (IV) CONTROLLED SUBSTANCES ordered        Alexia Panchal APRN, CNS-BC, CNP, ACHPN  Acute Care Pain Management Program   Hours of pain coverage 7a-1700- after 1700 please call the house officer    [employer] ([place]bin WHEELER, [place], [place], [place])   Page via [employer]- Click HERE to page Alexia or Jia text web console

## 2023-08-13 NOTE — PROGRESS NOTES
[facility]    Medicine Progress Note - Hospitalist Service    Date of Admission:  8/10/2023    Assessment & Plan   Everett Dixon is a 63 year old female admitted on 8/10/2023. She presented to the ER after a mechanical fall accident.  Right ankle x-ray with displaced bimalleolar ankle fracture with lateral talar shift and angulation.     Mechanical fall  Right foot pain  Closed right ankle fracture and dislocation  -Sustained a mechanical fall accident in her niece's house while getting out of bathroom she was tipped and fell.  Right ankle x-ray significant for displaced bimalleolar ankle fracture  -Had a remote history of left ankle fracture in 2003  -CT 8/13: Acute comminuted fracture of the lateral malleolus, acute moderately displaced transverse fracture of the medial malleolus, small acute mildly displaced fracture of the lateral portion of the posterior malleolus, small acute mildly displaced fracture of the anterior lateral corner of the distal tibia, mild lateral subluxation of the talus with mild widening of the ankle mortise  -Pain team consulted, appreciate recommendations: Given a dose of Toradol today and will start celecoxib 200 mg at bedtime, continue baclofen, gabapentin, BuSpar, adding Robaxin, continue oral Dilaudid, and decrease IV Dilaudid to every 6 hours as needed, starting bowel regimen  -NWB in RLE and aggressive elevation  -[facility] consulted     Active smoker  Cannabis use  -UDS positive for cannabinoids on admission  -Counseling, nicotine patch or gum    Elevated BP with no diagnosis of hypertension  -Manage pain adequately.  -Hydralazine prn ordered.    Mild MARVIN - resolved    HypoK - replete per protocol       Diet: Regular Diet Adult    DVT Prophylaxis: Pneumatic Compression Devices  [place] Catheter: Not present  Lines: None     Cardiac Monitoring: None  Code Status: Full Code      Clinically Significant Risk Factors                                  Disposition  Plan     Expected Discharge Date: 08/14/2023    Discharge Delays: PT Disposition recs needed  Placement - TCU  Destination: home;home with family            Alia Fletcher MD  Hospitalist Service  [facility]  Securely message with [employer] (more info)  Text page via [employer] Paging/Directory   ______________________________________________________________________    Interval History   Ms. Dixon is feeling better today but still having significant pain.  Pain is not anywhere near as severe as it was yesterday.  Was reassessed by Ortho today due to increased pain yesterday.  They are going to explore surgical options for Tuesday.    Physical Exam   Vital Signs: Temp: 99.5  F (37.5  C) Temp src: Oral BP: 136/84 Pulse: 73   Resp: 16 SpO2: 94 % O2 Device: Nasal cannula Oxygen Delivery: 1.5 LPM  Weight: 130 lbs 0 oz    General Appearance: Awake, alert, in no acute distress  Respiratory: CTAB, no wheeze  Cardiovascular: RRR, no murmur noted  GI: soft, nontender, non distended, normal bowel sounds  Skin: no jaundice, no rash      Medical Decision Making       40 MINUTES SPENT BY ME on the date of service doing chart review, history, exam, documentation & further activities per the note.      Data     I have personally reviewed the following data over the past 24 hrs:    N/A  \   N/A   / N/A     N/A N/A N/A /  N/A   4.0 N/A N/A \       Imaging results reviewed over the past 24 hrs:   Recent Results (from the past 24 hour(s))   CT Ankle Right w/o Contrast    Narrative    EXAM: CT ANKLE RIGHT WITHOUT CONTRAST  LOCATION: [facility]  DATE: 08/13/2023    INDICATION: Right ankle pain. Recent fracture.  COMPARISON: 08/02/2023 radiographs.  TECHNIQUE: Noncontrast. Axial, sagittal and coronal thin-section reconstruction. Dose reduction techniques were used.     FINDINGS:     BONES:  -Acute comminuted mildly displaced fracture of the lateral malleolus with mild lateral displacement and  mild lateral angulation of the fracture fragments. Tiny chronic avulsion fracture of the tip of the lateral malleolus. Acute moderately displaced   fracture of the medial malleolus. Tiny acute fracture of the lateral portion of the posterior malleolus. Small mildly displaced acute fracture of the anterolateral corner of the distal tibia as seen on series 3 image 38. There is mild lateral subluxation   of the talus with slight widening of the medial ankle mortise. The subtalar joints are intact and unremarkable. Tiny intra-articular fracture fragment in the tibiotalar joint.    SOFT TISSUES:  -No muscle atrophy. Moderate soft tissue stranding around the ankle likely secondary to poorly defined blood products.      Impression    IMPRESSION:  1.  Acute comminuted fracture of the lateral malleolus.    2.  Acute moderately displaced transverse fracture of the medial malleolus.    3.  Small acute mildly displaced fracture of the lateral portion of the posterior malleolus.    4.  Small acute mildly displaced fracture of the anterolateral corner of the distal tibia.    5.  Mild lateral subluxation of the talus with mild widening of the medial ankle mortise.

## 2023-08-13 NOTE — PROGRESS NOTES
08/13/23 1401   Appointment Info   Signing Clinician's Name / Credentials (OT) Jaja Hernandez, OTR/L   Rehab Comments (OT) TOMAS CA, upcoming surgery?   Living Environment   People in Home alone   Current Living Arrangements apartment   Home Accessibility no concerns   Transportation Anticipated family or friend will provide   Living Environment Comments Pt has a walk in shower, GB by shower. Standard height toilet. Built in shower seat. Pt has laundry is on her floor, nearby but not in her unit --  uses a cart to transport laundry.   Self-Care   Usual Activity Tolerance good   Current Activity Tolerance fair   Equipment Currently Used at Home none   Fall history within last six months yes   Number of times patient has fallen within last six months 1   Activity/Exercise/Self-Care Comment Pt not currently working, on SSDI. Owns FWW, crutches, and offloading boot but does not use any AD typically. Reports sister lives across the street and may be able to help on d/c, boyfriend also lives 10 minutes away and can help with some things -- just recently had a surgery, however. Pt was totally I with ADLs/IADLs PTA and helping others in her apartment complex.   Instrumental Activities of Daily Living (IADL)   Previous Responsibilities meal prep;housekeeping;laundry;shopping;medication management;finances;driving   General Information   Onset of Illness/Injury or Date of Surgery 08/10/23   Referring Physician Alia Fletcher MD   Patient/Family Therapy Goal Statement (OT) To get better   Additional Occupational Profile Info/Pertinent History of Current Problem Everett Dixon is a 63 year old female admitted on 8/10/2023. She presented to the ER after a mechanical fall accident.  Right ankle x-ray with displaced bimalleolar ankle fracture with lateral talar shift and angulation.   Existing Precautions/Restrictions fall;weight bearing;oxygen therapy device and L/min  (2L O2 via NC)   Right Lower Extremity (Weight-bearing  Status) non weight-bearing (NWB)   General Observations and Info Cast to R LE   Cognitive Status Examination   Orientation Status orientation to person, place and time   Cognitive Status Comments Pt denies feeling confused, pt is oriented and cognition appears grossly intact at this time.   Visual Perception   Impact of Vision Impairment on Function (Vision) Pt typically wears glasses but does not have them with her.   Sensory   Sensory Comments Pt reporting n/t in R thigh and knee, traveling pinprick sensation in R heel   Pain Assessment   Patient Currently in Pain Yes, see Vital Sign flowsheet   Range of Motion Comprehensive   Comment, General Range of Motion B UE WNL   Strength Comprehensive (MMT)   Comment, General Manual Muscle Testing (MMT) Assessment B UE WNL   Coordination   Upper Extremity Coordination No deficits were identified   Bed Mobility   Comment (Bed Mobility) Min A for moving R LE towards EOB. HOB elevated ~40 degrees.   Transfers   Transfer Comments STS from EOB with FWW, bed raised slightly, and CGA. Pivot with FWW to recliner CGA while maintaining NWB status of R LE.   Bathing Assessment/Intervention   Comment, (Bathing) A x 1-2 per clinical reasoning   Lower Body Dressing Assessment/Training   Comment, (Lower Body Dressing) likely heavy A x 1 per clinical reasoning   Toileting   Comment, (Toileting) A x 1-2 per clinical reasoning   Clinical Impression   Criteria for Skilled Therapeutic Interventions Met (OT) Yes, treatment indicated   OT Diagnosis Decreased ADL I   OT Problem List-Impairments impacting ADL problems related to;activity tolerance impaired;balance;mobility;sensation;strength;pain  (NWB status of R LE)   Assessment of Occupational Performance 5 or more Performance Deficits   Identified Performance Deficits dressing, bathing, toileting, functional mobility, home management, transfers   Planned Therapy Interventions (OT) ADL retraining;bed mobility training;strengthening;transfer  training;home program guidelines;progressive activity/exercise;risk factor education   Clinical Decision Making Complexity (OT) moderate complexity   Anticipated Equipment Needs Upon Discharge (OT) reacher;other (see comments)  (potentially a sock aid)   Risk & Benefits of therapy have been explained evaluation/treatment results reviewed;care plan/treatment goals reviewed   Clinical Impression Comments Pt to benefit from skilled OT to address above deficits. See daily flowsheet regarding tx provided.   OT Total Evaluation Time   OT Eval, Moderate Complexity Minutes (37190) 8   OT Goals   Therapy Frequency (OT) Daily   OT Predicted Duration/Target Date for Goal Attainment 08/27/23   Self-Care/Home Management   Self-Care/Home Mgmt/ADL, Compensatory, Meal Prep Minutes (60986) 10   Symptoms Noted During/After Treatment (Meal Preparation/Planning Training) fatigue   Treatment Detail/Skilled Intervention OT: Pt educated on LBD techniques including donning clothing to R LE 1st and doffing from R LE 2nd. Pt does not have reacher at home, briefly educated in reacher use to maintain independence with LBD -- will benefit from further assessment/practice with reacher. Educated on progression of therapy and current discharge recommendations. Pt asking about difference between IP rehab and HH vs OP therapies. Education provided. Though pt states sister and boyfriend can assist somewhat, boyfriend just had a joint replacement and it sounds like pt will need to be highly independent to return home safely. Pt declined ADLs, stated she completed this AM -- encouraged to maintain ADL routines while hospitalized.   Therapeutic Activities   Therapeutic Activity Minutes (39871) 9   Symptoms noted during/after treatment fatigue;increased pain   Treatment Detail/Skilled Intervention OT: Facilitated transfer training with focus on maintaining NWB to R LE precautions. Pt completed bed mobility with HOB elevated ~40 degrees with min A to  manage R LE. Pt able to dangle EOB SBA. STS from EOB with bed slightly elevated and CGA, pivoted to recliner with CGA and FWW. Pt positioned comfortably with leg elevated on pillows and ice pack. Educated on having nursing staff present for return to bed, encouraged to be up in chair at least an hour. All needs within reach, alarm on.   OT Discharge Planning   OT Plan OT: toileting/transfers, ambulate to BR?, endurance   OT Discharge Recommendation (DC Rec) Transitional Care Facility   OT Rationale for DC Rec Pt significantly below functional baseline with self cares, mobility, and transfers. Pt with new NWB to R LE status, and anticipating surgery in the coming days. Pt lives alone and must be highly independent in order to return home safely though she states her sister and boyfriend will be able to assist with some tasks. Pt may be a good ARU candidate, pending progress/tolerance for therapy. Anticipate IP rehab will be the best option at this time.   OT Brief overview of current status Min A bed mobility, STS from EOB with bed elevated, FWW, + CGA, pivot to chair CGA   Total Session Time   Timed Code Treatment Minutes 19   Total Session Time (sum of timed and untimed services) 27

## 2023-08-14 ENCOUNTER — ANESTHESIA EVENT (OUTPATIENT)
Dept: SURGERY | Facility: HOSPITAL | Age: 63
DRG: 493 | End: 2023-08-14
Payer: COMMERCIAL

## 2023-08-14 LAB
GLUCOSE BLDC GLUCOMTR-MCNC: 81 MG/DL (ref 70–99)
HOLD SPECIMEN: NORMAL
MAGNESIUM SERPL-MCNC: 2.2 MG/DL (ref 1.7–2.3)
POTASSIUM SERPL-SCNC: 4.6 MMOL/L (ref 3.4–5.3)

## 2023-08-14 PROCEDURE — 250N000011 HC RX IP 250 OP 636: Mod: JZ | Performed by: PAIN MEDICINE

## 2023-08-14 PROCEDURE — 250N000013 HC RX MED GY IP 250 OP 250 PS 637: Performed by: HOSPITALIST

## 2023-08-14 PROCEDURE — 120N000001 HC R&B MED SURG/OB

## 2023-08-14 PROCEDURE — 84132 ASSAY OF SERUM POTASSIUM: CPT | Performed by: INTERNAL MEDICINE

## 2023-08-14 PROCEDURE — 36415 COLL VENOUS BLD VENIPUNCTURE: CPT | Performed by: INTERNAL MEDICINE

## 2023-08-14 PROCEDURE — 99232 SBSQ HOSP IP/OBS MODERATE 35: CPT | Mod: 25 | Performed by: INTERNAL MEDICINE

## 2023-08-14 PROCEDURE — 250N000013 HC RX MED GY IP 250 OP 250 PS 637: Performed by: PAIN MEDICINE

## 2023-08-14 PROCEDURE — 83735 ASSAY OF MAGNESIUM: CPT | Performed by: INTERNAL MEDICINE

## 2023-08-14 PROCEDURE — 99232 SBSQ HOSP IP/OBS MODERATE 35: CPT | Performed by: PAIN MEDICINE

## 2023-08-14 PROCEDURE — 250N000013 HC RX MED GY IP 250 OP 250 PS 637: Performed by: STUDENT IN AN ORGANIZED HEALTH CARE EDUCATION/TRAINING PROGRAM

## 2023-08-14 RX ADMIN — FLUTICASONE FUROATE AND VILANTEROL TRIFENATATE 1 PUFF: 100; 25 POWDER RESPIRATORY (INHALATION) at 09:06

## 2023-08-14 RX ADMIN — HYDROMORPHONE HYDROCHLORIDE 4 MG: 4 TABLET ORAL at 16:01

## 2023-08-14 RX ADMIN — METHOCARBAMOL 500 MG: 500 TABLET, FILM COATED ORAL at 21:30

## 2023-08-14 RX ADMIN — BETAMETHASONE DIPROPIONATE 1 APPLICATOR: 0.5 LOTION TOPICAL at 09:07

## 2023-08-14 RX ADMIN — BUSPIRONE HYDROCHLORIDE 15 MG: 15 TABLET ORAL at 20:12

## 2023-08-14 RX ADMIN — DULOXETINE HYDROCHLORIDE 60 MG: 60 CAPSULE, DELAYED RELEASE PELLETS ORAL at 20:12

## 2023-08-14 RX ADMIN — Medication 250 MG: at 09:05

## 2023-08-14 RX ADMIN — BACLOFEN 10 MG: 10 TABLET ORAL at 20:12

## 2023-08-14 RX ADMIN — HYDROMORPHONE HYDROCHLORIDE 4 MG: 4 TABLET ORAL at 09:03

## 2023-08-14 RX ADMIN — BACLOFEN 10 MG: 10 TABLET ORAL at 09:06

## 2023-08-14 RX ADMIN — ACETAMINOPHEN 975 MG: 325 TABLET ORAL at 12:18

## 2023-08-14 RX ADMIN — HYDROMORPHONE HYDROCHLORIDE 4 MG: 4 TABLET ORAL at 21:30

## 2023-08-14 RX ADMIN — ATORVASTATIN CALCIUM 20 MG: 10 TABLET, FILM COATED ORAL at 09:06

## 2023-08-14 RX ADMIN — ZOLPIDEM TARTRATE 10 MG: 5 TABLET ORAL at 22:04

## 2023-08-14 RX ADMIN — METHOCARBAMOL 500 MG: 500 TABLET, FILM COATED ORAL at 03:53

## 2023-08-14 RX ADMIN — GABAPENTIN 700 MG: 100 CAPSULE ORAL at 20:12

## 2023-08-14 RX ADMIN — NICOTINE 1 PATCH: 14 PATCH, EXTENDED RELEASE TRANSDERMAL at 21:31

## 2023-08-14 RX ADMIN — HYDROMORPHONE HYDROCHLORIDE 0.4 MG: 0.2 INJECTION, SOLUTION INTRAMUSCULAR; INTRAVENOUS; SUBCUTANEOUS at 10:07

## 2023-08-14 RX ADMIN — SENNOSIDES AND DOCUSATE SODIUM 1 TABLET: 50; 8.6 TABLET ORAL at 20:12

## 2023-08-14 RX ADMIN — GABAPENTIN 700 MG: 100 CAPSULE ORAL at 09:04

## 2023-08-14 RX ADMIN — HYDROMORPHONE HYDROCHLORIDE 4 MG: 4 TABLET ORAL at 18:36

## 2023-08-14 RX ADMIN — ACETAMINOPHEN 975 MG: 325 TABLET ORAL at 00:22

## 2023-08-14 RX ADMIN — HYDROMORPHONE HYDROCHLORIDE 4 MG: 4 TABLET ORAL at 03:53

## 2023-08-14 RX ADMIN — METHOCARBAMOL 500 MG: 500 TABLET, FILM COATED ORAL at 09:03

## 2023-08-14 RX ADMIN — POLYETHYLENE GLYCOL 3350 17 G: 17 POWDER, FOR SOLUTION ORAL at 09:08

## 2023-08-14 RX ADMIN — HYDROMORPHONE HYDROCHLORIDE 4 MG: 4 TABLET ORAL at 12:19

## 2023-08-14 RX ADMIN — BACLOFEN 10 MG: 10 TABLET ORAL at 14:17

## 2023-08-14 RX ADMIN — BETAMETHASONE DIPROPIONATE: 0.5 LOTION TOPICAL at 20:11

## 2023-08-14 RX ADMIN — HYDROXYZINE HYDROCHLORIDE 50 MG: 25 TABLET, FILM COATED ORAL at 13:10

## 2023-08-14 RX ADMIN — Medication 25 MCG: at 09:05

## 2023-08-14 RX ADMIN — METHOCARBAMOL 500 MG: 500 TABLET, FILM COATED ORAL at 16:01

## 2023-08-14 RX ADMIN — SENNOSIDES AND DOCUSATE SODIUM 1 TABLET: 50; 8.6 TABLET ORAL at 09:08

## 2023-08-14 RX ADMIN — GABAPENTIN 700 MG: 100 CAPSULE ORAL at 14:17

## 2023-08-14 RX ADMIN — ACETAMINOPHEN 975 MG: 325 TABLET ORAL at 17:57

## 2023-08-14 RX ADMIN — BUSPIRONE HYDROCHLORIDE 15 MG: 15 TABLET ORAL at 09:06

## 2023-08-14 RX ADMIN — HYDROXYZINE HYDROCHLORIDE 50 MG: 25 TABLET, FILM COATED ORAL at 18:36

## 2023-08-14 RX ADMIN — DULOXETINE HYDROCHLORIDE 60 MG: 60 CAPSULE, DELAYED RELEASE PELLETS ORAL at 09:05

## 2023-08-14 RX ADMIN — ACETAMINOPHEN 975 MG: 325 TABLET ORAL at 05:31

## 2023-08-14 ASSESSMENT — ACTIVITIES OF DAILY LIVING (ADL)
ADLS_ACUITY_SCORE: 32

## 2023-08-14 NOTE — PROGRESS NOTES
[facility] ACUTE PAIN SERVICE    ([facility], [facility], [facility])  Daily PAIN Progress Note    Assessment/Plan:  Everett Dixon is a 63 year old female who was admitted on 8/10/2023.  I was asked to see the patient for fracture pain, after fall with displaced bimalleolar ankle fracture with lateral talar shift and angulation - pt underwent closed reduction and splinting. Admitted for fall with fracture. History of smoking, routine marijuana use, deppression, chronic gabapentin and baclofen use, anxiety, emphysema.     showsongoing use of gabapentin. Describes pain as somewhat improved with some benefit with muscle relaxer.  In the last 24 hours she has utilized 32 mg of p.o. Dilaudid.    PLAN:  Acute pain secondary to fracture of ankle. Opioid naive.   Multimodal Medication Therapy:   NSAID'S: Hold on Celebrex given potential plan for surgery   Muscle Relaxants: baclofen 10 mg po TID & added robaxin Q6h alternating   Adjuvants: APAP 975 mg q6h, gabapentin 700 mg po TID  Antidepressants/anxiolytics: buspar 15 mg BID  Opioids: continue Dilaudid 4 mg every 6 scheduled and 4 mg every 3 as needed  IV Pain medication: Dilaudid IV 6h prn   Constipation management: scheduled MiraLAX and continue senna  Discharge plan- Dilaudid per ortho. Suggest DXA scan. Likely dilaudid at the time of discharge          Subjective:    Stephie notes some improvement with muscle relaxer.  She understands potential surgery.  Discussed medication management pre and postop.      Closed bimalleolar fracture of right ankle, initial encounter   Patient Active Problem List   Diagnosis    Closed bimalleolar fracture of right ankle, initial encounter    Closed right ankle fracture        History   Drug Use Not on file         Tobacco Use      Smoking status: Every Day        Packs/day: 0.50        Years: 40.00        Pack years: 20        Types: Cigarettes      Smokeless tobacco: Never      Tobacco comments: Seen IP by CTTS on  "8/11/23- contemplating quitting in the future         acetaminophen  975 mg Oral Q6H    atorvastatin  20 mg Oral Daily    baclofen  10 mg Oral TID    betamethasone dipropionate   Topical BID    busPIRone  15 mg Oral BID    celecoxib  200 mg Oral At Bedtime    DULoxetine  60 mg Oral BID    fluticasone-vilanterol  1 puff Inhalation Daily    gabapentin  700 mg Oral TID    HYDROmorphone  4 mg Oral Q6H    Magnesium Oxide  250 mg Oral Daily    methocarbamol  500 mg Oral Q6H    nicotine  1 patch Transdermal Daily    nicotine   Transdermal Q8H    polyethylene glycol  17 g Oral Daily    senna-docusate  1 tablet Oral BID    sodium chloride (PF)  3 mL Intracatheter Q8H    Vitamin D3  25 mcg Oral Daily       Objective:  Vital signs in last 24 hours:  B/P: 136/84, T: 99.5, P: 73, R: 16   Blood pressure 110/73, pulse 69, temperature 97.9  F (36.6  C), temperature source Oral, resp. rate 18, height 1.6 m (5' 3\"), weight 59 kg (130 lb), SpO2 97 %.      Weight:   Wt Readings from Last 2 Encounters:   08/10/23 59 kg (130 lb)           Intake/Output:    Intake/Output Summary (Last 24 hours) at 8/13/2023 0737  Last data filed at 8/13/2023 0646  Gross per 24 hour   Intake 1500 ml   Output 600 ml   Net 900 ml        Review of Systems:   As per subjective, all others negative.    Physical Exam:     General Appearance:  Alert, cooperative, mild distress   Head:  Normocephalic, without obvious abnormality, atraumatic   Eyes:  PERRL, conjunctiva/corneas clear, EOM's intact   ENT/Throat: Lips, dry   Lymph/Neck: Supple, symmetrical, trachea midline, no adenopathy, thyroid: not enlarged, symmetric    Lungs:   Cough, respirations unlabored   Chest Wall:  No tenderness or deformity   Cardiovascular/Heart:  No shortness of breath   Abdomen:   Soft, non-tender, bowel sounds active all four quadrants,  no masses, no organomegaly   Musculoskeletal: Extremity elevated and in a cast, toes are pink and warm and she is able to move the toes   Skin: Skin " is tanned   Neurologic: Alert and oriented X 3, Moves all 4 extremities including the toes of the wrapped splinted extremity          Imaging:  Personally Reviewed.    Results for orders placed or performed during the hospital encounter of 08/10/23   XR Tibia and Fibula Right 2 Views    Impression    IMPRESSION: The bones are well-mineralized. The bony pelvis is intact. The femoral head and neck are intact. The hip joint is well aligned. The femur is intact. The included portions of the knee joint are well aligned. Overlying splinting material limits   evaluation of the ankle, there is a displaced bimalleolar ankle fracture with lateral talar shift and angulation. Soft tissue swelling surrounds the ankle. The remainder of the tibia and fibula are intact.   Ankle XR, G/E 3 views, right    Impression    IMPRESSION: The bones are well-mineralized. The bony pelvis is intact. The femoral head and neck are intact. The hip joint is well aligned. The femur is intact. The included portions of the knee joint are well aligned. Overlying splinting material limits   evaluation of the ankle, there is a displaced bimalleolar ankle fracture with lateral talar shift and angulation. Soft tissue swelling surrounds the ankle. The remainder of the tibia and fibula are intact.   XR Pelvis and Hip Right 2 Views    Impression    IMPRESSION: The bones are well-mineralized. The bony pelvis is intact. The femoral head and neck are intact. The hip joint is well aligned. The femur is intact. The included portions of the knee joint are well aligned. Overlying splinting material limits   evaluation of the ankle, there is a displaced bimalleolar ankle fracture with lateral talar shift and angulation. Soft tissue swelling surrounds the ankle. The remainder of the tibia and fibula are intact.   XR Femur Right 2 Views    Impression    IMPRESSION: The bones are well-mineralized. The bony pelvis is intact. The femoral head and neck are intact. The hip  joint is well aligned. The femur is intact. The included portions of the knee joint are well aligned. Overlying splinting material limits   evaluation of the ankle, there is a displaced bimalleolar ankle fracture with lateral talar shift and angulation. Soft tissue swelling surrounds the ankle. The remainder of the tibia and fibula are intact.   XR Chest 1 View    Impression    IMPRESSION: Right paratracheal density which could represent vascular structure, however adenopathy not excluded. Mild low lung volumes accentuate heart size and pulmonary vascularity. Heart size and pulmonary vascularity within normal limits. No focal   lung infiltrates. No pneumothorax seen. Osseous structures grossly intact. Visualized upper abdomen unremarkable.   Ankle XR, G/E 3 views, right    Impression    IMPRESSION: Closed reduction and splint replacement since the study from earlier today. There is improved position and alignment of the distal tibia and fibula fractures.           XR Ankle Port Right 2 Views    Impression    IMPRESSION: Films taken through casting material obscure some bony detail. The fracture of the distal fibula is still visualized without change in alignment. A fracture at the medial malleolus is difficult to appreciate through the splinting material. No   change from the previous exam.        Lab Results:  Personally Reviewed.   Last Comprehensive Metabolic Panel:  Sodium   Date Value Ref Range Status   08/11/2023 138 136 - 145 mmol/L Final     Potassium   Date Value Ref Range Status   08/14/2023 4.6 3.4 - 5.3 mmol/L Final     Chloride   Date Value Ref Range Status   08/11/2023 105 98 - 107 mmol/L Final     Carbon Dioxide (CO2)   Date Value Ref Range Status   08/11/2023 21 (L) 22 - 29 mmol/L Final     Anion Gap   Date Value Ref Range Status   08/11/2023 12 7 - 15 mmol/L Final     Glucose   Date Value Ref Range Status   08/11/2023 88 70 - 99 mg/dL Final     Urea Nitrogen   Date Value Ref Range Status    08/11/2023 9.1 8.0 - 23.0 mg/dL Final     Creatinine   Date Value Ref Range Status   08/11/2023 0.62 0.51 - 0.95 mg/dL Final     GFR Estimate   Date Value Ref Range Status   08/11/2023 >90 >60 mL/min/1.73m2 Final     Calcium   Date Value Ref Range Status   08/11/2023 9.6 8.8 - 10.2 mg/dL Final        UA:   Amphetamines Urine   Date Value Ref Range Status   08/10/2023 Screen Negative Screen Negative Final     Comment:     Cutoff for a negative amphetamine is less than 500 ng/mL.     Barbituates Urine   Date Value Ref Range Status   08/10/2023 Screen Negative Screen Negative Final     Comment:     Cutoff for a negative barbiturate is less than 200 ng/mL.     Cannabinoids Urine   Date Value Ref Range Status   08/10/2023 Screen Positive (A) Screen Negative Final     Comment:     Cutoff for a positive cannabinoid is 50 ng/mL or greater.   This is an unconfirmed screening result to be used for medical purposes only.     Cocaine Urine   Date Value Ref Range Status   08/10/2023 Screen Negative Screen Negative Final     Comment:     Cutoff for a negative cocaine is less than 300 ng/mL.     Opiates Urine   Date Value Ref Range Status   08/10/2023 Screen Negative Screen Negative Final     Comment:     Cutoff for a negative opiate is less than 300 ng/mL.     PCP Urine   Date Value Ref Range Status   08/10/2023 Screen Negative Screen Negative Final     Comment:     Cutoff for a negative PCP is less than 25 ng/mL.          Please see A&P for additional details of medical decision making.  MANAGEMENT DISCUSSED with the following over the past 24 hours: Patient   NOTE(S)/MEDICAL RECORDS REVIEWED over the past 24 hours:  reviewed notes from orthopedics as well as hospital medicine at the nursing  - Xray showing alignment  Tests REVIEWED in the past 24 hours:  - Crtcl and habg  SUPPLEMENTAL HISTORY, in addition to the patient's history, over the past 24 hours obtained from:   - pt  Medical complexity over the past 24  hours:  -------------------------- HIGH RISK FOR MORBIDITY -------------------------------------------------------------  - Parenteral (IV) CONTROLLED SUBSTANCES ordered      Alexia Panchal APRN, CNS-BC, CNP, ACHPN  Acute Care Pain Management Program   Hours of pain coverage 7a-1700- after 1700 please call the house officer    [employer] ([place] WHE[place], [place], [place], [place])   Page via [employer]- Click HERE to page Alexia or Jia text web console

## 2023-08-14 NOTE — PLAN OF CARE
Problem: Pain Acute  Goal: Optimal Pain Control and Function  Intervention: Develop Pain Management Plan  Recent Flowsheet Documentation  Taken 8/13/2023 2050 by Reba Desai RN  Pain Management Interventions:   medication (see MAR)   pillow support provided   repositioned   cold applied     Problem: Plan of Care - These are the overarching goals to be used throughout the patient stay.    Goal: Plan of Care Review  Description: The Plan of Care Review/Shift note should be completed every shift.  The Outcome Evaluation is a brief statement about your assessment that the patient is improving, declining, or no change.  This information will be displayed automatically on your shift note.  Outcome: Progressing   Goal Outcome Evaluation:             Scheduled dilaudid and Robaxin given. Right leg elevated and ice applied. CMS intact. Patient rates pain at an 8/10.

## 2023-08-14 NOTE — PLAN OF CARE
"  Problem: Plan of Care - These are the overarching goals to be used throughout the patient stay.    Goal: Optimal Comfort and Wellbeing  Outcome: Progressing     Problem: Pain Acute  Goal: Optimal Pain Control and Function  Outcome: Progressing  Intervention: Prevent or Manage Pain  Recent Flowsheet Documentation  Taken 8/14/2023 0025 by Ihsan Gates RN  Bowel Elimination Promotion: adequate fluid intake promoted  Medication Review/Management: medications reviewed   Goal Outcome Evaluation:  Patient had a better night with pain control. Rated pain at \"6\" most of the time before and after meds. Took scheduled meds,  no PRNs administered this shift. Mood much improved. Voided large amounts in bedpan.   Makes her needs known, call light within reach.                      "

## 2023-08-14 NOTE — PLAN OF CARE
Problem: Plan of Care - These are the overarching goals to be used throughout the patient stay.    Goal: Plan of Care Review  Description: The Plan of Care Review/Shift note should be completed every shift.  The Outcome Evaluation is a brief statement about your assessment that the patient is improving, declining, or no change.  This information will be displayed automatically on your shift note.  Outcome: Progressing   Goal Outcome Evaluation:  Reviewed plan of care with pt -she is alert and oriented. Rates pain as better-but still  a 7 after scheduled dilaudid plus IV dilaudid and po dilaudid for breakthru pain. Pt transfers easily with walker and 1 assist to commode and chair-leg elevated as much as possible and cold packs applied x2. Will have surgery tomorrow-pt aware-no tome yet.

## 2023-08-14 NOTE — PROGRESS NOTES
"Orthopedic Progress Note      Assessment:    Right ankle bimalleolar fracture, dislocation, reduced    Plan:   - No concern for compartment syndrome.  Toes wiggle and are warm, well perfused.  Continue to monitor.   - Aggressive elevation of RLE  - Weightbearing status: NWB RLE  - Pain Management per primary; Feels pain is better   - Surgical fixation of the ankle tomorrow morning with Dr. Santiago - NPO at midnight.  Medical optimization per hospitalist team.    Subjective:    Patient reports 10/10 pain not well controlled on IV pain meds.  All questions/concerns answered.      Objective:  /73 (BP Location: Right arm)   Pulse 69   Temp 97.9  F (36.6  C) (Oral)   Resp 18   Ht 1.6 m (5' 3\")   Wt 59 kg (130 lb)   LMP  (LMP Unknown)   SpO2 97%   BMI 23.03 kg/m      Patient is A&Ox3, lying in bed  Calves without tenderness, neg Ling's  Brisk capillary refill in the toes.   Unable to palpate pulses due to splint  Right foot warm & well-perfused.  Sensation is intact to light touch & equal bilaterally in toes  Splint clean and dry - wrapping is not too tight    Contralateral side= Full range of motion, Negative joint instability findings, 5/5 motor groups about the joint, Non-tender.       Pertinent Labs   Lab Results: personally reviewed.   Lab Results   Component Value Date    INR 0.96 08/10/2023    INR 1.00 08/10/2023     Lab Results   Component Value Date    WBC 11.3 (H) 08/10/2023    HGB 10.9 (L) 08/10/2023    HCT 33.7 (L) 08/10/2023    MCV 97 08/10/2023     08/10/2023     Lab Results   Component Value Date     08/11/2023    CO2 21 (L) 08/11/2023         Report completed by:  ABHI TIERNEY PA-C  Date: 08/14/2023  [facility]    "

## 2023-08-15 ENCOUNTER — APPOINTMENT (OUTPATIENT)
Dept: RADIOLOGY | Facility: HOSPITAL | Age: 63
DRG: 493 | End: 2023-08-15
Payer: COMMERCIAL

## 2023-08-15 ENCOUNTER — ANESTHESIA (OUTPATIENT)
Dept: SURGERY | Facility: HOSPITAL | Age: 63
DRG: 493 | End: 2023-08-15
Payer: COMMERCIAL

## 2023-08-15 ENCOUNTER — APPOINTMENT (OUTPATIENT)
Dept: RADIOLOGY | Facility: HOSPITAL | Age: 63
DRG: 493 | End: 2023-08-15
Attending: ORTHOPAEDIC SURGERY
Payer: COMMERCIAL

## 2023-08-15 ENCOUNTER — ANCILLARY PROCEDURE (OUTPATIENT)
Dept: ULTRASOUND IMAGING | Facility: HOSPITAL | Age: 63
End: 2023-08-15
Attending: STUDENT IN AN ORGANIZED HEALTH CARE EDUCATION/TRAINING PROGRAM
Payer: COMMERCIAL

## 2023-08-15 LAB
ANION GAP SERPL CALCULATED.3IONS-SCNC: 8 MMOL/L (ref 7–15)
ATRIAL RATE - MUSE: 69 BPM
BUN SERPL-MCNC: 13.2 MG/DL (ref 8–23)
CALCIUM SERPL-MCNC: 9.8 MG/DL (ref 8.8–10.2)
CHLORIDE SERPL-SCNC: 102 MMOL/L (ref 98–107)
CREAT SERPL-MCNC: 0.69 MG/DL (ref 0.51–0.95)
DEPRECATED HCO3 PLAS-SCNC: 28 MMOL/L (ref 22–29)
DIASTOLIC BLOOD PRESSURE - MUSE: NORMAL MMHG
ERYTHROCYTE [DISTWIDTH] IN BLOOD BY AUTOMATED COUNT: 13.6 % (ref 10–15)
GFR SERPL CREATININE-BSD FRML MDRD: >90 ML/MIN/1.73M2
GLUCOSE SERPL-MCNC: 81 MG/DL (ref 70–99)
HCT VFR BLD AUTO: 32.1 % (ref 35–47)
HGB BLD-MCNC: 10.5 G/DL (ref 11.7–15.7)
INTERPRETATION ECG - MUSE: NORMAL
MAGNESIUM SERPL-MCNC: 2.2 MG/DL (ref 1.7–2.3)
MCH RBC QN AUTO: 31.3 PG (ref 26.5–33)
MCHC RBC AUTO-ENTMCNC: 32.7 G/DL (ref 31.5–36.5)
MCV RBC AUTO: 96 FL (ref 78–100)
P AXIS - MUSE: 70 DEGREES
PLATELET # BLD AUTO: 323 10E3/UL (ref 150–450)
POTASSIUM SERPL-SCNC: 4.3 MMOL/L (ref 3.4–5.3)
PR INTERVAL - MUSE: 138 MS
QRS DURATION - MUSE: 88 MS
QT - MUSE: 426 MS
QTC - MUSE: 456 MS
R AXIS - MUSE: 34 DEGREES
RBC # BLD AUTO: 3.36 10E6/UL (ref 3.8–5.2)
SODIUM SERPL-SCNC: 138 MMOL/L (ref 136–145)
SYSTOLIC BLOOD PRESSURE - MUSE: NORMAL MMHG
T AXIS - MUSE: 56 DEGREES
TROPONIN T SERPL HS-MCNC: <6 NG/L
VENTRICULAR RATE- MUSE: 69 BPM
WBC # BLD AUTO: 6.6 10E3/UL (ref 4–11)

## 2023-08-15 PROCEDURE — 82310 ASSAY OF CALCIUM: CPT | Performed by: INTERNAL MEDICINE

## 2023-08-15 PROCEDURE — 250N000013 HC RX MED GY IP 250 OP 250 PS 637: Performed by: PAIN MEDICINE

## 2023-08-15 PROCEDURE — 93005 ELECTROCARDIOGRAM TRACING: CPT

## 2023-08-15 PROCEDURE — 272N000001 HC OR GENERAL SUPPLY STERILE: Performed by: ORTHOPAEDIC SURGERY

## 2023-08-15 PROCEDURE — 250N000013 HC RX MED GY IP 250 OP 250 PS 637: Performed by: HOSPITALIST

## 2023-08-15 PROCEDURE — 999N000180 XR SURGERY CARM FLUORO LESS THAN 5 MIN

## 2023-08-15 PROCEDURE — 370N000017 HC ANESTHESIA TECHNICAL FEE, PER MIN: Performed by: ORTHOPAEDIC SURGERY

## 2023-08-15 PROCEDURE — 258N000003 HC RX IP 258 OP 636

## 2023-08-15 PROCEDURE — 360N000084 HC SURGERY LEVEL 4 W/ FLUORO, PER MIN: Performed by: ORTHOPAEDIC SURGERY

## 2023-08-15 PROCEDURE — 99232 SBSQ HOSP IP/OBS MODERATE 35: CPT | Performed by: INTERNAL MEDICINE

## 2023-08-15 PROCEDURE — 710N000009 HC RECOVERY PHASE 1, LEVEL 1, PER MIN: Performed by: ORTHOPAEDIC SURGERY

## 2023-08-15 PROCEDURE — C1713 ANCHOR/SCREW BN/BN,TIS/BN: HCPCS | Performed by: ORTHOPAEDIC SURGERY

## 2023-08-15 PROCEDURE — 85027 COMPLETE CBC AUTOMATED: CPT | Performed by: INTERNAL MEDICINE

## 2023-08-15 PROCEDURE — 84484 ASSAY OF TROPONIN QUANT: CPT | Performed by: STUDENT IN AN ORGANIZED HEALTH CARE EDUCATION/TRAINING PROGRAM

## 2023-08-15 PROCEDURE — 36415 COLL VENOUS BLD VENIPUNCTURE: CPT | Performed by: INTERNAL MEDICINE

## 2023-08-15 PROCEDURE — 93005 ELECTROCARDIOGRAM TRACING: CPT | Performed by: STUDENT IN AN ORGANIZED HEALTH CARE EDUCATION/TRAINING PROGRAM

## 2023-08-15 PROCEDURE — 0QSJ04Z REPOSITION RIGHT FIBULA WITH INTERNAL FIXATION DEVICE, OPEN APPROACH: ICD-10-PCS | Performed by: ORTHOPAEDIC SURGERY

## 2023-08-15 PROCEDURE — 999N000065 XR ANKLE PORT RIGHT 2 VIEWS

## 2023-08-15 PROCEDURE — 999N000141 HC STATISTIC PRE-PROCEDURE NURSING ASSESSMENT: Performed by: ORTHOPAEDIC SURGERY

## 2023-08-15 PROCEDURE — 250N000011 HC RX IP 250 OP 636: Performed by: STUDENT IN AN ORGANIZED HEALTH CARE EDUCATION/TRAINING PROGRAM

## 2023-08-15 PROCEDURE — 250N000011 HC RX IP 250 OP 636: Performed by: PHYSICIAN ASSISTANT

## 2023-08-15 PROCEDURE — 83735 ASSAY OF MAGNESIUM: CPT | Performed by: INTERNAL MEDICINE

## 2023-08-15 PROCEDURE — 36415 COLL VENOUS BLD VENIPUNCTURE: CPT | Performed by: STUDENT IN AN ORGANIZED HEALTH CARE EDUCATION/TRAINING PROGRAM

## 2023-08-15 PROCEDURE — 250N000011 HC RX IP 250 OP 636

## 2023-08-15 PROCEDURE — 250N000009 HC RX 250: Performed by: ORTHOPAEDIC SURGERY

## 2023-08-15 PROCEDURE — 250N000013 HC RX MED GY IP 250 OP 250 PS 637: Performed by: STUDENT IN AN ORGANIZED HEALTH CARE EDUCATION/TRAINING PROGRAM

## 2023-08-15 PROCEDURE — 0QSG04Z REPOSITION RIGHT TIBIA WITH INTERNAL FIXATION DEVICE, OPEN APPROACH: ICD-10-PCS | Performed by: ORTHOPAEDIC SURGERY

## 2023-08-15 PROCEDURE — 93010 ELECTROCARDIOGRAM REPORT: CPT | Performed by: INTERNAL MEDICINE

## 2023-08-15 PROCEDURE — 250N000011 HC RX IP 250 OP 636: Mod: JW | Performed by: STUDENT IN AN ORGANIZED HEALTH CARE EDUCATION/TRAINING PROGRAM

## 2023-08-15 PROCEDURE — 250N000013 HC RX MED GY IP 250 OP 250 PS 637

## 2023-08-15 PROCEDURE — 258N000003 HC RX IP 258 OP 636: Performed by: ANESTHESIOLOGY

## 2023-08-15 PROCEDURE — 120N000001 HC R&B MED SURG/OB

## 2023-08-15 PROCEDURE — 250N000009 HC RX 250: Performed by: STUDENT IN AN ORGANIZED HEALTH CARE EDUCATION/TRAINING PROGRAM

## 2023-08-15 PROCEDURE — 250N000013 HC RX MED GY IP 250 OP 250 PS 637: Performed by: INTERNAL MEDICINE

## 2023-08-15 DEVICE — IMP SCR SYN CORTEX 3.5X36MM SELF TAP SS 204.836: Type: IMPLANTABLE DEVICE | Site: ANKLE | Status: FUNCTIONAL

## 2023-08-15 DEVICE — IMP SCR SYN CORTEX 3.5X12MM SELF TAP SS 204.812: Type: IMPLANTABLE DEVICE | Site: ANKLE | Status: FUNCTIONAL

## 2023-08-15 DEVICE — IMP SCR SYN CORTEX 3.5X34MM SELF TAP SS 204.834: Type: IMPLANTABLE DEVICE | Site: ANKLE | Status: FUNCTIONAL

## 2023-08-15 DEVICE — IMP SCR SYN CAN 4.0X60MM LONG THRD SS 207.760: Type: IMPLANTABLE DEVICE | Site: ANKLE | Status: FUNCTIONAL

## 2023-08-15 DEVICE — IMP WIRE KIRSCHNER 1.25X150MM 292.12: Type: IMPLANTABLE DEVICE | Site: ANKLE | Status: FUNCTIONAL

## 2023-08-15 DEVICE — IMP SCR SYN LCP DIST 2.7X12MM SELF TAP SS 202.212: Type: IMPLANTABLE DEVICE | Site: ANKLE | Status: FUNCTIONAL

## 2023-08-15 DEVICE — IMPLANTABLE DEVICE: Type: IMPLANTABLE DEVICE | Site: ANKLE | Status: FUNCTIONAL

## 2023-08-15 DEVICE — IMP PLATE SYN 1/3 TUBULAR 57MM 05H SS 241.351: Type: IMPLANTABLE DEVICE | Site: ANKLE | Status: FUNCTIONAL

## 2023-08-15 DEVICE — IMP SCR SYN CORTEX 3.5X50MM SELF TAP SS 204.850: Type: IMPLANTABLE DEVICE | Site: ANKLE | Status: FUNCTIONAL

## 2023-08-15 DEVICE — IMP SCR SYN LCP DIST 2.7X14MM SELF TAP SS 202.214: Type: IMPLANTABLE DEVICE | Site: ANKLE | Status: FUNCTIONAL

## 2023-08-15 DEVICE — SCREW CORTEX 3.5 X 42MM 204.842: Type: IMPLANTABLE DEVICE | Site: ANKLE | Status: FUNCTIONAL

## 2023-08-15 DEVICE — IMP SCR SYN CORTEX 2.7X20MM SELF TAP SS 202.820: Type: IMPLANTABLE DEVICE | Site: ANKLE | Status: FUNCTIONAL

## 2023-08-15 RX ORDER — MAGNESIUM HYDROXIDE 1200 MG/15ML
LIQUID ORAL PRN
Status: DISCONTINUED | OUTPATIENT
Start: 2023-08-15 | End: 2023-08-15 | Stop reason: HOSPADM

## 2023-08-15 RX ORDER — NALOXONE HYDROCHLORIDE 0.4 MG/ML
0.2 INJECTION, SOLUTION INTRAMUSCULAR; INTRAVENOUS; SUBCUTANEOUS
Status: DISCONTINUED | OUTPATIENT
Start: 2023-08-15 | End: 2023-08-15 | Stop reason: HOSPADM

## 2023-08-15 RX ORDER — PROPOFOL 10 MG/ML
INJECTION, EMULSION INTRAVENOUS CONTINUOUS PRN
Status: DISCONTINUED | OUTPATIENT
Start: 2023-08-15 | End: 2023-08-15

## 2023-08-15 RX ORDER — POLYETHYLENE GLYCOL 3350 17 G/17G
17 POWDER, FOR SOLUTION ORAL DAILY
Status: DISCONTINUED | OUTPATIENT
Start: 2023-08-16 | End: 2023-08-18 | Stop reason: HOSPADM

## 2023-08-15 RX ORDER — ONDANSETRON 4 MG/1
4 TABLET, ORALLY DISINTEGRATING ORAL EVERY 6 HOURS PRN
Status: DISCONTINUED | OUTPATIENT
Start: 2023-08-15 | End: 2023-08-18 | Stop reason: HOSPADM

## 2023-08-15 RX ORDER — SODIUM CHLORIDE, SODIUM LACTATE, POTASSIUM CHLORIDE, CALCIUM CHLORIDE 600; 310; 30; 20 MG/100ML; MG/100ML; MG/100ML; MG/100ML
INJECTION, SOLUTION INTRAVENOUS CONTINUOUS
Status: DISCONTINUED | OUTPATIENT
Start: 2023-08-15 | End: 2023-08-15 | Stop reason: HOSPADM

## 2023-08-15 RX ORDER — ASPIRIN 81 MG/1
81 TABLET ORAL 2 TIMES DAILY
Status: DISCONTINUED | OUTPATIENT
Start: 2023-08-16 | End: 2023-08-18 | Stop reason: HOSPADM

## 2023-08-15 RX ORDER — ACETAMINOPHEN 325 MG/1
650 TABLET ORAL EVERY 4 HOURS PRN
Status: DISCONTINUED | OUTPATIENT
Start: 2023-08-18 | End: 2023-08-18 | Stop reason: HOSPADM

## 2023-08-15 RX ORDER — FENTANYL CITRATE 50 UG/ML
25-50 INJECTION, SOLUTION INTRAMUSCULAR; INTRAVENOUS
Status: DISCONTINUED | OUTPATIENT
Start: 2023-08-15 | End: 2023-08-15 | Stop reason: HOSPADM

## 2023-08-15 RX ORDER — PROPOFOL 10 MG/ML
INJECTION, EMULSION INTRAVENOUS PRN
Status: DISCONTINUED | OUTPATIENT
Start: 2023-08-15 | End: 2023-08-15

## 2023-08-15 RX ORDER — ACETAMINOPHEN 325 MG/1
975 TABLET ORAL EVERY 8 HOURS
Status: DISCONTINUED | OUTPATIENT
Start: 2023-08-15 | End: 2023-08-15

## 2023-08-15 RX ORDER — BUPIVACAINE HYDROCHLORIDE 2.5 MG/ML
INJECTION, SOLUTION EPIDURAL; INFILTRATION; INTRACAUDAL
Status: COMPLETED | OUTPATIENT
Start: 2023-08-15 | End: 2023-08-15

## 2023-08-15 RX ORDER — ONDANSETRON 2 MG/ML
INJECTION INTRAMUSCULAR; INTRAVENOUS PRN
Status: DISCONTINUED | OUTPATIENT
Start: 2023-08-15 | End: 2023-08-15

## 2023-08-15 RX ORDER — LIDOCAINE 40 MG/G
CREAM TOPICAL
Status: DISCONTINUED | OUTPATIENT
Start: 2023-08-15 | End: 2023-08-15 | Stop reason: HOSPADM

## 2023-08-15 RX ORDER — HYDROXYZINE HYDROCHLORIDE 25 MG/1
25 TABLET, FILM COATED ORAL EVERY 6 HOURS PRN
Status: DISCONTINUED | OUTPATIENT
Start: 2023-08-15 | End: 2023-08-15

## 2023-08-15 RX ORDER — ASPIRIN 81 MG/1
81 TABLET ORAL 2 TIMES DAILY
Status: DISCONTINUED | OUTPATIENT
Start: 2023-08-15 | End: 2023-08-15

## 2023-08-15 RX ORDER — PROCHLORPERAZINE MALEATE 10 MG
10 TABLET ORAL EVERY 6 HOURS PRN
Status: DISCONTINUED | OUTPATIENT
Start: 2023-08-15 | End: 2023-08-18 | Stop reason: HOSPADM

## 2023-08-15 RX ORDER — CEFAZOLIN SODIUM/WATER 2 G/20 ML
2 SYRINGE (ML) INTRAVENOUS SEE ADMIN INSTRUCTIONS
Status: DISCONTINUED | OUTPATIENT
Start: 2023-08-15 | End: 2023-08-15 | Stop reason: HOSPADM

## 2023-08-15 RX ORDER — SODIUM CHLORIDE, SODIUM LACTATE, POTASSIUM CHLORIDE, CALCIUM CHLORIDE 600; 310; 30; 20 MG/100ML; MG/100ML; MG/100ML; MG/100ML
INJECTION, SOLUTION INTRAVENOUS CONTINUOUS
Status: DISCONTINUED | OUTPATIENT
Start: 2023-08-15 | End: 2023-08-16

## 2023-08-15 RX ORDER — LIDOCAINE 40 MG/G
CREAM TOPICAL
Status: DISCONTINUED | OUTPATIENT
Start: 2023-08-15 | End: 2023-08-18 | Stop reason: HOSPADM

## 2023-08-15 RX ORDER — ONDANSETRON 2 MG/ML
4 INJECTION INTRAMUSCULAR; INTRAVENOUS EVERY 6 HOURS PRN
Status: DISCONTINUED | OUTPATIENT
Start: 2023-08-15 | End: 2023-08-18 | Stop reason: HOSPADM

## 2023-08-15 RX ORDER — NALOXONE HYDROCHLORIDE 0.4 MG/ML
0.4 INJECTION, SOLUTION INTRAMUSCULAR; INTRAVENOUS; SUBCUTANEOUS
Status: DISCONTINUED | OUTPATIENT
Start: 2023-08-15 | End: 2023-08-15 | Stop reason: HOSPADM

## 2023-08-15 RX ORDER — HYDROMORPHONE HYDROCHLORIDE 4 MG/1
4 TABLET ORAL EVERY 4 HOURS PRN
Status: DISCONTINUED | OUTPATIENT
Start: 2023-08-15 | End: 2023-08-16

## 2023-08-15 RX ORDER — LIDOCAINE HYDROCHLORIDE 10 MG/ML
INJECTION, SOLUTION INFILTRATION; PERINEURAL PRN
Status: DISCONTINUED | OUTPATIENT
Start: 2023-08-15 | End: 2023-08-15

## 2023-08-15 RX ORDER — CEFAZOLIN SODIUM/WATER 2 G/20 ML
2 SYRINGE (ML) INTRAVENOUS
Status: COMPLETED | OUTPATIENT
Start: 2023-08-15 | End: 2023-08-15

## 2023-08-15 RX ORDER — BISACODYL 10 MG
10 SUPPOSITORY, RECTAL RECTAL DAILY PRN
Status: DISCONTINUED | OUTPATIENT
Start: 2023-08-15 | End: 2023-08-18 | Stop reason: HOSPADM

## 2023-08-15 RX ORDER — FLUMAZENIL 0.1 MG/ML
0.2 INJECTION, SOLUTION INTRAVENOUS
Status: DISCONTINUED | OUTPATIENT
Start: 2023-08-15 | End: 2023-08-15 | Stop reason: HOSPADM

## 2023-08-15 RX ORDER — CEFAZOLIN SODIUM 1 G/3ML
1 INJECTION, POWDER, FOR SOLUTION INTRAMUSCULAR; INTRAVENOUS EVERY 8 HOURS
Status: COMPLETED | OUTPATIENT
Start: 2023-08-15 | End: 2023-08-16

## 2023-08-15 RX ORDER — FENTANYL CITRATE 0.05 MG/ML
INJECTION, SOLUTION INTRAMUSCULAR; INTRAVENOUS PRN
Status: DISCONTINUED | OUTPATIENT
Start: 2023-08-15 | End: 2023-08-15

## 2023-08-15 RX ORDER — BUPIVACAINE HYDROCHLORIDE 5 MG/ML
INJECTION, SOLUTION EPIDURAL; INTRACAUDAL
Status: COMPLETED | OUTPATIENT
Start: 2023-08-15 | End: 2023-08-15

## 2023-08-15 RX ORDER — AMOXICILLIN 250 MG
1 CAPSULE ORAL 2 TIMES DAILY
Status: DISCONTINUED | OUTPATIENT
Start: 2023-08-15 | End: 2023-08-16

## 2023-08-15 RX ADMIN — PROPOFOL 40 MG: 10 INJECTION, EMULSION INTRAVENOUS at 11:41

## 2023-08-15 RX ADMIN — BUPIVACAINE HYDROCHLORIDE 17 ML: 5 INJECTION, SOLUTION EPIDURAL; INTRACAUDAL; PERINEURAL at 09:15

## 2023-08-15 RX ADMIN — METHOCARBAMOL 500 MG: 500 TABLET, FILM COATED ORAL at 15:57

## 2023-08-15 RX ADMIN — Medication 2 G: at 09:59

## 2023-08-15 RX ADMIN — DULOXETINE HYDROCHLORIDE 60 MG: 60 CAPSULE, DELAYED RELEASE PELLETS ORAL at 20:14

## 2023-08-15 RX ADMIN — ONDANSETRON 4 MG: 2 INJECTION INTRAMUSCULAR; INTRAVENOUS at 11:35

## 2023-08-15 RX ADMIN — ZOLPIDEM TARTRATE 10 MG: 5 TABLET ORAL at 22:17

## 2023-08-15 RX ADMIN — BACLOFEN 10 MG: 10 TABLET ORAL at 14:48

## 2023-08-15 RX ADMIN — PROPOFOL 20 MG: 10 INJECTION, EMULSION INTRAVENOUS at 10:24

## 2023-08-15 RX ADMIN — CALCIUM CARBONATE (ANTACID) CHEW TAB 500 MG 500 MG: 500 CHEW TAB at 14:48

## 2023-08-15 RX ADMIN — METHOCARBAMOL 500 MG: 500 TABLET, FILM COATED ORAL at 04:15

## 2023-08-15 RX ADMIN — ACETAMINOPHEN 975 MG: 325 TABLET ORAL at 00:19

## 2023-08-15 RX ADMIN — CEFAZOLIN 1 G: 1 INJECTION, POWDER, FOR SOLUTION INTRAMUSCULAR; INTRAVENOUS at 18:38

## 2023-08-15 RX ADMIN — LIDOCAINE HYDROCHLORIDE 25 MG: 10 INJECTION, SOLUTION INFILTRATION; PERINEURAL at 10:00

## 2023-08-15 RX ADMIN — PROPOFOL 30 MG: 10 INJECTION, EMULSION INTRAVENOUS at 10:29

## 2023-08-15 RX ADMIN — FENTANYL CITRATE 75 MCG: 0.05 INJECTION, SOLUTION INTRAMUSCULAR; INTRAVENOUS at 12:26

## 2023-08-15 RX ADMIN — FENTANYL CITRATE 50 MCG: 50 INJECTION, SOLUTION INTRAMUSCULAR; INTRAVENOUS at 09:15

## 2023-08-15 RX ADMIN — SODIUM CHLORIDE, POTASSIUM CHLORIDE, SODIUM LACTATE AND CALCIUM CHLORIDE 75 ML/HR: 600; 310; 30; 20 INJECTION, SOLUTION INTRAVENOUS at 14:29

## 2023-08-15 RX ADMIN — PROPOFOL 200 MCG/KG/MIN: 10 INJECTION, EMULSION INTRAVENOUS at 10:01

## 2023-08-15 RX ADMIN — CALCIUM CARBONATE (ANTACID) CHEW TAB 500 MG 500 MG: 500 CHEW TAB at 00:24

## 2023-08-15 RX ADMIN — BUPIVACAINE HYDROCHLORIDE 10 ML: 2.5 INJECTION, SOLUTION EPIDURAL; INFILTRATION; INTRACAUDAL at 09:20

## 2023-08-15 RX ADMIN — FENTANYL CITRATE 25 MCG: 0.05 INJECTION, SOLUTION INTRAMUSCULAR; INTRAVENOUS at 12:30

## 2023-08-15 RX ADMIN — SODIUM CHLORIDE, POTASSIUM CHLORIDE, SODIUM LACTATE AND CALCIUM CHLORIDE: 600; 310; 30; 20 INJECTION, SOLUTION INTRAVENOUS at 11:52

## 2023-08-15 RX ADMIN — BACLOFEN 10 MG: 10 TABLET ORAL at 20:14

## 2023-08-15 RX ADMIN — SODIUM CHLORIDE, POTASSIUM CHLORIDE, SODIUM LACTATE AND CALCIUM CHLORIDE: 600; 310; 30; 20 INJECTION, SOLUTION INTRAVENOUS at 08:54

## 2023-08-15 RX ADMIN — HYDROMORPHONE HYDROCHLORIDE 4 MG: 4 TABLET ORAL at 15:57

## 2023-08-15 RX ADMIN — SODIUM PHOSPHATE, DIBASIC AND SODIUM PHOSPHATE, MONOBASIC 1 ENEMA: 7; 19 ENEMA RECTAL at 15:13

## 2023-08-15 RX ADMIN — BETAMETHASONE DIPROPIONATE: 0.5 LOTION TOPICAL at 20:13

## 2023-08-15 RX ADMIN — CALCIUM CARBONATE (ANTACID) CHEW TAB 500 MG 500 MG: 500 CHEW TAB at 22:23

## 2023-08-15 RX ADMIN — BUSPIRONE HYDROCHLORIDE 15 MG: 15 TABLET ORAL at 20:14

## 2023-08-15 RX ADMIN — PROPOFOL 40 MG: 10 INJECTION, EMULSION INTRAVENOUS at 11:50

## 2023-08-15 RX ADMIN — MIDAZOLAM HYDROCHLORIDE 0.5 MG: 1 INJECTION, SOLUTION INTRAMUSCULAR; INTRAVENOUS at 09:15

## 2023-08-15 RX ADMIN — HYDROMORPHONE HYDROCHLORIDE 4 MG: 4 TABLET ORAL at 04:15

## 2023-08-15 RX ADMIN — ACETAMINOPHEN 975 MG: 325 TABLET ORAL at 18:38

## 2023-08-15 RX ADMIN — HYDROMORPHONE HYDROCHLORIDE 4 MG: 4 TABLET ORAL at 22:17

## 2023-08-15 RX ADMIN — ACETAMINOPHEN 975 MG: 325 TABLET ORAL at 06:16

## 2023-08-15 RX ADMIN — GABAPENTIN 700 MG: 100 CAPSULE ORAL at 14:48

## 2023-08-15 RX ADMIN — GABAPENTIN 700 MG: 100 CAPSULE ORAL at 20:11

## 2023-08-15 RX ADMIN — METHOCARBAMOL 500 MG: 500 TABLET, FILM COATED ORAL at 22:17

## 2023-08-15 ASSESSMENT — ACTIVITIES OF DAILY LIVING (ADL)
ADLS_ACUITY_SCORE: 27
ADLS_ACUITY_SCORE: 32
ADLS_ACUITY_SCORE: 30
ADLS_ACUITY_SCORE: 27

## 2023-08-15 ASSESSMENT — COPD QUESTIONNAIRES: COPD: 1

## 2023-08-15 NOTE — PROGRESS NOTES
[facility]    Medicine Progress Note - Hospitalist Service    Date of Admission:  8/10/2023    Assessment & Plan   Everett Dixon is a 63 year old female admitted on 8/10/2023. She presented to the ER after a mechanical fall accident.  Right ankle x-ray with displaced bimalleolar ankle fracture with lateral talar shift and angulation.     Mechanical fall  Right foot pain  Closed right ankle fracture and dislocation  -Sustained a mechanical fall accident in her niece's house while getting out of bathroom she was tipped and fell.  Right ankle x-ray significant for displaced bimalleolar ankle fracture  -Had a remote history of left ankle fracture in 2003  -CT 8/13: Acute comminuted fracture of the lateral malleolus, acute moderately displaced transverse fracture of the medial malleolus, small acute mildly displaced fracture of the lateral portion of the posterior malleolus, small acute mildly displaced fracture of the anterior lateral corner of the distal tibia, mild lateral subluxation of the talus with mild widening of the ankle mortise  -Pain team consulted, appreciate recommendations: Given a dose of Toradol today and will start celecoxib 200 mg at bedtime, continue baclofen, gabapentin, BuSpar, adding Robaxin, continue oral Dilaudid, and decrease IV Dilaudid to every 6 hours as needed, starting bowel regimen  -NWB in RLE and aggressive elevation  -[facility] consulted: surgery tomorrow     Active smoker  Cannabis use  -UDS positive for cannabinoids on admission  -Counseling, nicotine patch or gum    Elevated BP with no diagnosis of hypertension  -Manage pain adequately.  -Hydralazine prn ordered.    Mild MARVIN - resolved    HypoK - replete per protocol         Diet: Regular Diet Adult  NPO per Anesthesia Guidelines for Procedure/Surgery Except for: Meds    DVT Prophylaxis: PCDs  [place] Catheter: Not present  Lines: None     Cardiac Monitoring: None  Code Status: Full Code      Clinically  Significant Risk Factors                                    Disposition Plan      Expected Discharge Date: 08/18/2023    Discharge Delays: Placement - TCU  Procedure Pending (enter procedure & time in comments)  Destination: home;home with family  Discharge Comments: surgery tuesday          Alia Fletcher MD  Hospitalist Service  [facility]  Securely message with [employer] (more info)  Text page via [employer] Paging/Directory   ______________________________________________________________________    Interval History   Mrs. Dixon is doing well. Pain is controlled. Will have surgery tomorrow     Physical Exam   Vital Signs: Temp: 97.7  F (36.5  C) Temp src: Oral BP: (!) 140/77 Pulse: 76   Resp: 18 SpO2: 99 % O2 Device: Nasal cannula Oxygen Delivery: 2 LPM  Weight: 130 lbs 0 oz    General Appearance: Awake, alert, in no acute distress  Respiratory: CTAB, no wheeze  Cardiovascular: RRR, no murmur noted  GI: soft, nontender, non distended, normal bowel sounds  Skin: no jaundice, no rash      Medical Decision Making       40 MINUTES SPENT BY ME on the date of service doing chart review, history, exam, documentation & further activities per the note.      Data     I have personally reviewed the following data over the past 24 hrs:    N/A  \   N/A   / N/A     N/A N/A N/A /  81   4.6 N/A N/A \       Imaging results reviewed over the past 24 hrs:   No results found for this or any previous visit (from the past 24 hour(s)).

## 2023-08-15 NOTE — PLAN OF CARE
Problem: Plan of Care - These are the overarching goals to be used throughout the patient stay.    Goal: Plan of Care Review  Description: The Plan of Care Review/Shift note should be completed every shift.  The Outcome Evaluation is a brief statement about your assessment that the patient is improving, declining, or no change.  This information will be displayed automatically on your shift note.  8/15/2023 1505 by Mandy Gu, RN  Outcome: Progressing  8/15/2023 0837 by Mandy Gu, RN  Outcome: Progressing   Goal Outcome Evaluation:  Back from OR at 1415-voided 450mls on commode right away-No pain in right leg-abdominal discomfortextends up kinto left chest -feel like she needs to have a bm-fleets ordered.

## 2023-08-15 NOTE — ANESTHESIA PROCEDURE NOTES
"Adductor canal Procedure Note    Pre-Procedure   Staff -        Anesthesiologist:  Ochoa Pederson MD       Performed By: anesthesiologist       Procedure Start/Stop Times: 8/15/2023 9:20 AM and 8/15/2023 9:25 AM       Pre-Anesthestic Checklist: patient identified, IV checked, site marked, risks and benefits discussed, informed consent, monitors and equipment checked, pre-op evaluation, at physician/surgeon's request and post-op pain management  Timeout:       Correct Patient: Yes        Correct Procedure: Yes        Correct Site: Yes        Correct Position: Yes        Correct Laterality: Yes        Site Marked: Yes  Procedure Documentation  Procedure: Adductor canal       Laterality: right       Patient Position: supine       Needle Type: insulated       Needle Gauge: 21.        Needle Length (Inches): 4        Ultrasound guided       1. Ultrasound was used to identify targeted nerve, plexus, vascular marker, or fascial plane and place a needle adjacent to it in real-time.       2. Ultrasound was used to visualize the spread of anesthetic in close proximity to the above referenced structure.       3. A permanent image is entered into the patient's record.    Assessment/Narrative         The placement was negative for: blood aspirated, painful injection and site bleeding       Paresthesias: No.       Bolus given via needle..        Secured via.        Insertion/Infusion Method: Single Shot       Complications: none    Medication(s) Administered   Bupivacaine 0.25% PF (Infiltration) - Infiltration   10 mL - 8/15/2023 9:20:00 AM  Medication Administration Time: 8/15/2023 9:20 AM      FOR [facility] ONLY:   Pain Team Contact information: please page the Pain Team Via [employer]. Search \"Pain\". During daytime hours, please page the attending first. At night please page the resident first.      "

## 2023-08-15 NOTE — ANESTHESIA CARE TRANSFER NOTE
Patient: Everett Dixon    Procedure: Procedure(s):  OPEN REDUCTION INTERNAL FIXATION, FRACTURE, ANKLE       Diagnosis: Closed bimalleolar fracture of right ankle, initial encounter [S86.275C]  Diagnosis Additional Information: No value filed.    Anesthesia Type:   No value filed.     Note:    Oropharynx: oropharynx clear of all foreign objects and spontaneously breathing  Level of Consciousness: awake  Oxygen Supplementation: room air    Independent Airway: airway patency satisfactory and stable  Dentition: dentition unchanged  Vital Signs Stable: post-procedure vital signs reviewed and stable  Report to RN Given: handoff report given  Patient transferred to: PACU  Comments: Patient complaining of 9/10 chest pain. MD aware. 12 lead ordered. Fentanyl administered  Handoff Report: Identifed the Patient, Identified the Reponsible Provider, Reviewed the pertinent medical history, Discussed the surgical course, Reviewed Intra-OP anesthesia mangement and issues during anesthesia, Set expectations for post-procedure period and Allowed opportunity for questions and acknowledgement of understanding      Vitals:  Vitals Value Taken Time   /81 08/15/23 1225   Temp 98.5F    Pulse 67 08/15/23 1228   Resp 17 08/15/23 1228   SpO2 99 % 08/15/23 1228   Vitals shown include unvalidated device data.    Electronically Signed By: ALESSIA Amin CRNA  August 15, 2023  12:30 PM

## 2023-08-15 NOTE — PROGRESS NOTES
Will not see today:  PAIN MANAGEMENT SERVICE CHART CHECK    This patient's chart has been reviewed by the Pain Service. Patient at surgery at time of visit earlier today.  Chart reviewed, pain appears under good control.  No change to current plan we will follow up and see patient tomorrow AM.      Thank you!    Meenu AGGARWAL, CNS-BC, DNP  Acute Care Pain Management Program  [facility] ([place], [place])   Preference if for [facility] Sandra Bo  Click HERE to page Monique

## 2023-08-15 NOTE — PROGRESS NOTES
Fleets enema given with good results and abdominal chest discomfort now at a 2. Pt voided again at this time.

## 2023-08-15 NOTE — PLAN OF CARE
Goal Outcome Evaluation:  To OR at this time-hads milo bath-voided at 0700-NPO after MN-no beta blockers, Report given to OLIVIA.

## 2023-08-15 NOTE — PLAN OF CARE
"  Problem: Plan of Care - These are the overarching goals to be used throughout the patient stay.    Goal: Plan of Care Review  Description: The Plan of Care Review/Shift note should be completed every shift.  The Outcome Evaluation is a brief statement about your assessment that the patient is improving, declining, or no change.  This information will be displayed automatically on your shift note.  Outcome: Progressing  Flowsheets (Taken 8/14/2023 2257)  Plan of Care Reviewed With: patient  Overall Patient Progress: improving  Goal: Patient-Specific Goal (Individualized)  Description: You can add care plan individualizations to a care plan. Examples of Individualization might be:  \"Parent requests to be called daily at 9am for status\", \"I have a hard time hearing out of my right ear\", or \"Do not touch me to wake me up as it startles me\".  Outcome: Progressing  Goal: Absence of Hospital-Acquired Illness or Injury  Outcome: Progressing  Intervention: Identify and Manage Fall Risk  Recent Flowsheet Documentation  Taken 8/14/2023 1605 by Chanelle Joe RN  Safety Promotion/Fall Prevention:   activity supervised   lighting adjusted  Intervention: Prevent Skin Injury  Recent Flowsheet Documentation  Taken 8/14/2023 1605 by Chanelle Joe RN  Body Position: position changed independently  Intervention: Prevent and Manage VTE (Venous Thromboembolism) Risk  Recent Flowsheet Documentation  Taken 8/14/2023 1605 by Chanelle Joe RN  VTE Prevention/Management:   patient refused intervention   SCDs (sequential compression devices) off  Goal: Optimal Comfort and Wellbeing  Outcome: Progressing  Intervention: Monitor Pain and Promote Comfort  Recent Flowsheet Documentation  Taken 8/14/2023 1601 by Chanelle Joe RN  Pain Management Interventions: medication (see MAR)  Goal: Readiness for Transition of Care  Outcome: Progressing   Goal Outcome Evaluation:      Plan of Care Reviewed With: patient    Overall Patient " Progress: [facility] Patient Progress: improving    Patient is doing well today for pain control.  She has received prn and scheduled PO Dilaudid, Atarax, Robaxin, and Tylenol.  She has also been elevating and icing her right leg and knee.  She is scheduled to have surgery tomorrow am at 09:30.  She has voided many times on the bedside commode.

## 2023-08-15 NOTE — PROGRESS NOTES
[facility]    Medicine Progress Note - Hospitalist Service    Date of Admission:  8/10/2023    Assessment & Plan   Everett Dixon is a 63 year old female admitted on 8/10/2023. She presented to the ER after a mechanical fall accident.  Right ankle x-ray with displaced bimalleolar ankle fracture with lateral talar shift and angulation.     Mechanical fall,   Right ankle fracture with dislocation status post repair-POD 0  -Sustained a mechanical fall accident in her niece's house while getting out of bathroom she was tipped and fell.  Right ankle x-ray significant for displaced bimalleolar ankle fracture  -Had a remote history of left ankle fracture in 2003  -CT 8/13: Acute comminuted fracture of the lateral malleolus, acute moderately displaced transverse fracture of the medial malleolus, small acute mildly displaced fracture of the lateral portion of the posterior malleolus, small acute mildly displaced fracture of the anterior lateral corner of the distal tibia, mild lateral subluxation of the talus with mild widening of the ankle mortise  -Pain team consulted, appreciate recommendations: Given a dose of Toradol today and will start celecoxib 200 mg at bedtime, continue baclofen, gabapentin, BuSpar, adding Robaxin, continue oral Dilaudid, and decrease IV Dilaudid to every 6 hours as needed, starting bowel regimen  -NWB in RLE and aggressive elevation  -[facility] consulted: Had surgery today     Active smoker  Cannabis use  -UDS positive for cannabinoids on admission  -Counseling, nicotine patch or gum    Elevated BP with no diagnosis of hypertension  -Manage pain adequately.  -Hydralazine prn ordered.    Mild MARVIN - resolved    HypoK - replete per protocol         Diet: Advance Diet as Tolerated: Regular Diet Adult    DVT Prophylaxis: Pneumatic Compression Devices  [place] Catheter: Not present  Lines: None     Cardiac Monitoring: None  Code Status: Full Code      Clinically Significant Risk  "Factors                                    Disposition Plan      Expected Discharge Date: 08/17/2023    Discharge Delays: Placement - TCU  Procedure Pending (enter procedure & time in comments)  Destination: home;home with family  Discharge Comments: surgery tuesday          Alia Fletcher MD  Hospitalist Service  [facility]  Securely message with [employer] (more info)  Text page via [employer] Paging/Directory   ______________________________________________________________________    Interval History   Ms. forde was having abdominal pain after her surgery today.  She had an enema which resolved the issue.  Otherwise she is not having complaints.  Tolerated surgery well.    Physical Exam   Vital Signs: Temp: 98.6  F (37  C) Temp src: Oral BP: (!) 163/86 Pulse: 74   Resp: 16 SpO2: 99 % O2 Device: None (Room air) Oxygen Delivery: 4 LPM  Weight: 130 lbs 0 oz    General Appearance: Awake, alert, in no acute distress  Respiratory: CTAB, no wheeze  Cardiovascular: RRR, no murmur noted  GI: soft, nontender, non distended, normal bowel sounds  Skin: no jaundice, no rash      Medical Decision Making       45 MINUTES SPENT BY ME on the date of service doing chart review, history, exam, documentation & further activities per the note.      Data     I have personally reviewed the following data over the past 24 hrs:    6.6  \   10.5 (L)   / 323     138 102 13.2 /  81   4.3 28 0.69 \     Trop: <6 BNP: N/A       Imaging results reviewed over the past 24 hrs:   Recent Results (from the past 24 hour(s))   POC US Guidance Needle Placement    Narrative    Ultrasound was performed as guidance to an anesthesia procedure.  Click   \"PACS images\" hyperlink below to view any stored images.  For specific   procedure details, view procedure note authored by anesthesia.   XR Surgery MINDI Fluoro L/T 5 Min    Narrative    This exam was marked as non-reportable because it will not be read by a   radiologist or a [facility] " non-radiologist provider.         XR Ankle Port Right 2 Views    Narrative    EXAM: XR ANKLE PORT RIGHT 2 VIEWS  LOCATION: [facility]  DATE: 8/15/2023    INDICATION: Status post right bimalleolar open reduction internal fixation.    COMPARISON: 8/13/2023.      Impression    IMPRESSION: Fixated distal tibia and fibular fractures with splint in place status post ORIF. Alignment has improved.

## 2023-08-15 NOTE — ANESTHESIA PREPROCEDURE EVALUATION
Anesthesia Pre-Procedure Evaluation    Patient: Everett Dixon   MRN: [id number] : 1960        Procedure : Procedure(s):  OPEN REDUCTION INTERNAL FIXATION, FRACTURE, ANKLE          Past Medical History:   Diagnosis Date    Allergic reaction     Arthritis     Depressive disorder     Emphysema lung (H)     Esophageal reflux     Fibromyalgia     High cholesterol     History of pulmonary embolism     Hx of blood clots     PONV (postoperative nausea and vomiting)       Past Surgical History:   Procedure Laterality Date    GYN SURGERY      HYSTERECTOMY        Allergies   Allergen Reactions    Penicillins Rash      Social History     Tobacco Use    Smoking status: Every Day     Packs/day: 0.50     Years: 40.00     Pack years: 20.00     Types: Cigarettes    Smokeless tobacco: Never    Tobacco comments:     Seen IP by CTTS on 23- contemplating quitting in the future   Substance Use Topics    Alcohol use: Not Currently      Wt Readings from Last 1 Encounters:   08/10/23 59 kg (130 lb)        Anesthesia Evaluation   Pt has had prior anesthetic.     History of anesthetic complications  - PONV.      ROS/MED HX  ENT/Pulmonary:     (+)                         COPD,              Neurologic:  - neg neurologic ROS     Cardiovascular:  - neg cardiovascular ROS     METS/Exercise Tolerance:     Hematologic:  - neg hematologic  ROS     Musculoskeletal: Comment: R bimalleolar fracture      GI/Hepatic:     (+) GERD,                   Renal/Genitourinary:  - neg Renal ROS     Endo:  - neg endo ROS     Psychiatric/Substance Use:  - neg psychiatric ROS     Infectious Disease:  - neg infectious disease ROS     Malignancy:  - neg malignancy ROS     Other:  - neg other ROS          Physical Exam    Airway  airway exam normal           Respiratory Devices and Support         Dental       (+) Completely normal teeth      Cardiovascular   cardiovascular exam normal          Pulmonary   pulmonary exam normal                OUTSIDE  LABS:  CBC:   Lab Results   Component Value Date    WBC 6.6 08/15/2023    WBC 11.3 (H) 08/10/2023    HGB 10.5 (L) 08/15/2023    HGB 10.9 (L) 08/10/2023    HCT 32.1 (L) 08/15/2023    HCT 33.7 (L) 08/10/2023     08/15/2023     08/10/2023     BMP:   Lab Results   Component Value Date     08/15/2023     08/11/2023    POTASSIUM 4.3 08/15/2023    POTASSIUM 4.6 08/14/2023    CHLORIDE 102 08/15/2023    CHLORIDE 105 08/11/2023    CO2 28 08/15/2023    CO2 21 (L) 08/11/2023    BUN 13.2 08/15/2023    BUN 9.1 08/11/2023    CR 0.69 08/15/2023    CR 0.62 08/11/2023    GLC 81 08/15/2023    GLC 81 08/14/2023     COAGS:   Lab Results   Component Value Date    INR 0.96 08/10/2023     POC: No results found for: BGM, HCG, HCGS  HEPATIC:   Lab Results   Component Value Date    ALBUMIN 3.8 08/10/2023    PROTTOTAL 6.5 08/10/2023    ALT 15 08/10/2023    AST 33 08/10/2023    ALKPHOS 51 08/10/2023    BILITOTAL 0.3 08/10/2023     OTHER:   Lab Results   Component Value Date    DEANN 9.8 08/15/2023    MAG 2.2 08/15/2023       Anesthesia Plan    ASA Status:  3    NPO Status:  NPO Appropriate    Anesthesia Type: General.     - Airway: Native airway              Consents    Anesthesia Plan(s) and associated risks, benefits, and realistic alternatives discussed. Questions answered and patient/representative(s) expressed understanding.     - Discussed: Risks, Benefits and Alternatives for BOTH SEDATION and the PROCEDURE were discussed     - Discussed with:  Patient      - Extended Intubation/Ventilatory Support Discussed: No.      - Patient is DNR/DNI Status: No     Use of blood products discussed: No .     Postoperative Care    Pain management: IV analgesics, Peripheral nerve block (Single Shot), Multi-modal analgesia.   PONV prophylaxis: Ondansetron (or other 5HT-3), Background Propofol Infusion     Comments:                Ochoa Pederson MD

## 2023-08-15 NOTE — ANESTHESIA PROCEDURE NOTES
"Sciatic Procedure Note    Pre-Procedure   Staff -        Anesthesiologist:  Ochoa Pederson MD       Performed By: anesthesiologist       Location: pre-op       Procedure Start/Stop Times: 8/15/2023 9:15 AM and 8/15/2023 9:20 AM       Pre-Anesthestic Checklist: patient identified, IV checked, site marked, risks and benefits discussed, informed consent, monitors and equipment checked, pre-op evaluation, at physician/surgeon's request and post-op pain management  Timeout:       Correct Patient: Yes        Correct Procedure: Yes        Correct Site: Yes        Correct Position: Yes        Correct Laterality: Yes        Site Marked: Yes  Procedure Documentation  Procedure: Sciatic       Laterality: right       Patient Position: supine       Skin prep: Chloraprep (popliteal approach).       Needle Type: short bevel       Needle Gauge: 21.        Needle Length (Inches): 4        Ultrasound guided       1. Ultrasound was used to identify targeted nerve, plexus, vascular marker, or fascial plane and place a needle adjacent to it in real-time.       2. Ultrasound was used to visualize the spread of anesthetic in close proximity to the above referenced structure.       3. A permanent image is entered into the patient's record.    Assessment/Narrative         The placement was negative for: blood aspirated and site bleeding       Paresthesias: No.       Bolus given via needle. no blood aspirated via catheter.        Secured via.        Insertion/Infusion Method: Single Shot       Complications: none    Medication(s) Administered   Bupivacaine 0.5% PF (Infiltration) - Infiltration   17 mL - 8/15/2023 9:15:00 AM  Medication Administration Time: 8/15/2023 9:15 AM      FOR [facility] ([facility]) ONLY:   Pain Team Contact information: please page the Pain Team Via [employer]. Search \"Pain\". During daytime hours, please page the attending first. At night please page the resident first.      "

## 2023-08-15 NOTE — PLAN OF CARE
Problem: Pain Acute  Goal: Optimal Pain Control and Function  Intervention: Develop Pain Management Plan  Recent Flowsheet Documentation  Taken 8/15/2023 0415 by Sarah Maldonado RN  Pain Management Interventions: medication (see MAR)  Taken 8/15/2023 0019 by Sarah Maldonado RN  Pain Management Interventions:   medication (see MAR)   pillow support provided  Intervention: Prevent or Manage Pain  Recent Flowsheet Documentation  Taken 8/15/2023 0027 by Sarah Maldonado RN  Medication Review/Management: medications reviewed   Goal Outcome Evaluation:  Pt has slept most of the shift though reports wakes up off/on with little spikes of pain.  Pt did not call for prn meds only needed scheduled doses.  Though pt's pain rating is still on the higher side pt's demeanor and behavior much improved.  Reports po dilaudid much more effective In controlling pain.  Pt anticipating surgery this am.  CHG bath done this am.  Pt voided, brushed teeth, washed face, hair combed and gown changed in prep from surgery this am.

## 2023-08-15 NOTE — OP NOTE
Operative Note    Name:  Everett Dixon  PCP:  No Ref-Primary, Physician  Procedure Date:  8/10/2023 - 8/15/2023    Pre-Procedure Diagnosis:  Closed bimalleolar fracture of right ankle, initial encounter [S82.848I]     Post-Procedure Diagnosis:    Same     Procedure: Procedure(s):  OPEN REDUCTION INTERNAL FIXATION, FRACTURE, ANKLE     Surgeon(s):  Angela Santiago MD    Circulator: Miriam Mayberry RN; Schoenecker, Carla J, RN  Scrub Person: Mike Gao; Shelby Torres  X-Ray Technologist: Deniz Garrett ARRT   A PAC was necessary to ensure safety of this patient and adequate progression of the procedure.    Anesthesia Type:  Choice     Estimated Blood Loss:   100cc    Complications:    None    Procedure: After being informed of the risks, benefits, and alternatives to the procedure, the patient desired to proceed and was brought to the operating suite where the patient was placed under moderate sedation anesthetic. The patient received 2 grams of Ancef preoperatively.  She did receive preoperative nerve block for postoperative pain control.  A tourniquet was applied to the right upper calf.  The right leg was then prepped and draped in the usual sterile fashion.  A timeout verification step was completed.    I began by elevating the right leg for approximately 5 minutes.  Tourniquet was inflated to 250 mmHg.  I began by creating a direct lateral incision over the fibula.  Scalpel was used to incise through skin and subcutaneous tissue.  Soft tissue was elevated off the lateral aspect of the fibula both proximal and distal to the fracture site.  The fracture was highly comminuted.  There was an intra-articular fragment which was noted on the CT scan which was removed from the lateral anterior aspect of the tibiotalar joint.  I then as best possible attempted to reconstruct the lateral cortex.  I was unable to obtain a true cortical read.  As such I reduce the fracture fragments as best possible and applied a lateral  fibular plate Synthes to the distal fibula and distracted through the distal segment and then inserted some proximal screws in the cortex of the fibula proximal to the fracture site in order to achieve a bridge plating construct.  This was checked with direct visualization as well as fluoroscopy to ensure length alignment and rotation of the fibula had been restored as best possible.  The bone quality was very poor.  I did insert a single 2.7 mm lag screw through an anterolateral distal articular fragment.  I then inserted locking screws distally and some additional 3.5 millimeter screws proximally.    I then turned my attention to the medial malleolus.  Scalpel was used to incise through skin and subcutaneous tissue down onto the fracture site.  This was reduced after clearing hematoma and debris from the fracture site.  Inserted 2 1.25 mm K wires perpendicular to the fracture site again because of the exceedingly poor quality of bone I supplemented the fixation with a 5 hole one third semitubular plate as a buttress.  I then also inserted a additional screw extending into the distal fibula.  I then inserted some 4.0 mm cannulated screws over the K wires.  I then removed some of the distal fibular screws and holding the foot in a neutral dorsiflexed position and ensuring that the fibula was reduced in the incisura I inserted some fibula pro tibia screws for additional fixation.    I then stressed the ankle under live fluoroscopy.  There was no further instability with external rotation.  There is no instability with valgus.  There was some residual instability with varus.  I elected to not try and reconstruct the ATFL CFL because the distal fibular bone was so comminuted that I would not be able to insert suture anchors and it was minimal degree of instability.  Based on the injury and bony and soft tissue healing I suspect she will if anything becomes stiff.  The wounds were irrigated copiously with normal saline.   2-0 Vicryl was used for subcutaneous closure.  Interrupted 3-0 nylon sutures were used for skin closure.  A sterile dressing was applied and patient was splinted in neutral dorsiflexed position.    Postoperative plan:  She is to be Strictly nonweightbearing for minimum of 6 weeks potentially longer because of her poor bone density.  She is to keep the splint clean dry and intact until we see her back in clinic in 2 weeks time.  Angela Santiago MD    Date: 8/15/2023  Time: 11:49 AM      Implant Name Type Inv. Item Serial No.  Lot No. LRB No. Used Action   IMP SCR SYN CORTEX 2.7X20MM SELF TAP .820 - [id number] Metallic Hardware/[place] IMP SCR SYN CORTEX 2.7X20MM SELF TAP .820  [employer]  Right 1 Implanted   IMP SCR SYN CORTEX 3.5X12MM SELF TAP .812 - [id number] Metallic Hardware/[place] IMP SCR SYN CORTEX 3.5X12MM SELF TAP .812  [employer]  Right 1 Implanted   IMP SCR SYN CORTEX 3.5X14MM SELF TAP .814 - [id number] Metallic Hardware/[place] IMP SCR SYN CORTEX 3.5X14MM SELF TAP .814  [employer]  Right 2 Implanted   IMP SCR SYN CORTEX 3.5X34MM SELF TAP .834 - [id number] Metallic Hardware/[place] IMP SCR SYN CORTEX 3.5X34MM SELF TAP .834  [employer]  Right 1 Implanted

## 2023-08-15 NOTE — ANESTHESIA POSTPROCEDURE EVALUATION
Patient: Everett Dixon    Procedure: Procedure(s):  OPEN REDUCTION INTERNAL FIXATION, FRACTURE, ANKLE       Anesthesia Type:  No value filed.    Note:  Disposition: Inpatient   Postop Pain Control: Uneventful            Sign Out: Well controlled pain   PONV: No   Neuro/Psych: Uneventful            Sign Out: Acceptable/Baseline neuro status   Airway/Respiratory: Uneventful            Sign Out: Acceptable/Baseline resp. status   CV/Hemodynamics: Uneventful            Sign Out: Acceptable CV status; No obvious hypovolemia; No obvious fluid overload (Patient with L sided chest pain radiating to L back on arrival to PACU. 12 lead ECG unchanged from 8/10 and troponin negative. Patient's pain improved with sitting position, feels similar to prior reflux episodes now per patient.)   Other NRE:    DID A NON-ROUTINE EVENT OCCUR? No           Last vitals:  Vitals Value Taken Time   /94 08/15/23 1330   Temp 36.8  C (98.3  F) 08/15/23 1320   Pulse 84 08/15/23 1345   Resp 17 08/15/23 1340   SpO2 97 % 08/15/23 1345   Vitals shown include unvalidated device data.    Electronically Signed By: Ochoa Pederson MD  August 15, 2023  1:47 PM

## 2023-08-15 NOTE — PROGRESS NOTES
"Care Management Follow Up    Length of Stay (days): 5    Expected Discharge Date: 08/18/2023     Concerns to be Addressed: discharge planning   Patient plan of care discussed at interdisciplinary rounds: Yes    Anticipated Discharge Disposition:       Anticipated Discharge Services:      Education Provided on the Discharge Plan:  Per Care Team   Patient/Family in Agreement with the Plan:  Yes    Referrals Placed by CM/SW:    Private pay costs discussed: Not applicable    Additional Information:  Chart reviewed.    Cm updates:  Therapy recs TCU or possible ARU, pt in OR this morning ,writer will ask pt later today which ones pt may prefer and if she is ok with going.      Per therapy notes pt stated that pts sister and boyfriend would  be able to assist with certain tasks at home.      Ortho following pt per note yesterday:  \"Plan:   - No concern for compartment syndrome.  Toes wiggle and are warm, well perfused.  Continue to monitor.   - Aggressive elevation of RLE  - Weightbearing status: NWB RLE  - Pain Management per primary; Feels pain is better   - Surgical fixation of the ankle tomorrow morning with Dr. Santiago - NPO at midnight.  Medical optimization per hospitalist team.\"      Social Hx:   \"Pt lives alone and is independent at baseline.  Family can provide transportation. Pt is non weight bearing on her R ankle.  She is in a lot of pain at this time.  She may need to go to TCU at discharge due to her non-weight bearing status. \"      Cm will continue to follow plan of care, review recommendations, and assist with any discharge needs anticipated.       Kimberley Ramirez RN      "

## 2023-08-16 ENCOUNTER — APPOINTMENT (OUTPATIENT)
Dept: OCCUPATIONAL THERAPY | Facility: HOSPITAL | Age: 63
DRG: 493 | End: 2023-08-16
Payer: COMMERCIAL

## 2023-08-16 ENCOUNTER — APPOINTMENT (OUTPATIENT)
Dept: PHYSICAL THERAPY | Facility: HOSPITAL | Age: 63
DRG: 493 | End: 2023-08-16
Payer: COMMERCIAL

## 2023-08-16 LAB
ANION GAP SERPL CALCULATED.3IONS-SCNC: 10 MMOL/L (ref 7–15)
BUN SERPL-MCNC: 10.6 MG/DL (ref 8–23)
CALCIUM SERPL-MCNC: 9 MG/DL (ref 8.8–10.2)
CHLORIDE SERPL-SCNC: 101 MMOL/L (ref 98–107)
CREAT SERPL-MCNC: 0.69 MG/DL (ref 0.51–0.95)
DEPRECATED HCO3 PLAS-SCNC: 26 MMOL/L (ref 22–29)
ERYTHROCYTE [DISTWIDTH] IN BLOOD BY AUTOMATED COUNT: 13.4 % (ref 10–15)
GFR SERPL CREATININE-BSD FRML MDRD: >90 ML/MIN/1.73M2
GLUCOSE SERPL-MCNC: 98 MG/DL (ref 70–99)
HCT VFR BLD AUTO: 31 % (ref 35–47)
HGB BLD-MCNC: 10.3 G/DL (ref 11.7–15.7)
MAGNESIUM SERPL-MCNC: 1.8 MG/DL (ref 1.7–2.3)
MCH RBC QN AUTO: 31.4 PG (ref 26.5–33)
MCHC RBC AUTO-ENTMCNC: 33.2 G/DL (ref 31.5–36.5)
MCV RBC AUTO: 95 FL (ref 78–100)
PLATELET # BLD AUTO: 318 10E3/UL (ref 150–450)
POTASSIUM SERPL-SCNC: 3.9 MMOL/L (ref 3.4–5.3)
RBC # BLD AUTO: 3.28 10E6/UL (ref 3.8–5.2)
SODIUM SERPL-SCNC: 137 MMOL/L (ref 136–145)
WBC # BLD AUTO: 8 10E3/UL (ref 4–11)

## 2023-08-16 PROCEDURE — 250N000013 HC RX MED GY IP 250 OP 250 PS 637: Performed by: HOSPITALIST

## 2023-08-16 PROCEDURE — 250N000013 HC RX MED GY IP 250 OP 250 PS 637: Performed by: PAIN MEDICINE

## 2023-08-16 PROCEDURE — 250N000013 HC RX MED GY IP 250 OP 250 PS 637

## 2023-08-16 PROCEDURE — 99232 SBSQ HOSP IP/OBS MODERATE 35: CPT | Performed by: CLINICAL NURSE SPECIALIST

## 2023-08-16 PROCEDURE — 80048 BASIC METABOLIC PNL TOTAL CA: CPT | Performed by: INTERNAL MEDICINE

## 2023-08-16 PROCEDURE — 250N000011 HC RX IP 250 OP 636

## 2023-08-16 PROCEDURE — 250N000013 HC RX MED GY IP 250 OP 250 PS 637: Performed by: INTERNAL MEDICINE

## 2023-08-16 PROCEDURE — 250N000013 HC RX MED GY IP 250 OP 250 PS 637: Performed by: CLINICAL NURSE SPECIALIST

## 2023-08-16 PROCEDURE — 97110 THERAPEUTIC EXERCISES: CPT | Mod: GO

## 2023-08-16 PROCEDURE — 250N000013 HC RX MED GY IP 250 OP 250 PS 637: Performed by: STUDENT IN AN ORGANIZED HEALTH CARE EDUCATION/TRAINING PROGRAM

## 2023-08-16 PROCEDURE — 97116 GAIT TRAINING THERAPY: CPT | Mod: GP

## 2023-08-16 PROCEDURE — 97535 SELF CARE MNGMENT TRAINING: CPT | Mod: GO

## 2023-08-16 PROCEDURE — 97530 THERAPEUTIC ACTIVITIES: CPT | Mod: GP

## 2023-08-16 PROCEDURE — 250N000013 HC RX MED GY IP 250 OP 250 PS 637: Performed by: PHYSICIAN ASSISTANT

## 2023-08-16 PROCEDURE — 99233 SBSQ HOSP IP/OBS HIGH 50: CPT | Performed by: INTERNAL MEDICINE

## 2023-08-16 PROCEDURE — 85027 COMPLETE CBC AUTOMATED: CPT | Performed by: INTERNAL MEDICINE

## 2023-08-16 PROCEDURE — 36415 COLL VENOUS BLD VENIPUNCTURE: CPT | Performed by: INTERNAL MEDICINE

## 2023-08-16 PROCEDURE — 120N000001 HC R&B MED SURG/OB

## 2023-08-16 PROCEDURE — 83735 ASSAY OF MAGNESIUM: CPT | Performed by: INTERNAL MEDICINE

## 2023-08-16 PROCEDURE — 250N000011 HC RX IP 250 OP 636: Mod: JZ | Performed by: INTERNAL MEDICINE

## 2023-08-16 RX ORDER — AMOXICILLIN 250 MG
2 CAPSULE ORAL 2 TIMES DAILY
Qty: 28 TABLET | Refills: 0 | Status: SHIPPED | OUTPATIENT
Start: 2023-08-16 | End: 2023-08-23

## 2023-08-16 RX ORDER — HYDROMORPHONE HYDROCHLORIDE 2 MG/1
2-4 TABLET ORAL
Status: DISCONTINUED | OUTPATIENT
Start: 2023-08-16 | End: 2023-08-18 | Stop reason: HOSPADM

## 2023-08-16 RX ORDER — ACETAMINOPHEN 325 MG/1
975 TABLET ORAL EVERY 6 HOURS
Qty: 60 TABLET | Refills: 0 | Status: SHIPPED | OUTPATIENT
Start: 2023-08-16

## 2023-08-16 RX ORDER — ASPIRIN 81 MG/1
81 TABLET ORAL 2 TIMES DAILY
Qty: 60 TABLET | Refills: 0 | Status: SHIPPED | OUTPATIENT
Start: 2023-08-16

## 2023-08-16 RX ORDER — ENOXAPARIN SODIUM 100 MG/ML
40 INJECTION SUBCUTANEOUS EVERY 24 HOURS
Status: DISCONTINUED | OUTPATIENT
Start: 2023-08-16 | End: 2023-08-18 | Stop reason: HOSPADM

## 2023-08-16 RX ORDER — AMOXICILLIN 250 MG
2 CAPSULE ORAL 2 TIMES DAILY
Status: DISCONTINUED | OUTPATIENT
Start: 2023-08-16 | End: 2023-08-18 | Stop reason: HOSPADM

## 2023-08-16 RX ADMIN — BETAMETHASONE DIPROPIONATE: 0.5 LOTION TOPICAL at 10:16

## 2023-08-16 RX ADMIN — ACETAMINOPHEN 975 MG: 325 TABLET ORAL at 14:02

## 2023-08-16 RX ADMIN — METHOCARBAMOL 500 MG: 500 TABLET, FILM COATED ORAL at 22:06

## 2023-08-16 RX ADMIN — HYDROMORPHONE HYDROCHLORIDE 4 MG: 4 TABLET ORAL at 10:20

## 2023-08-16 RX ADMIN — DULOXETINE HYDROCHLORIDE 60 MG: 60 CAPSULE, DELAYED RELEASE PELLETS ORAL at 09:20

## 2023-08-16 RX ADMIN — BETAMETHASONE DIPROPIONATE: 0.5 LOTION TOPICAL at 20:24

## 2023-08-16 RX ADMIN — METHOCARBAMOL 500 MG: 500 TABLET, FILM COATED ORAL at 10:20

## 2023-08-16 RX ADMIN — BACLOFEN 10 MG: 10 TABLET ORAL at 09:19

## 2023-08-16 RX ADMIN — CEFAZOLIN 1 G: 1 INJECTION, POWDER, FOR SOLUTION INTRAMUSCULAR; INTRAVENOUS at 01:58

## 2023-08-16 RX ADMIN — BACLOFEN 10 MG: 10 TABLET ORAL at 20:24

## 2023-08-16 RX ADMIN — ATORVASTATIN CALCIUM 20 MG: 10 TABLET, FILM COATED ORAL at 09:20

## 2023-08-16 RX ADMIN — BUSPIRONE HYDROCHLORIDE 15 MG: 15 TABLET ORAL at 09:26

## 2023-08-16 RX ADMIN — METHOCARBAMOL 500 MG: 500 TABLET, FILM COATED ORAL at 04:38

## 2023-08-16 RX ADMIN — FLUTICASONE FUROATE AND VILANTEROL TRIFENATATE 1 PUFF: 100; 25 POWDER RESPIRATORY (INHALATION) at 09:21

## 2023-08-16 RX ADMIN — Medication 250 MG: at 09:20

## 2023-08-16 RX ADMIN — GABAPENTIN 700 MG: 100 CAPSULE ORAL at 09:20

## 2023-08-16 RX ADMIN — Medication 81 MG: at 09:20

## 2023-08-16 RX ADMIN — BACLOFEN 10 MG: 10 TABLET ORAL at 14:02

## 2023-08-16 RX ADMIN — ACETAMINOPHEN 975 MG: 325 TABLET ORAL at 06:48

## 2023-08-16 RX ADMIN — SENNOSIDES AND DOCUSATE SODIUM 2 TABLET: 50; 8.6 TABLET ORAL at 20:20

## 2023-08-16 RX ADMIN — HYDROMORPHONE HYDROCHLORIDE 4 MG: 4 TABLET ORAL at 16:30

## 2023-08-16 RX ADMIN — ACETAMINOPHEN 975 MG: 325 TABLET ORAL at 18:00

## 2023-08-16 RX ADMIN — BUSPIRONE HYDROCHLORIDE 15 MG: 15 TABLET ORAL at 20:23

## 2023-08-16 RX ADMIN — HYDROMORPHONE HYDROCHLORIDE 2 MG: 2 TABLET ORAL at 13:06

## 2023-08-16 RX ADMIN — ACETAMINOPHEN 975 MG: 325 TABLET ORAL at 00:34

## 2023-08-16 RX ADMIN — Medication 25 MCG: at 09:20

## 2023-08-16 RX ADMIN — HYDROMORPHONE HYDROCHLORIDE 4 MG: 4 TABLET ORAL at 04:38

## 2023-08-16 RX ADMIN — ENOXAPARIN SODIUM 40 MG: 40 INJECTION SUBCUTANEOUS at 14:03

## 2023-08-16 RX ADMIN — NICOTINE 1 PATCH: 14 PATCH, EXTENDED RELEASE TRANSDERMAL at 00:36

## 2023-08-16 RX ADMIN — GABAPENTIN 700 MG: 100 CAPSULE ORAL at 20:20

## 2023-08-16 RX ADMIN — HYDROMORPHONE HYDROCHLORIDE 2 MG: 2 TABLET ORAL at 09:26

## 2023-08-16 RX ADMIN — HYDROMORPHONE HYDROCHLORIDE 4 MG: 2 TABLET ORAL at 20:21

## 2023-08-16 RX ADMIN — METHOCARBAMOL 500 MG: 500 TABLET, FILM COATED ORAL at 16:29

## 2023-08-16 RX ADMIN — Medication 81 MG: at 20:20

## 2023-08-16 RX ADMIN — DULOXETINE HYDROCHLORIDE 60 MG: 60 CAPSULE, DELAYED RELEASE PELLETS ORAL at 20:20

## 2023-08-16 RX ADMIN — ZOLPIDEM TARTRATE 10 MG: 5 TABLET ORAL at 22:06

## 2023-08-16 RX ADMIN — HYDROMORPHONE HYDROCHLORIDE 4 MG: 4 TABLET ORAL at 22:06

## 2023-08-16 RX ADMIN — GABAPENTIN 700 MG: 100 CAPSULE ORAL at 14:02

## 2023-08-16 ASSESSMENT — ACTIVITIES OF DAILY LIVING (ADL)
ADLS_ACUITY_SCORE: 24
ADLS_ACUITY_SCORE: 26
ADLS_ACUITY_SCORE: 24
ADLS_ACUITY_SCORE: 26
ADLS_ACUITY_SCORE: 24
ADLS_ACUITY_SCORE: 24
ADLS_ACUITY_SCORE: 26

## 2023-08-16 NOTE — PROGRESS NOTES
"Orthopedic Progress Note      Assessment: 1 Day Post-Op  S/P Procedure(s):  OPEN REDUCTION INTERNAL FIXATION, FRACTURE,RIGHT ANKLE     Plan:   - Continue PT/OT  - Weightbearing status: NWB RLE  - Activity: Up with assist and assistive device until independent.  - Anticoagulation: ASA 81 PO BID in addition to SCDs, terry stockings and early ambulation.  - Antibiotics: Ancef, complete  - Pain Management; Per pain team, appreciate recs.  Controlled well today  - Diet: progress diet as tolerated  - Labs: hgb 10.3, transfuse if <7.0. No indication today  - Dressing: Keep dry and intact. Splint to remain on at all times  - Elevation: Elevate RLE on pillow to keep above the level of the heart as much as possible  - Follow-up: Outpatient follow up in 2 weeks  - Disposition: Anticipate discharge pending work with therapies, anticipate possible TCU need      Subjective:  Pain: moderate  Nausea, Vomiting:  No  Lightheadedness, Dizziness:  Yes  Neuro:  Patient denies new onset numbness or paresthesias  Fever, chills: No  Chest pain: No  SOB: No    Patient reports feeling well today. Patient reports pain is tolerable with current pain regimen. Patient eating and drinking well. Patient voiding and had BM this morning. All questions/concerns answered.      Objective:  /73 (BP Location: Right arm)   Pulse 85   Temp 98.3  F (36.8  C) (Oral)   Resp 16   Ht 1.6 m (5' 3\")   Wt 59 kg (130 lb)   LMP  (LMP Unknown)   SpO2 99%   BMI 23.03 kg/m      The patient is A&Ox3. Appears comfortable, sitting up in bed  Calves without tenderness, neg Ling's  Brisk capillary refill in the toes.   Pulses not palpable d/t splint  Right toes warm & well-perfused.  Sensation intact in toes  ROM: Appropriately flexes & extends all toes bilaterally.   Splint C/D/I        Pertinent Labs   Lab Results: personally reviewed.   Lab Results   Component Value Date    INR 0.96 08/10/2023    INR 1.00 08/10/2023     Lab Results   Component Value Date    " WBC 8.0 08/16/2023    HGB 10.3 (L) 08/16/2023    HCT 31.0 (L) 08/16/2023    MCV 95 08/16/2023     08/16/2023     Lab Results   Component Value Date     08/16/2023    CO2 26 08/16/2023         Report completed by:  ABHI TIERNEY PA-C  Date: 08/16/2023  [facility]

## 2023-08-16 NOTE — PLAN OF CARE
"Goal Outcome Evaluation:      Plan of Care Reviewed With: patient    Overall Patient Progress: improvingOverall Patient Progress: improving    Outcome Evaluation: Pt a/o, denies pain. Gave TUMS X1, received page order to make it TID. Using commode well X2 during evening. VSS.    BP (!) 151/85 (BP Location: Right arm)   Pulse 81   Temp 98  F (36.7  C) (Oral)   Resp 17   Ht 1.6 m (5' 3\")   Wt 59 kg (130 lb)   LMP  (LMP Unknown)   SpO2 94%   BMI 23.03 kg/m      Chace Jo RN    "

## 2023-08-16 NOTE — PROGRESS NOTES
"Care Management Follow Up    Length of Stay (days): 6    Expected Discharge Date: 08/17/2023     Concerns to be Addressed: discharge planning   Patient plan of care discussed at interdisciplinary rounds: Yes    Anticipated Discharge Disposition:       Anticipated Discharge Services:      Education Provided on the Discharge Plan:  Per Care Team   Patient/Family in Agreement with the Plan:      Referrals Placed by CM/SW:    Private pay costs discussed: Not applicable    Additional Information:  Chart reviewed    Cm updates:   Pt was agreeable to TCU. Pt lives in [place]. Prefers TCU closest to that area.    RNCM sent TCU referral to [facility] and [facility] for now. Pending.       Ortho following and pain team following.       2:36pm- Spoke to daughter Jaqueline. Jaqueline wanted to see if MA application could be started and pt is wondering as well. RNCM reached out to FSW to see if she could assist.      Social Hx:   \"Pt lives alone and is independent at baseline.  Family can provide transportation. Pt is non weight bearing on her R ankle.  She is in a lot of pain at this time.  She may need to go to TCU at discharge due to her non-weight bearing status. \"        Cm will continue to follow plan of care, review recommendations, and assist with any discharge needs anticipated.         Kimberley Ramirez RN      "

## 2023-08-16 NOTE — PLAN OF CARE
Problem: Pain Acute  Goal: Optimal Pain Control and Function  Intervention: Prevent or Manage Pain  Recent Flowsheet Documentation  Taken 8/16/2023 0045 by Sarah Maldonado RN  Medication Review/Management: medications reviewed   Goal Outcome Evaluation:  Pt having a good shift.  Sleeping soundly.  Rousing for short periods and enough to get up to the commode.  Reports full sensation in toes but still some lingering numbness in knee above splint and in the calf area.  Pain rated 2/10 before scheduled meds given.  Pt in good spirits.

## 2023-08-16 NOTE — PLAN OF CARE
Problem: Plan of Care - These are the overarching goals to be used throughout the patient stay.    Goal: Plan of Care Review  Description: The Plan of Care Review/Shift note should be completed every shift.  The Outcome Evaluation is a brief statement about your assessment that the patient is improving, declining, or no change.  This information will be displayed automatically on your shift note.  Outcome: Progressing   Goal Outcome Evaluation:       Educated pt on treatment plan, pt voiced understanding.      Problem: Pain Acute  Goal: Optimal Pain Control and Function  Intervention: Develop Pain Management Plan  Recent Flowsheet Documentation  Taken 8/16/2023 1020 by Henrietta Hernandez, RN  Pain Management Interventions:   medication (see MAR)   distraction   emotional support   pillow support provided   rest   repositioned  Taken 8/16/2023 0926 by Henrietta Hernandez RN  Pain Management Interventions:   medication (see MAR)   distraction   emotional support   pillow support provided   rest   repositioned   Pain to RLE controlled with sched/PRN pain meds. Cont. To elevate RLE with 2 pillows. Pt cont. To be NWB. Compliant.

## 2023-08-16 NOTE — PROGRESS NOTES
"[facility] ACUTE PAIN SERVICE    ([facility], [facility], [facility])   Daily PAIN Progress Note    Assessment/Plan:  Everett Dixon is a 63 year old female who was admitted on 8/10/2023.  1 Day Post-Op Open Reduction Internal Fixation of Right ankle fracture.    Pain Service is asked to see the patient for fracture pain, after fall with displaced bimalleolar ankle fracture with lateral talar shift and angulation - pt initially underwent closed reduction and splinting. Admitted for fall with fracture.  History of smoking, routine marijuana use, deppression, chronic gabapentin and baclofen use, anxiety, emphysema.     shows ongoing use of gabapentin. Describes pain as somewhat improved with some benefit with muscle relaxer.      In the last 24 hours received 3(4mg) hydromorhone about 48 MME.    1(50mcg) IV fentanyl perioperatively.        PLAN:  Acute pain secondary to fracture of ankle. Opioid naive.   Multimodal Medication Therapy:   NSAID'S: Hold on anticoagulation therapy  Muscle Relaxants: baclofen 10 mg po TID & added robaxin Q6h alternating   Adjuvants: APAP 975 mg q6h, gabapentin 700 mg po TID  Antidepressants/anxiolytics: buspar 15 mg BID  Opioids: continue Dilaudid 4 mg every 6 scheduled and 4 mg every 3 as needed  IV Pain medication: Dilaudid IV 6h prn   Constipation management: scheduled MiraLAX and continue senna  Discharge plan- Dilaudid per ortho. Suggest DXA scan. Likely dilaudid at the time of discharge       Principal Problem:    Closed bimalleolar fracture of right ankle, initial encounter  Active Problems:    Closed right ankle fracture     LOS: 6 days       Subjective:  Patient reports pain is starting to creep up had dilaudid prn dose about 1 hour ago.  2 mg pain is at about \"6-7\" due for scheduled 4 mg dilaudid.  Ankle starting to hurt and heel and bottom of foot is throbbing.  Does get relief from the Robaxin for ankle pain, and chronic side ache pain is improved.    BM " yesterday after fleets.  .     acetaminophen  975 mg Oral Q6H    aspirin  81 mg Oral BID    atorvastatin  20 mg Oral Daily    baclofen  10 mg Oral TID    betamethasone dipropionate   Topical BID    busPIRone  15 mg Oral BID    DULoxetine  60 mg Oral BID    fluticasone-vilanterol  1 puff Inhalation Daily    gabapentin  700 mg Oral TID    HYDROmorphone  4 mg Oral Q6H    Magnesium Oxide  250 mg Oral Daily    methocarbamol  500 mg Oral Q6H    nicotine  1 patch Transdermal Daily    nicotine   Transdermal Q8H    polyethylene glycol  17 g Oral Daily    senna-docusate  1 tablet Oral BID    sodium chloride (PF)  3 mL Intracatheter Q8H    sodium chloride (PF)  3 mL Intracatheter Q8H    Vitamin D3  25 mcg Oral Daily       Objective:  Vital signs in last 24 hours:  Temp:  [98  F (36.7  C)-98.6  F (37  C)] (P) 98.3  F (36.8  C)  Pulse:  [68-93] (P) 79  Resp:  [16-58] (P) 16  BP: (115-163)/(72-92) (P) 134/73  SpO2:  [85 %-100 %] (P) 95 %  Weight:   Weight change:   Body mass index is 23.03 kg/m .    Intake/Output last 3 shifts:  I/O last 3 completed shifts:  In: 2020 [P.O.:720; I.V.:1300]  Out: 550 [Urine:450; Blood:100]  Intake/Output this shift:  No intake/output data recorded.    Review of Systems:   As per subjective, all others negative.    Physical Exam:    General Appearance:  Alert, cooperative, rests in bed, mild/moderate uncomfortable appearing   Head:  Normocephalic, without obvious abnormality, atraumatic   Eyes:  PERRL, conjunctiva/corneas clear, EOM's intact   Nose: Nares normal, septum midline, mucosa normal, no drainage   Throat: Lips, mucosa, and tongue normal; teeth and gums normal   Neck: Supple, symmetrical, trachea midline   Back:   Symmetric, no curvature, ROM normal, no CVA tenderness   Lungs:   respirations unlabored   Abdomen:   Soft, non-tender, non distended   Extremities: Right lower extremity with splint inplace   Skin: Skin color, texture, turgor normal, no rashes or lesions   Neurologic: Alert and  "oriented X 3, Moves all 4 extremities          Imaging:  Personally Reviewed.  XR Ankle Port Right 2 Views    Result Date: 8/15/2023  EXAM: XR ANKLE PORT RIGHT 2 VIEWS LOCATION: [facility] DATE: 8/15/2023 INDICATION: Status post right bimalleolar open reduction internal fixation. COMPARISON: 8/13/2023.     IMPRESSION: Fixated distal tibia and fibular fractures with splint in place status post ORIF. Alignment has improved.     XR Surgery MINDI Fluoro L/T 5 Min    Result Date: 8/15/2023  This exam was marked as non-reportable because it will not be read by a radiologist or a [facility] non-radiologist provider.     POC US Guidance Needle Placement    Result Date: 8/15/2023  Ultrasound was performed as guidance to an anesthesia procedure.  Click \"PACS images\" hyperlink below to view any stored images.  For specific procedure details, view procedure note authored by anesthesia.    CT Ankle Right w/o Contrast    Result Date: 8/13/2023  EXAM: CT ANKLE RIGHT WITHOUT CONTRAST LOCATION: [facility] DATE: 08/13/2023 INDICATION: Right ankle pain. Recent fracture. COMPARISON: 08/02/2023 radiographs. TECHNIQUE: Noncontrast. Axial, sagittal and coronal thin-section reconstruction. Dose reduction techniques were used. FINDINGS: BONES: -Acute comminuted mildly displaced fracture of the lateral malleolus with mild lateral displacement and mild lateral angulation of the fracture fragments. Tiny chronic avulsion fracture of the tip of the lateral malleolus. Acute moderately displaced fracture of the medial malleolus. Tiny acute fracture of the lateral portion of the posterior malleolus. Small mildly displaced acute fracture of the anterolateral corner of the distal tibia as seen on series 3 image 38. There is mild lateral subluxation  of the talus with slight widening of the medial ankle mortise. The subtalar joints are intact and unremarkable. Tiny intra-articular fracture fragment in " the tibiotalar joint. SOFT TISSUES: -No muscle atrophy. Moderate soft tissue stranding around the ankle likely secondary to poorly defined blood products.     IMPRESSION: 1.  Acute comminuted fracture of the lateral malleolus. 2.  Acute moderately displaced transverse fracture of the medial malleolus. 3.  Small acute mildly displaced fracture of the lateral portion of the posterior malleolus. 4.  Small acute mildly displaced fracture of the anterolateral corner of the distal tibia. 5.  Mild lateral subluxation of the talus with mild widening of the medial ankle mortise.     XR Ankle Port Right 2 Views    Result Date: 8/12/2023  EXAM: XR ANKLE PORT RIGHT 2 VIEWS LOCATION: [facility] DATE: 8/12/2023 INDICATION: Increased pain with bimaleolar fracture after casting. COMPARISON: 08/10/2023.     IMPRESSION: Films taken through casting material obscure some bony detail. The fracture of the distal fibula is still visualized without change in alignment. A fracture at the medial malleolus is difficult to appreciate through the splinting material. No  change from the previous exam.    Ankle XR, G/E 3 views, right    Result Date: 8/10/2023  EXAM: XR ANKLE RIGHT G/E 3 VIEWS LOCATION: [facility] DATE: 8/10/2023 INDICATION: Post reduction   portable please COMPARISON: Radiographs from earlier today.     IMPRESSION: Closed reduction and splint replacement since the study from earlier today. There is improved position and alignment of the distal tibia and fibula fractures.     XR Chest 1 View    Result Date: 8/10/2023  EXAM: XR CHEST 1 VIEW LOCATION: [facility] DATE: 8/10/2023 INDICATION: Preop. COMPARISON: None.     IMPRESSION: Right paratracheal density which could represent vascular structure, however adenopathy not excluded. Mild low lung volumes accentuate heart size and pulmonary vascularity. Heart size and pulmonary vascularity within normal limits.  No focal lung infiltrates. No pneumothorax seen. Osseous structures grossly intact. Visualized upper abdomen unremarkable.    XR Tibia and Fibula Right 2 Views    Result Date: 8/10/2023  EXAM: XR ANKLE RIGHT G/E 3 VIEWS, XR TIBIA AND FIBULA RIGHT 2 VIEWS, XR FEMUR RIGHT 2 VIEWS, XR PELVIS AND HIP RIGHT 2 VIEWS LOCATION: [facility] DATE: 8/10/2023 INDICATION: trip and fall, deformity COMPARISON: None.     IMPRESSION: The bones are well-mineralized. The bony pelvis is intact. The femoral head and neck are intact. The hip joint is well aligned. The femur is intact. The included portions of the knee joint are well aligned. Overlying splinting material limits  evaluation of the ankle, there is a displaced bimalleolar ankle fracture with lateral talar shift and angulation. Soft tissue swelling surrounds the ankle. The remainder of the tibia and fibula are intact.    Ankle XR, G/E 3 views, right    Result Date: 8/10/2023  EXAM: XR ANKLE RIGHT G/E 3 VIEWS, XR TIBIA AND FIBULA RIGHT 2 VIEWS, XR FEMUR RIGHT 2 VIEWS, XR PELVIS AND HIP RIGHT 2 VIEWS LOCATION: [facility] DATE: 8/10/2023 INDICATION: trip and fall, deformity COMPARISON: None.     IMPRESSION: The bones are well-mineralized. The bony pelvis is intact. The femoral head and neck are intact. The hip joint is well aligned. The femur is intact. The included portions of the knee joint are well aligned. Overlying splinting material limits  evaluation of the ankle, there is a displaced bimalleolar ankle fracture with lateral talar shift and angulation. Soft tissue swelling surrounds the ankle. The remainder of the tibia and fibula are intact.    XR Pelvis and Hip Right 2 Views    Result Date: 8/10/2023  EXAM: XR ANKLE RIGHT G/E 3 VIEWS, XR TIBIA AND FIBULA RIGHT 2 VIEWS, XR FEMUR RIGHT 2 VIEWS, XR PELVIS AND HIP RIGHT 2 VIEWS LOCATION: [facility] DATE: 8/10/2023 INDICATION: trip and fall, deformity  COMPARISON: None.     IMPRESSION: The bones are well-mineralized. The bony pelvis is intact. The femoral head and neck are intact. The hip joint is well aligned. The femur is intact. The included portions of the knee joint are well aligned. Overlying splinting material limits  evaluation of the ankle, there is a displaced bimalleolar ankle fracture with lateral talar shift and angulation. Soft tissue swelling surrounds the ankle. The remainder of the tibia and fibula are intact.    XR Femur Right 2 Views    Result Date: 8/10/2023  EXAM: XR ANKLE RIGHT G/E 3 VIEWS, XR TIBIA AND FIBULA RIGHT 2 VIEWS, XR FEMUR RIGHT 2 VIEWS, XR PELVIS AND HIP RIGHT 2 VIEWS LOCATION: [facility] DATE: 8/10/2023 INDICATION: trip and fall, deformity COMPARISON: None.     IMPRESSION: The bones are well-mineralized. The bony pelvis is intact. The femoral head and neck are intact. The hip joint is well aligned. The femur is intact. The included portions of the knee joint are well aligned. Overlying splinting material limits  evaluation of the ankle, there is a displaced bimalleolar ankle fracture with lateral talar shift and angulation. Soft tissue swelling surrounds the ankle. The remainder of the tibia and fibula are intact.      Lab Results:  Personally Reviewed.   Recent Labs   Lab 08/16/23  0528 08/15/23  0529 08/10/23  0312   WBC 8.0 6.6 11.3*   HGB 10.3* 10.5* 10.9*   HCT 31.0* 32.1* 33.7*    323 305     Recent Labs   Lab 08/16/23  0528 08/15/23  0529 08/11/23  0526 08/10/23  0312 08/10/23  0245    138 138   < > 136   CO2 26 28 21*   < > 21*   BUN 10.6 13.2 9.1   < > 27.7*   ALBUMIN  --   --   --   --  3.8   ALKPHOS  --   --   --   --  51   ALT  --   --   --   --  15   AST  --   --   --   --  33    < > = values in this interval not displayed.     Recent Labs   Lab 08/10/23  0312 08/10/23  0245   INR 0.96 1.00         MANAGEMENT DISCUSSED with the following over the past 24 hours: RN    NOTE(S)/MEDICAL RECORDS REVIEWED over the past 24 hours: Ortho Surgery, Nursing, Hospital Medicine  Medical complexity over the past 24 hours:  - Prescription DRUG MANAGEMENT performed        Meenu AGGARWAL, CNS-BC, DNP  Acute Care Pain Management Program  [facility] ([place], [place])   Preference if for [employer] [facility] - [place]  Click HERE to page [place]

## 2023-08-16 NOTE — CONSULTS
"ACUPUNCTURIST TREATMENT NOTE    Name: Everett Dixon  :  1960  MRN:  [id number]    Acupuncture Treatment  Patient Type: Orthopedic  Intervention Reason: Pain  Pain Location: Low back  Pre-session Pain Ratin  Post-session Pain Ratin  Patient complaint:: Low back pain  Initial insertions: Yin almeida, (L) Johnson men, Li 10, (R) Si 3, (R) Ling gu, (L) Bl 60, 62, 65  Number of needles inserted: 9  Number of needles removed: 9         \"Risks and benefits of acupuncture were discussed with patient. Consent for treatment was given. We thank you for the referral.\"     Kwesi Fitch    Date:  2023  Time:  4:27 PM    "

## 2023-08-16 NOTE — PLAN OF CARE
"  Problem: Plan of Care - These are the overarching goals to be used throughout the patient stay.    Goal: Plan of Care Review  Description: The Plan of Care Review/Shift note should be completed every shift.  The Outcome Evaluation is a brief statement about your assessment that the patient is improving, declining, or no change.  This information will be displayed automatically on your shift note.  Outcome: Progressing  Flowsheets (Taken 8/16/2023 1719)  Plan of Care Reviewed With: patient  Goal: Patient-Specific Goal (Individualized)  Description: You can add care plan individualizations to a care plan. Examples of Individualization might be:  \"Parent requests to be called daily at 9am for status\", \"I have a hard time hearing out of my right ear\", or \"Do not touch me to wake me up as it startles me\".  Outcome: Progressing  Goal: Absence of Hospital-Acquired Illness or Injury  Outcome: Progressing  Intervention: Identify and Manage Fall Risk  Recent Flowsheet Documentation  Taken 8/16/2023 1630 by Juan Miguel Kay RN  Safety Promotion/Fall Prevention: clutter free environment maintained  Intervention: Prevent Skin Injury  Recent Flowsheet Documentation  Taken 8/16/2023 1630 by Juan Miguel Kay RN  Body Position: position changed independently  Goal: Optimal Comfort and Wellbeing  Outcome: Progressing  Intervention: Monitor Pain and Promote Comfort  Recent Flowsheet Documentation  Taken 8/16/2023 1630 by Juan Miguel Kay RN  Pain Management Interventions: medication (see MAR)  Goal: Readiness for Transition of Care  Outcome: Progressing     Problem: Pain Acute  Goal: Optimal Pain Control and Function  Outcome: Progressing  Intervention: Develop Pain Management Plan  Recent Flowsheet Documentation  Taken 8/16/2023 1630 by Juan Miguel Kay RN  Pain Management Interventions: medication (see MAR)   Goal Outcome Evaluation:      Plan of Care Reviewed With: patient                 "

## 2023-08-17 ENCOUNTER — APPOINTMENT (OUTPATIENT)
Dept: PHYSICAL THERAPY | Facility: HOSPITAL | Age: 63
DRG: 493 | End: 2023-08-17
Payer: COMMERCIAL

## 2023-08-17 LAB
HGB BLD-MCNC: 10.4 G/DL (ref 11.7–15.7)
MAGNESIUM SERPL-MCNC: 2.1 MG/DL (ref 1.7–2.3)
POTASSIUM SERPL-SCNC: 3.8 MMOL/L (ref 3.4–5.3)

## 2023-08-17 PROCEDURE — 99232 SBSQ HOSP IP/OBS MODERATE 35: CPT | Performed by: INTERNAL MEDICINE

## 2023-08-17 PROCEDURE — 120N000001 HC R&B MED SURG/OB

## 2023-08-17 PROCEDURE — 97530 THERAPEUTIC ACTIVITIES: CPT | Mod: GP | Performed by: PHYSICAL THERAPIST

## 2023-08-17 PROCEDURE — 97116 GAIT TRAINING THERAPY: CPT | Mod: GP | Performed by: PHYSICAL THERAPIST

## 2023-08-17 PROCEDURE — 250N000013 HC RX MED GY IP 250 OP 250 PS 637: Performed by: HOSPITALIST

## 2023-08-17 PROCEDURE — 250N000013 HC RX MED GY IP 250 OP 250 PS 637: Performed by: PHYSICIAN ASSISTANT

## 2023-08-17 PROCEDURE — 85018 HEMOGLOBIN: CPT

## 2023-08-17 PROCEDURE — 250N000013 HC RX MED GY IP 250 OP 250 PS 637: Performed by: CLINICAL NURSE SPECIALIST

## 2023-08-17 PROCEDURE — 250N000013 HC RX MED GY IP 250 OP 250 PS 637: Performed by: PAIN MEDICINE

## 2023-08-17 PROCEDURE — 84132 ASSAY OF SERUM POTASSIUM: CPT | Performed by: INTERNAL MEDICINE

## 2023-08-17 PROCEDURE — 83735 ASSAY OF MAGNESIUM: CPT | Performed by: INTERNAL MEDICINE

## 2023-08-17 PROCEDURE — 250N000011 HC RX IP 250 OP 636: Mod: JZ | Performed by: INTERNAL MEDICINE

## 2023-08-17 PROCEDURE — 99232 SBSQ HOSP IP/OBS MODERATE 35: CPT | Performed by: CLINICAL NURSE SPECIALIST

## 2023-08-17 PROCEDURE — 250N000013 HC RX MED GY IP 250 OP 250 PS 637

## 2023-08-17 PROCEDURE — 250N000013 HC RX MED GY IP 250 OP 250 PS 637: Performed by: STUDENT IN AN ORGANIZED HEALTH CARE EDUCATION/TRAINING PROGRAM

## 2023-08-17 PROCEDURE — 250N000013 HC RX MED GY IP 250 OP 250 PS 637: Performed by: INTERNAL MEDICINE

## 2023-08-17 PROCEDURE — 36415 COLL VENOUS BLD VENIPUNCTURE: CPT

## 2023-08-17 RX ORDER — HYDROXYZINE HYDROCHLORIDE 50 MG/1
50 TABLET, FILM COATED ORAL EVERY 4 HOURS PRN
Qty: 30 TABLET | Refills: 0 | Status: SHIPPED | OUTPATIENT
Start: 2023-08-17

## 2023-08-17 RX ORDER — HYDROMORPHONE HYDROCHLORIDE 4 MG/1
4 TABLET ORAL EVERY 6 HOURS
Qty: 28 TABLET | Refills: 0 | Status: SHIPPED | OUTPATIENT
Start: 2023-08-17 | End: 2023-08-24

## 2023-08-17 RX ORDER — GABAPENTIN 100 MG/1
700 CAPSULE ORAL 3 TIMES DAILY
Qty: 147 CAPSULE | Refills: 0 | Status: SHIPPED | OUTPATIENT
Start: 2023-08-17 | End: 2023-08-31

## 2023-08-17 RX ORDER — METHOCARBAMOL 500 MG/1
500 TABLET, FILM COATED ORAL EVERY 6 HOURS
Qty: 15 TABLET | Refills: 0 | Status: SHIPPED | OUTPATIENT
Start: 2023-08-17

## 2023-08-17 RX ADMIN — Medication 81 MG: at 10:06

## 2023-08-17 RX ADMIN — FLUTICASONE FUROATE AND VILANTEROL TRIFENATATE 1 PUFF: 100; 25 POWDER RESPIRATORY (INHALATION) at 10:06

## 2023-08-17 RX ADMIN — BACLOFEN 10 MG: 10 TABLET ORAL at 20:33

## 2023-08-17 RX ADMIN — POLYETHYLENE GLYCOL 3350 17 G: 17 POWDER, FOR SOLUTION ORAL at 10:06

## 2023-08-17 RX ADMIN — BETAMETHASONE DIPROPIONATE: 0.5 LOTION TOPICAL at 22:05

## 2023-08-17 RX ADMIN — ENOXAPARIN SODIUM 40 MG: 40 INJECTION SUBCUTANEOUS at 14:22

## 2023-08-17 RX ADMIN — Medication 1 MG: at 22:06

## 2023-08-17 RX ADMIN — ACETAMINOPHEN 975 MG: 325 TABLET ORAL at 00:49

## 2023-08-17 RX ADMIN — GABAPENTIN 700 MG: 100 CAPSULE ORAL at 10:05

## 2023-08-17 RX ADMIN — Medication 25 MCG: at 10:05

## 2023-08-17 RX ADMIN — DULOXETINE HYDROCHLORIDE 60 MG: 60 CAPSULE, DELAYED RELEASE PELLETS ORAL at 10:06

## 2023-08-17 RX ADMIN — ATORVASTATIN CALCIUM 20 MG: 10 TABLET, FILM COATED ORAL at 10:06

## 2023-08-17 RX ADMIN — BUSPIRONE HYDROCHLORIDE 15 MG: 15 TABLET ORAL at 20:33

## 2023-08-17 RX ADMIN — Medication 250 MG: at 10:07

## 2023-08-17 RX ADMIN — GABAPENTIN 700 MG: 100 CAPSULE ORAL at 14:22

## 2023-08-17 RX ADMIN — ACETAMINOPHEN 975 MG: 325 TABLET ORAL at 20:31

## 2023-08-17 RX ADMIN — DULOXETINE HYDROCHLORIDE 60 MG: 60 CAPSULE, DELAYED RELEASE PELLETS ORAL at 20:29

## 2023-08-17 RX ADMIN — METHOCARBAMOL 500 MG: 500 TABLET, FILM COATED ORAL at 22:05

## 2023-08-17 RX ADMIN — BACLOFEN 10 MG: 10 TABLET ORAL at 10:07

## 2023-08-17 RX ADMIN — ACETAMINOPHEN 975 MG: 325 TABLET ORAL at 14:22

## 2023-08-17 RX ADMIN — METHOCARBAMOL 500 MG: 500 TABLET, FILM COATED ORAL at 04:31

## 2023-08-17 RX ADMIN — ACETAMINOPHEN 975 MG: 325 TABLET ORAL at 07:23

## 2023-08-17 RX ADMIN — SENNOSIDES AND DOCUSATE SODIUM 2 TABLET: 50; 8.6 TABLET ORAL at 20:32

## 2023-08-17 RX ADMIN — HYDROMORPHONE HYDROCHLORIDE 4 MG: 4 TABLET ORAL at 16:35

## 2023-08-17 RX ADMIN — NICOTINE 1 PATCH: 14 PATCH, EXTENDED RELEASE TRANSDERMAL at 15:02

## 2023-08-17 RX ADMIN — Medication 81 MG: at 20:31

## 2023-08-17 RX ADMIN — METHOCARBAMOL 500 MG: 500 TABLET, FILM COATED ORAL at 16:35

## 2023-08-17 RX ADMIN — METHOCARBAMOL 500 MG: 500 TABLET, FILM COATED ORAL at 10:05

## 2023-08-17 RX ADMIN — HYDROMORPHONE HYDROCHLORIDE 4 MG: 4 TABLET ORAL at 10:07

## 2023-08-17 RX ADMIN — BACLOFEN 10 MG: 10 TABLET ORAL at 14:22

## 2023-08-17 RX ADMIN — SENNOSIDES AND DOCUSATE SODIUM 2 TABLET: 50; 8.6 TABLET ORAL at 10:06

## 2023-08-17 RX ADMIN — GABAPENTIN 700 MG: 100 CAPSULE ORAL at 20:27

## 2023-08-17 RX ADMIN — NICOTINE 1 PATCH: 14 PATCH, EXTENDED RELEASE TRANSDERMAL at 00:49

## 2023-08-17 RX ADMIN — BUSPIRONE HYDROCHLORIDE 15 MG: 15 TABLET ORAL at 10:08

## 2023-08-17 RX ADMIN — BETAMETHASONE DIPROPIONATE: 0.5 LOTION TOPICAL at 10:06

## 2023-08-17 RX ADMIN — HYDROMORPHONE HYDROCHLORIDE 4 MG: 4 TABLET ORAL at 04:31

## 2023-08-17 RX ADMIN — HYDROMORPHONE HYDROCHLORIDE 4 MG: 4 TABLET ORAL at 22:05

## 2023-08-17 RX ADMIN — HYDROMORPHONE HYDROCHLORIDE 2 MG: 2 TABLET ORAL at 14:22

## 2023-08-17 ASSESSMENT — ACTIVITIES OF DAILY LIVING (ADL)
ADLS_ACUITY_SCORE: 26
ADLS_ACUITY_SCORE: 24
ADLS_ACUITY_SCORE: 26
ADLS_ACUITY_SCORE: 26
ADLS_ACUITY_SCORE: 24
ADLS_ACUITY_SCORE: 26
ADLS_ACUITY_SCORE: 24
ADLS_ACUITY_SCORE: 26

## 2023-08-17 NOTE — PLAN OF CARE
Problem: Plan of Care - These are the overarching goals to be used throughout the patient stay.    Goal: Plan of Care Review  Description: The Plan of Care Review/Shift note should be completed every shift.  The Outcome Evaluation is a brief statement about your assessment that the patient is improving, declining, or no change.  This information will be displayed automatically on your shift note.  Outcome: Progressing   Goal Outcome Evaluation:       Educated pt on treatment plan, pt voiced understanding.      Problem: Pain Acute  Goal: Optimal Pain Control and Function  Intervention: Develop Pain Management Plan  Recent Flowsheet Documentation  Taken 8/17/2023 1007 by Henrietta Hernandez RN  Pain Management Interventions:   medication (see MAR)   distraction   emotional support   pillow support provided   rest   repositioned   Pain controlled with sched/PRN pain meds. Splint c/d/I to RLE. Cont. To elevate RLE. Remains NWB, compliant.

## 2023-08-17 NOTE — PROGRESS NOTES
CLINICAL NUTRITION SERVICES     Reviewed nutrition risk factors due to LOS. Pt is tolerating diet, eating well per nursing documentation. No nutrition issues identified at this time. RD will follow peripherally at this time, unless consulted.

## 2023-08-17 NOTE — PROGRESS NOTES
"Orthopedic Progress Note      Assessment: 2 Days Post-Op  S/P Procedure(s):  OPEN REDUCTION INTERNAL FIXATION, FRACTURE,RIGHT ANKLE     Plan:   - Continue PT/OT  - Weightbearing status: NWB RLE  - Activity: Up with assist and assistive device until independent.  - Anticoagulation: ASA 81 PO BID in addition to SCDs, terry stockings and early ambulation.  - Antibiotics: Ancef, complete  - Pain Management; Per pain team, appreciate recs.  Controlled well today  - Diet: progress diet as tolerated  - Labs: hgb 10.4, transfuse if <7.0. No indication today  - Dressing: Keep dry and intact. Splint to remain on at all times  - Elevation: Elevate RLE on pillow to keep above the level of the heart as much as possible  - Follow-up: Outpatient follow up in 2 weeks  - Disposition: Discharge TCU tomorrow.  Okay to discharge from an orthopedic standpoint.        Subjective:  Pain: moderate  Nausea, Vomiting:  No  Lightheadedness, Dizziness:  Yes  Neuro:  Patient denies new onset numbness or paresthesias  Fever, chills: No  Chest pain: No  SOB: No    Patient reports feeling well today. Patient reports pain is tolerable with current pain regimen. Patient eating and drinking well. Patient voiding and had BM this morning. All questions/concerns answered.      Objective:  /84 (BP Location: Left arm)   Pulse 81   Temp 97.9  F (36.6  C) (Oral)   Resp 17   Ht 1.6 m (5' 3\")   Wt 59 kg (130 lb)   LMP  (LMP Unknown)   SpO2 94%   BMI 23.03 kg/m      The patient is A&Ox3. Appears comfortable, sitting up in bed  Calves without tenderness, neg Ling's  Brisk capillary refill in the toes.   Pulses not palpable d/t splint  Right toes warm & well-perfused.  Sensation intact in toes  ROM: Appropriately flexes & extends all toes bilaterally.   Splint C/D/I        Pertinent Labs   Lab Results: personally reviewed.   Lab Results   Component Value Date    INR 0.96 08/10/2023    INR 1.00 08/10/2023     Lab Results   Component Value Date    WBC " 8.0 08/16/2023    HGB 10.4 (L) 08/17/2023    HCT 31.0 (L) 08/16/2023    MCV 95 08/16/2023     08/16/2023     Lab Results   Component Value Date     08/16/2023    CO2 26 08/16/2023         Report completed by:  ABHI TIERNEY PA-C  Date: 08/17/2023  [facility]

## 2023-08-17 NOTE — CONSULTS
"ACUPUNCTURIST TREATMENT NOTE    Name: Everett Dixon  :  1960  MRN:  [id number]    Acupuncture Treatment  Patient Type: Orthopedic  Intervention Reason: Pain, Pain 2  Pain Location: (R) ankle  Pre-session Pain Ratin  Post-session Pain Ratin  Pain 2 Location: (R) knee  Pre-session Pain 2 Ratin  Post-session Pain 2 Ratin  Patient complaint:: Pain of (R) knee and (R) ankle  Initial insertions: Yin almeida, (L) Johnson men, Li 4, (R) St 34, 36, (R) Sp 9, 10, (R) Angy 8, (R) Gb 34  Number of needles inserted: 10  Number of needles removed: 10         \"Risks and benefits of acupuncture were discussed with patient. Consent for treatment was given. We thank you for the referral.\"     Kwesi Fitch    Date:  2023  Time:  12:09 PM    "

## 2023-08-17 NOTE — PROGRESS NOTES
"Care Management Follow Up    Length of Stay (days): 7    Expected Discharge Date: 08/17/2023     Concerns to be Addressed: discharge planning   Patient plan of care discussed at interdisciplinary rounds: Yes    Anticipated Discharge Disposition:  TCU      Anticipated Discharge Services:    Anticipated Discharge DME:      Education Provided on the Discharge Plan:  Per Care Team   Patient/Family in Agreement with the Plan:  Yes    Referrals Placed by CM/SW:    Private pay costs discussed: Not applicable    Additional Information:  Chart reviewed.    Cm updates:  [facility] can accept pt tomorrow 8/18 anytime. Pts family will be here by 11:00am at the main entrance to transport pt. Family is on a time crunch tomorrow so would just appreciate pt being ready and down to main entrance by 11:00am if possible.       Per provider pt should be medically ready.  Other facilities declined.     PAS completed.       Social Hx:  \"Pt lives alone and is independent at baseline.  Family can provide transportation. Pt is non weight bearing on her R ankle.  She may need to go to TCU at discharge due to her non-weight bearing status. \"        Cm will continue to follow plan of care, review recommendations, and assist with any discharge needs anticipated.     Kimberley Ramirez RN      "

## 2023-08-17 NOTE — PLAN OF CARE
Problem: Plan of Care - These are the overarching goals to be used throughout the patient stay.    Goal: Plan of Care Review  Description: The Plan of Care Review/Shift note should be completed every shift.  The Outcome Evaluation is a brief statement about your assessment that the patient is improving, declining, or no change.  This information will be displayed automatically on your shift note.  Outcome: Progressing  Goal: Absence of Hospital-Acquired Illness or Injury  Intervention: Prevent Skin Injury  Recent Flowsheet Documentation  Taken 8/17/2023 0015 by Susie Bullock RN  Body Position: supine, legs elevated  Taken 8/16/2023 2015 by Susie Bullock RN  Body Position: supine, legs elevated  Goal: Readiness for Transition of Care  Outcome: Progressing   Goal Outcome Evaluation:Patient reports right leg pain of 8/10 to 6/10 dilaudid, tylenol,robaxin and leg elevated with two pillows given per order. Patient slept between cares and no nonverbal or physical signs of pain or observed.

## 2023-08-17 NOTE — PLAN OF CARE
"  Problem: Plan of Care - These are the overarching goals to be used throughout the patient stay.    Goal: Plan of Care Review  Description: The Plan of Care Review/Shift note should be completed every shift.  The Outcome Evaluation is a brief statement about your assessment that the patient is improving, declining, or no change.  This information will be displayed automatically on your shift note.  Outcome: Progressing  Flowsheets (Taken 8/17/2023 4101)  Plan of Care Reviewed With: patient  Goal: Patient-Specific Goal (Individualized)  Description: You can add care plan individualizations to a care plan. Examples of Individualization might be:  \"Parent requests to be called daily at 9am for status\", \"I have a hard time hearing out of my right ear\", or \"Do not touch me to wake me up as it startles me\".  Outcome: Progressing  Goal: Absence of Hospital-Acquired Illness or Injury  Outcome: Progressing  Intervention: Identify and Manage Fall Risk  Recent Flowsheet Documentation  Taken 8/17/2023 1635 by Juan Miguel Kay, RN  Safety Promotion/Fall Prevention: clutter free environment maintained  Goal: Readiness for Transition of Care  Outcome: Progressing     Problem: Pain Acute  Goal: Optimal Pain Control and Function  Outcome: Progressing  Intervention: Prevent or Manage Pain  Recent Flowsheet Documentation  Taken 8/17/2023 1635 by Juan Miguel Kay RN  Medication Review/Management: medications reviewed   Goal Outcome Evaluation:      Plan of Care Reviewed With: patient                 "

## 2023-08-17 NOTE — PROGRESS NOTES
[facility] ACUTE PAIN SERVICE    ([facility], [facility], [facility])   Daily PAIN Progress Note    Assessment/Plan:  Everett Dixon is a 63 year old female who was admitted on 8/10/2023.  2 Days Post-Op Open Reduction Internal Fixation of Right ankle fracture.    Pain Service is asked to see the patient for fracture pain, after fall with displaced bimalleolar ankle fracture with lateral talar shift and angulation - pt initially underwent closed reduction and splinting. Admitted for fall with fracture.  History of smoking, routine marijuana use, deppression, chronic gabapentin and baclofen use, anxiety, emphysema.     shows ongoing use of gabapentin. Describes pain as somewhat improved with some benefit with muscle relaxer.       In the last 24 hours received: scheduled tylenol 975 mg, hydromorphone 5(4mg) tablets and 1(2mg) prn     PLAN:  Acute pain secondary to fracture of ankle. Opioid naive.   Multimodal Medication Therapy:   NSAID'S: per Internal Medicine  Muscle Relaxants: baclofen 10 mg po TID & added robaxin Q6h alternating   Adjuvants: APAP 975 mg q6h, gabapentin 700 mg po TID  Antidepressants/anxiolytics: buspar 15 mg BID  Opioids: continue Dilaudid 4 mg every 6 scheduled and 4 mg every 3 as needed  IV Pain medication: Dilaudid IV 6h prn   Constipation management: scheduled MiraLAX and continue senna  Discharge plan- Dilaudid per ortho. (Please continue 4 mg every 6 hours for 4 days) with additional 2-4 mg tid prn  continue Robaxin 500 mg every 6 hours prn, Gabapentin 600 mg tid, baclofen (home medication)   Consider DXA scan.     Principal Problem:    Closed bimalleolar fracture of right ankle, initial encounter  Active Problems:    Closed right ankle fracture     LOS: 7 days       Subjective:  Patient reports pain is under good control.  Feels that the scheduled hydromorphone is the most helpful and would prefer to keep it on a schedule upon dismissal to TCU.  Really found the  acupuncture helpful.     acetaminophen  975 mg Oral Q6H    aspirin  81 mg Oral BID    atorvastatin  20 mg Oral Daily    baclofen  10 mg Oral TID    betamethasone dipropionate   Topical BID    busPIRone  15 mg Oral BID    DULoxetine  60 mg Oral BID    enoxaparin ANTICOAGULANT  40 mg Subcutaneous Q24H    fluticasone-vilanterol  1 puff Inhalation Daily    gabapentin  700 mg Oral TID    HYDROmorphone  4 mg Oral Q6H    Magnesium Oxide  250 mg Oral Daily    methocarbamol  500 mg Oral Q6H    nicotine  1 patch Transdermal Daily    nicotine   Transdermal Q8H    polyethylene glycol  17 g Oral Daily    senna-docusate  2 tablet Oral BID    sodium chloride (PF)  3 mL Intracatheter Q8H    Vitamin D3  25 mcg Oral Daily       Objective:  Vital signs in last 24 hours:  Temp:  [97.9  F (36.6  C)-98.5  F (36.9  C)] 97.9  F (36.6  C)  Pulse:  [81-86] 81  Resp:  [17-18] 17  BP: (118-152)/(71-85) 131/84  SpO2:  [91 %-98 %] 94 %  Weight:   Weight change:   Body mass index is 23.03 kg/m .    Intake/Output last 3 shifts:  I/O last 3 completed shifts:  In: 480 [P.O.:480]  Out: -   Intake/Output this shift:  I/O this shift:  In: 240 [P.O.:240]  Out: -     Review of Systems:   As per subjective, all others negative.    Physical Exam:    General Appearance:  Alert, cooperative, sitting up in bed   Head:  Normocephalic, without obvious abnormality, atraumatic   Eyes:  PERRL, conjunctiva/corneas clear, EOM's intact   Nose: Nares normal, septum midline, mucosa normal, no drainage   Throat: Lips, mucosa, and tongue normal; teeth and gums normal   Neck: Supple, symmetrical, trachea midline,   Back:   Symmetric, no curvature, ROM normal, no CVA tenderness   Lungs:   Respirations unlabored   Abdomen:   Soft, non-tender   Extremities: Extremities normal, right splint in place   Skin: Skin color, texture, turgor normal, no rashes or lesions   Neurologic: Alert and oriented X 3, Moves all 4 extremities          Imaging:  Reviewed      Lab  Results:  Reviewed.   Recent Labs   Lab 08/17/23  0525 08/16/23  0528 08/15/23  0529   WBC  --  8.0 6.6   HGB 10.4* 10.3* 10.5*   HCT  --  31.0* 32.1*   PLT  --  318 323     Recent Labs   Lab 08/16/23  0528 08/15/23  0529 08/11/23  0526    138 138   CO2 26 28 21*   BUN 10.6 13.2 9.1     No results for input(s): INR in the last 168 hours.      MANAGEMENT DISCUSSED with the following over the past 24 hours: RN and MD   NOTE(S)/MEDICAL RECORDS REVIEWED over the past 24 hours: Ortho, Hospital Medicine, Nursing  Medical complexity over the past 24 hours:  - Prescription DRUG MANAGEMENT performed        Meenu AGGARWAL, CNS-BC, DNP  Acute Care Pain Management Program  [facility] ([place], [place])   Preference if for [employer] Paging - Anupam  Click HERE to page Monique

## 2023-08-17 NOTE — PROGRESS NOTES
[facility]    Medicine Progress Note - Hospitalist Service    Date of Admission:  8/10/2023    Assessment & Plan   Everett Dixon is a 63 year old female admitted on 8/10/2023. She presented to the ER after a mechanical fall accident.  Right ankle x-ray with displaced bimalleolar ankle fracture with lateral talar shift and angulation.     Mechanical fall,   Right ankle fracture with dislocation status post repair-POD 1  -Sustained a mechanical fall accident in her niece's house while getting out of bathroom she was tipped and fell.  Right ankle x-ray significant for displaced bimalleolar ankle fracture  -Had a remote history of left ankle fracture in 2003  -CT 8/13: Acute comminuted fracture of the lateral malleolus, acute moderately displaced transverse fracture of the medial malleolus, small acute mildly displaced fracture of the lateral portion of the posterior malleolus, small acute mildly displaced fracture of the anterior lateral corner of the distal tibia, mild lateral subluxation of the talus with mild widening of the ankle mortise  -Pain team consulted, appreciate recommendations: Given a dose of Toradol today and will start celecoxib 200 mg at bedtime, continue baclofen, gabapentin, BuSpar, adding Robaxin, continue oral Dilaudid, and decrease IV Dilaudid to every 6 hours as needed, starting bowel regimen  -NWB in RLE and aggressive elevation  -[facility] consulted: Had surgery today     Active smoker  Cannabis use  -UDS positive for cannabinoids on admission  -Counseling, nicotine patch or gum    Elevated BP with no diagnosis of hypertension  -Manage pain adequately.  -Hydralazine prn ordered.    Mild MARVIN - resolved    HypoK - replete per protocol           Diet: Advance Diet as Tolerated: Regular Diet Adult  Discharge Instruction - Regular Diet Adult    DVT Prophylaxis: Enoxaparin (Lovenox) SQ  [place]mond Catheter: Not present  Lines: None     Cardiac Monitoring: None  Code Status: Full  Code      Clinically Significant Risk Factors                                    Disposition Plan      Expected Discharge Date: 08/17/2023    Discharge Delays: Placement - TCU  Procedure Pending (enter procedure & time in comments)  Destination: home;home with family  Discharge Comments: surgery tuesday          Alia Fletcher MD  Hospitalist Service  [facility]  Securely message with [employer] (more info)  Text page via [employer] Paging/Directory   ______________________________________________________________________    Interval History   Mrs. Dixon is doing well today. I saw her briefly. Planning for TCU at discharge.    Physical Exam   Vital Signs: Temp: 98.2  F (36.8  C) Temp src: Oral BP: (!) 152/85 Pulse: 84   Resp: 17 SpO2: 96 % O2 Device: None (Room air) Oxygen Delivery: 2 LPM  Weight: 130 lbs 0 oz    Did not examine.     Medical Decision Making       30 MINUTES SPENT BY ME on the date of service doing chart review, history, exam, documentation & further activities per the note.      Data     I have personally reviewed the following data over the past 24 hrs:    8.0  \   10.3 (L)   / 318     137 101 10.6 /  98   3.9 26 0.69 \       Imaging results reviewed over the past 24 hrs:   No results found for this or any previous visit (from the past 24 hour(s)).

## 2023-08-18 ENCOUNTER — APPOINTMENT (OUTPATIENT)
Dept: OCCUPATIONAL THERAPY | Facility: HOSPITAL | Age: 63
DRG: 493 | End: 2023-08-18
Payer: COMMERCIAL

## 2023-08-18 VITALS
TEMPERATURE: 98 F | BODY MASS INDEX: 23.04 KG/M2 | HEART RATE: 75 BPM | WEIGHT: 130 LBS | HEIGHT: 63 IN | DIASTOLIC BLOOD PRESSURE: 89 MMHG | OXYGEN SATURATION: 94 % | SYSTOLIC BLOOD PRESSURE: 138 MMHG | RESPIRATION RATE: 18 BRPM

## 2023-08-18 LAB
MAGNESIUM SERPL-MCNC: 1.8 MG/DL (ref 1.7–2.3)
POTASSIUM SERPL-SCNC: 3.7 MMOL/L (ref 3.4–5.3)

## 2023-08-18 PROCEDURE — 97110 THERAPEUTIC EXERCISES: CPT | Mod: GO

## 2023-08-18 PROCEDURE — 99239 HOSP IP/OBS DSCHRG MGMT >30: CPT | Performed by: INTERNAL MEDICINE

## 2023-08-18 PROCEDURE — 84132 ASSAY OF SERUM POTASSIUM: CPT | Performed by: INTERNAL MEDICINE

## 2023-08-18 PROCEDURE — 36415 COLL VENOUS BLD VENIPUNCTURE: CPT | Performed by: INTERNAL MEDICINE

## 2023-08-18 PROCEDURE — 250N000013 HC RX MED GY IP 250 OP 250 PS 637

## 2023-08-18 PROCEDURE — 250N000013 HC RX MED GY IP 250 OP 250 PS 637: Performed by: PAIN MEDICINE

## 2023-08-18 PROCEDURE — 250N000013 HC RX MED GY IP 250 OP 250 PS 637: Performed by: HOSPITALIST

## 2023-08-18 PROCEDURE — 83735 ASSAY OF MAGNESIUM: CPT | Performed by: INTERNAL MEDICINE

## 2023-08-18 PROCEDURE — 250N000013 HC RX MED GY IP 250 OP 250 PS 637: Performed by: CLINICAL NURSE SPECIALIST

## 2023-08-18 PROCEDURE — 97535 SELF CARE MNGMENT TRAINING: CPT | Mod: GO

## 2023-08-18 PROCEDURE — 250N000013 HC RX MED GY IP 250 OP 250 PS 637: Performed by: STUDENT IN AN ORGANIZED HEALTH CARE EDUCATION/TRAINING PROGRAM

## 2023-08-18 PROCEDURE — 250N000013 HC RX MED GY IP 250 OP 250 PS 637: Performed by: PHYSICIAN ASSISTANT

## 2023-08-18 RX ADMIN — HYDROMORPHONE HYDROCHLORIDE 4 MG: 2 TABLET ORAL at 00:30

## 2023-08-18 RX ADMIN — DULOXETINE HYDROCHLORIDE 60 MG: 60 CAPSULE, DELAYED RELEASE PELLETS ORAL at 08:46

## 2023-08-18 RX ADMIN — SENNOSIDES AND DOCUSATE SODIUM 2 TABLET: 50; 8.6 TABLET ORAL at 08:46

## 2023-08-18 RX ADMIN — Medication 81 MG: at 08:46

## 2023-08-18 RX ADMIN — BETAMETHASONE DIPROPIONATE: 0.5 LOTION TOPICAL at 08:47

## 2023-08-18 RX ADMIN — METHOCARBAMOL 500 MG: 500 TABLET, FILM COATED ORAL at 04:34

## 2023-08-18 RX ADMIN — BUSPIRONE HYDROCHLORIDE 15 MG: 15 TABLET ORAL at 08:46

## 2023-08-18 RX ADMIN — CALCIUM CARBONATE (ANTACID) CHEW TAB 500 MG 500 MG: 500 CHEW TAB at 00:30

## 2023-08-18 RX ADMIN — Medication 250 MG: at 08:46

## 2023-08-18 RX ADMIN — ACETAMINOPHEN 975 MG: 325 TABLET ORAL at 08:46

## 2023-08-18 RX ADMIN — FLUTICASONE FUROATE AND VILANTEROL TRIFENATATE 1 PUFF: 100; 25 POWDER RESPIRATORY (INHALATION) at 08:47

## 2023-08-18 RX ADMIN — ATORVASTATIN CALCIUM 20 MG: 10 TABLET, FILM COATED ORAL at 08:46

## 2023-08-18 RX ADMIN — POLYETHYLENE GLYCOL 3350 17 G: 17 POWDER, FOR SOLUTION ORAL at 08:45

## 2023-08-18 RX ADMIN — HYDROMORPHONE HYDROCHLORIDE 4 MG: 4 TABLET ORAL at 04:34

## 2023-08-18 RX ADMIN — GABAPENTIN 700 MG: 100 CAPSULE ORAL at 08:45

## 2023-08-18 RX ADMIN — BACLOFEN 10 MG: 10 TABLET ORAL at 08:46

## 2023-08-18 RX ADMIN — HYDROMORPHONE HYDROCHLORIDE 4 MG: 4 TABLET ORAL at 09:51

## 2023-08-18 RX ADMIN — Medication 25 MCG: at 08:46

## 2023-08-18 RX ADMIN — METHOCARBAMOL 500 MG: 500 TABLET, FILM COATED ORAL at 09:50

## 2023-08-18 ASSESSMENT — ACTIVITIES OF DAILY LIVING (ADL)
ADLS_ACUITY_SCORE: 24

## 2023-08-18 NOTE — PROGRESS NOTES
Care Management Discharge Note    Discharge Date: 08/18/2023       Discharge Disposition:  [facility] TCU    Discharge Services:      Discharge DME:      Discharge Transportation: family will provide, will  pt at 11 AM     Private pay costs discussed: Not applicable    Does the patient's insurance plan have a 3 day qualifying hospital stay waiver?  No    PAS Confirmation Code: [id number]  Patient/family educated on Medicare website which has current facility and service quality ratings:  yes    Education Provided on the Discharge Plan:  yes  Persons Notified of Discharge Plans: Facility, MD, nursing  Patient/Family in Agreement with the Plan:      Handoff Referral Completed: Yes    Additional Information:  Pt accepted at [facility],  verified with [facility] admissions plan to admit today. Nursing updated with time and plan. Writer verified nursing will have pt at door at 11 AM    RICHARD Schroeder

## 2023-08-18 NOTE — PLAN OF CARE
Physical Therapy Discharge Summary    Reason for therapy discharge:    Discharged to transitional care facility.    Progress towards therapy goal(s). See goals on Care Plan in [facility] electronic health record for goal details.  Goals partially met.  Barriers to achieving goals:   discharge from facility.    Therapy recommendation(s):    Continued therapy is recommended.  Rationale/Recommendations:  recommend continued PT at TCU.    Rebecca Webb, PT  8/18/2023

## 2023-08-18 NOTE — PROGRESS NOTES
[facility]    Medicine Progress Note - Hospitalist Service    Date of Admission:  8/10/2023    Assessment & Plan   Everett Dixon is a 63 year old female admitted on 8/10/2023. She presented to the ER after a mechanical fall accident.  Right ankle x-ray with displaced bimalleolar ankle fracture with lateral talar shift and angulation.     Mechanical fall,   Right ankle fracture with dislocation status post repair-POD 2  -Sustained a mechanical fall accident in her niece's house while getting out of bathroom she was tipped and fell.  Right ankle x-ray significant for displaced bimalleolar ankle fracture  -Had a remote history of left ankle fracture in 2003  -CT 8/13: Acute comminuted fracture of the lateral malleolus, acute moderately displaced transverse fracture of the medial malleolus, small acute mildly displaced fracture of the lateral portion of the posterior malleolus, small acute mildly displaced fracture of the anterior lateral corner of the distal tibia, mild lateral subluxation of the talus with mild widening of the ankle mortise  -Pain team consulted  -NWB in RLE and aggressive elevation  -[facility] consulted     Active smoker  Cannabis use  -UDS positive for cannabinoids on admission  -Counseling, nicotine patch or gum    Elevated BP with no diagnosis of hypertension  -Manage pain adequately.  -Hydralazine prn ordered.    Mild MARVIN - resolved    HypoK - replete per protocol     Diet: Advance Diet as Tolerated: Regular Diet Adult  Discharge Instruction - Regular Diet Adult    DVT Prophylaxis: Pneumatic Compression Devices  [place] Catheter: Not present  Lines: None     Cardiac Monitoring: None  Code Status: Full Code      Clinically Significant Risk Factors                                    Disposition Plan      Expected Discharge Date: 08/18/2023, 11:00 AM  Discharge Delays: Placement - TCU  *Early Discharge Anticipated  Destination: home;home with family  Discharge Comments: [facility] 8/18. Family transporting at 11:00am          Alia Fletcher MD  Hospitalist Service  [facility]  Securely message with [employer] (more info)  Text page via [employer] Paging/Directory   ______________________________________________________________________    Interval History   Mrs. Dixon is doing well today. She had acupuncture prior to my visit and she said it was great. Her pain was controlled after the visit. She will go to [facility] tomorrow.     Physical Exam   Vital Signs: Temp: 97.7  F (36.5  C) Temp src: Oral BP: 127/80 Pulse: 76   Resp: 18 SpO2: 97 % O2 Device: None (Room air)    Weight: 130 lbs 0 oz    General Appearance: Awake, alert, in no acute distress  Respiratory: CTAB, no wheeze  Cardiovascular: RRR, no murmur noted  GI: soft, nontender, non distended, normal bowel sounds  Skin: no jaundice, no rash      Medical Decision Making       35 MINUTES SPENT BY ME on the date of service doing chart review, history, exam, documentation & further activities per the note.      Data     I have personally reviewed the following data over the past 24 hrs:    N/A  \   10.4 (L)   / N/A     N/A N/A N/A /  N/A   3.8 N/A N/A \       Imaging results reviewed over the past 24 hrs:   No results found for this or any previous visit (from the past 24 hour(s)).

## 2023-08-18 NOTE — PLAN OF CARE
Goal Outcome Evaluation:      Problem: Pain Acute  Goal: Optimal Pain Control and Function  Intervention: Prevent or Manage Pain  Recent Flowsheet Documentation  Taken 8/18/2023 1746 by Desiree Nieves RN  Bowel Elimination Promotion:   adequate fluid intake promoted   ambulation promoted  Medication Review/Management: medications reviewed     Problem: Plan of Care - These are the overarching goals to be used throughout the patient stay.    Goal: Readiness for Transition of Care  Outcome: Adequate for Care Transition       Patient is pleasant, cooperative, alert and oriented x 4.  Pain managed with scheduled meds and ice packs.  Surgical dressing CDI.  Good sensation to BLE.  Discharging to TCU with family providing transportation.  All questions answered and belongings returned.

## 2023-08-18 NOTE — PLAN OF CARE
Problem: Plan of Care - These are the overarching goals to be used throughout the patient stay.    Goal: Plan of Care Review  Description: The Plan of Care Review/Shift note should be completed every shift.  The Outcome Evaluation is a brief statement about your assessment that the patient is improving, declining, or no change.  This information will be displayed automatically on your shift note.  Outcome: Progressing     Problem: Pain Acute  Goal: Optimal Pain Control and Function  Outcome: Progressing   Goal Outcome Evaluation:       Pt a&o, able to make needs known. Dilaudid 4mg p.o., robaxin 500mg given for pain on Rt ankle  with relief. Up to bedside commode, standby assist. Pt pivots good with non wt bearing on Rt leg, wrap with Ace wrap. CMS good. On room air, VSS. Will continue to monitor.

## 2023-08-18 NOTE — PLAN OF CARE
Occupational Therapy Discharge Summary    Reason for therapy discharge:    Discharged to transitional care facility.    Progress towards therapy goal(s). See goals on Care Plan in [facility] electronic health record for goal details.  Goals met    Therapy recommendation(s):    Recommend continued therapy at TCU to address higher level ADLs/IADLs

## 2023-08-18 NOTE — DISCHARGE SUMMARY
[facility]  Hospitalist Discharge Summary      Date of Admission:  8/10/2023  Date of Discharge:  8/18/2023  Discharging Provider: Sky Evans MD  Discharge Service: Hospitalist Service    Discharge Diagnoses   Right ankle fracture s/p mechanical fall  Active smoker  Elevated BP with no diagnosis of hypertension  Mild MARVIN resolved  Hypokalemia resolved   COPD/asthma  GERD continue home medication  Clinically Significant Risk Factors          Follow-ups Needed After Discharge       Unresulted Labs Ordered in the Past 30 Days of this Admission       No orders found from 7/11/2023 to 8/11/2023.        These results will be followed up by not applicable    Discharge Disposition   Discharged to short-term care facility  Condition at discharge: Stable    Hospital Course    elizabeth Dixon is a 63 year old female admitted on 8/10/2023. She presented to the ER after a mechanical fall accident.  Right ankle x-ray with displaced bimalleolar ankle fracture with lateral talar shift and angulation.     Mechanical fall,   Right ankle fracture with dislocation status post repair-POD 3  --S/p ORIF  -Sustained a mechanical fall accident in her niece's house while getting out of bathroom she was tipped and fell.  Right ankle x-ray significant for displaced bimalleolar ankle fracture  -Had a remote history of left ankle fracture in 2003  -CT 8/13: Acute comminuted fracture of the lateral malleolus, acute moderately displaced transverse fracture of the medial malleolus, small acute mildly displaced fracture of the lateral portion of the posterior malleolus, small acute mildly displaced fracture of the anterior lateral corner of the distal tibia, mild lateral subluxation of the talus with mild widening of the ankle mortise  -Pain team consulted  -NWB in RLE and aggressive elevation  -[facility] recommended aspirin for DVT prophylaxis.     Active smoker  Cannabis use  -UDS positive for cannabinoids on  "admission  -Counseling, nicotine patch or gum     Elevated BP with no diagnosis of hypertension  -Manage pain adequately.  -Hydralazine prn ordered.     Mild MARVIN - resolved     HypoK - replete per protocol    COPD/asthma  -- Continue home medication does not appear to be in exacerbation.    Consultations This Hospital Stay   PAIN MANAGEMENT ADULT IP CONSULT  PAIN MANAGEMENT ADULT IP CONSULT  SMOKING CESSATION PROGRAM IP CONSULT  PHYSICAL THERAPY ADULT IP CONSULT  OCCUPATIONAL THERAPY ADULT IP CONSULT  ORTHOPEDIC SURGERY IP CONSULT  ACUPUNCTURE IP CONSULT  INTEGRATIVE THERAPIES CONSULT  PHYSICAL THERAPY ADULT IP CONSULT  PHYSICAL THERAPY ADULT IP CONSULT  OCCUPATIONAL THERAPY ADULT IP CONSULT  OCCUPATIONAL THERAPY ADULT IP CONSULT    Code Status   Full Code    Time Spent on this Encounter   I, Sky Evans MD, personally saw the patient today and spent greater than 30 minutes discharging this patient.       Sky Evans MD  [place] [id number]  Phone: [phone]  Fax: [phone]  ______________________________________________________________________    Physical Exam   Vital Signs: Temp: 98  F (36.7  C) Temp src: Oral BP: 138/89 Pulse: 75   Resp: 18 SpO2: 94 % O2 Device: None (Room air)    Weight: 130 lbs 0 oz  In mild distress due to pain  Lungs are clear to auscultation.       Primary Care Physician   Physician No Ref-Primary    Discharge Orders      Splint / Cast    Do NOT remove your splint/cast.  Keep your splint/cast clean and dry.  If your splint/cast gets wet, contact your surgeon team.     Reason for your hospital stay    S/p right ankle ORIF     When to call - Contact Surgeon Team    You may experience symptoms that require follow-up before your scheduled appointment. Refer to the \"Stoplight Tool\" for instructions on when to contact your Surgeon Team if you are concerned about pain control, blood clots, constipation, or if you are unable " to urinate.     When to call - Reach out to Urgent Care    If you are not able to reach your Surgeon Team and you need immediate care, go to the Orthopedic Walk-in Clinic or Urgent Care at your Surgeon's office.  Do NOT go to the Emergency Room unless you have shortness of breath, chest pain, or other signs of a medical emergency.     When to call - Reasons to Call 911    Call 911 immediately if you experience sudden-onset chest pain, arm weakness/numbness, slurred speech, or shortness of breath     Discharge Instruction - Breathing exercises    Perform breathing exercises using your Incentive Spirometer 10 times per hour while awake for 2 weeks.     Symptoms - Fever Management    A low grade fever can be expected after surgery.  Use acetaminophen (TYLENOL) as needed for fever management.  Contact your Surgeon Team if you have a fever greater than 101.5 F, chills, and/or night sweats.     Symptoms - Constipation management    Constipation (hard, dry bowel movements) is expected after surgery due to the combination of being less active, the anesthetic, and the opioid pain medication.  You can do the following to help reduce constipation:  ~  FLUIDS:  Drink clear liquids (water or Gatorade), or juice (apple/prune).  ~  DIET:  Eat a fiber rich diet.    ~  ACTIVITY:  Get up and move around several times a day.  Increase your activity as you are able.  MEDICATIONS:  Reduce the risk of constipation by starting medications before you are constipated.  You can take Miralax   (1 packet as directed) and/or a stool softener (Senokot 1-2 tablets 1-2 times a day).  If you already have constipation and these medications are not working, you can get magnesium citrate and use as directed.  If you continue to have constipation you can try an over the counter suppository or enema.  Call your Surgeon Team if it has been greater than 3 days since your last bowel movement.     Symptoms - Reduced Urine Output    Changes in the amount of  fluids you drank before and after surgery may result in problems urinating.  It is important to stay well-hydrated after surgery and drink plenty of water. If it has been greater than 8 hours since you have urinated despite drinking plenty of water, call your Surgeon Team.     Activity - Exercises to prevent blood clots    Unless otherwise directed by your Surgeon team, perform the following exercises at least three times per day for the first four weeks after surgery to prevent blood clots in your legs: 1) Point and flex your feet (Ankle Pumps), 2) Move your ankle around in big circles, 3) Wiggle your toes, 4) Walk, even for short distances, several times a day, will help decrease the risk of blood clots.     Comfort and Pain Management - Pain after Surgery    Pain after surgery is normal and expected.  You will have some amount of pain for several weeks after surgery.  Your pain will improve with time.  There are several things you can do to help reduce your pain including: rest, ice, elevation, and using pain medications as needed. Contact your Surgeon Team if you have pain that persists or worsens after surgery despite rest, ice, elevation, and taking your medication(s) as prescribed. Contact your Surgeon Team if you have new numbness, tingling, or weakness in your operative extremity.     Comfort and Pain Management - Swelling after Surgery    Swelling and/or bruising of the surgical extremity is common and may persist for several months after surgery. In addition to frequent icing and elevation, gentle compressive support with an ACE wrap or tubigrip may help with swelling. Apply compression regularly, removing at least twice daily to perform skin checks. Contact your Surgeon Team if your swelling increases and is NOT associated with an increase in your activity level, or if your swelling increases and is associated with redness and pain.     Comfort and Pain Management - Cold therapy    Ice can be used to  control swelling and discomfort after surgery. Place a thin towel over your operative site and apply the ice pack overtop. Leave ice pack in place for 20 minutes, then remove for 20 minutes. Repeat this 20 minutes on/20 minutes off routine as often as tolerated.     Medication Instructions - Acetaminophen (TYLENOL) Instructions    You were discharged with acetaminophen (TYLENOL) for pain management after surgery. Acetaminophen most effectively manages pain symptoms when it is taken on a schedule without missing doses (every four, six, or eight hours). Your Provider will prescribe a safe daily dose between 3000 - 4000 mg.  Do NOT exceed this daily dose. Most patients use acetaminophen for pain control for the first four weeks after surgery.  You can wean from this medication as your pain decreases.     Medication Instructions - Opioids - Tapering Instructions    In the first three days following surgery, your symptoms may warrant use of the narcotic pain medication every four to six hours as prescribed. This is normal. As your pain symptoms improve, focus your efforts on decreasing (tapering) use of narcotic medications. The most successful tapering strategy is to first, decrease the number of tablets you take every 4-6 hours to the minimum prescribed. Then, increase the amount of time between doses.  For example:  First, taper to   or 1 tablet every 4-6 hours.  Then, taper to   or 1 tablet every 6-8 hours.  Then, taper to   or 1 tablet every 8-10 hours.  Then, taper to   or 1 tablet every 10-12 hours.  Then, taper to   or 1 tablet at bedtime.  The bedtime dose can help with comfort during sleep and is typically the last dose to be discontinued after surgery.     Follow Up Care    Please follow-up with Dr. Santiago's team in 2 weeks at [facility]. Call our scheduling line at [phone] to make an appointment, if you do not already have one scheduled.     Medication instructions -  Anticoagulation - aspirin     "Take the aspirin as prescribed for a total of four weeks after surgery.  This is given to help minimize your risk of blood clot.     Comfort and Pain Management - LOWER Extremity Elevation    Swelling is expected for several months after surgery. This type of swelling is usually associated with gravity and activity, and can be improved with elevation.   The best way to do this is to get your \"toes above your nose\" by laying down and placing several pillows lengthwise under your calf and heel. When elevating your leg keep your knee completely straight. Perform this elevation as often as possible especially for the first two weeks after surgery.     Medication Instructions - Opioid Instructions (1 - 2 tablets Q 4-6 hours, MAX 6 tablets)    You were discharged with an opioid medication (hydromorphone, oxycodone, hydrocodone, or tramadol). This medication should only be taken for breakthrough pain that is not controlled with acetaminophen (TYLENOL). If you rate your pain less than 3 you do not need this medication.  Pain rating 0-3:  You do not need this medication.  Pain rating 4-6:  Take 1 tablet every 4-6 hours as needed  Pain rating 7-10:  Take 2 tablets every 4-6 hours as needed.  Do not exceed 6 tablets per day     NO range of motion     Return to Driving    Return to driving - Driving is NOT permitted until directed by your provider. Under no circumstance are you permitted to drive while using narcotic pain medications.     NO Precautions    No precautions directed by your Provider.  You may perform range of motion activities as tolerated.     NO weight bearing    No weight bearing on your operative extremity.     Dressing / Wound Care - Wound    You have a clean dressing on your surgical wound. Dressing change instructions as follows: dressing will be removed at your follow-up appointment. Contact your Surgeon Team if you have increased redness, warmth around the surgical wound, and/or drainage from the surgical " wound.     Dressing / Wound care - Shower with wound/dressing covered    You must COVER your dressing or incision with saran wrap (or any other non-permeable covering) to allow the incision to remain dry while showering.  You may shower 2 days after surgery as long as the surgical wound stays dry. Continue to cover your dressing or incision for showering until your first office visit.  You are strictly prohibited from soaking or submerging the surgical wound underwater.     Dressing / Wound Care - NO Tub Bathing    Tub bathing, swimming, or any other activities that will cause your incision to be submerged in water should be avoided until allowed by your Surgeon.     Splint / Cast    Do NOT remove your splint/cast.  Keep your splint/cast clean and dry.  If your splint/cast gets wet, contact your surgeon team.     General info for SNF    Length of Stay Estimate: Short Term Care: Estimated # of Days <30  Condition at Discharge: Improving  Level of care:skilled   Rehabilitation Potential: Good  Admission H&P remains valid and up-to-date: Yes  Recent Chemotherapy: N/A  Use Nursing Home Standing Orders: Yes     Mantoux instructions    Give two-step Mantoux (PPD) Per Facility Policy Yes     Follow Up and recommended labs and tests    Follow up with Nursing home physician.  No follow up labs or test are needed.  Follow-up with orthopedics as outlined     Activity - Up ad lisa    Nonweightbearing on the right leg.  Keep right leg elevated when resting in the bed or chair     Weight bearing status     Reason for your hospital stay    Right ankle fracture     Daily weights    Call Provider for weight gain of more than 2 pounds per day or 5 pounds per week.     Full Code     Physical Therapy Adult Consult    evaluate and treat as clinically indicated.    Reason:  Right ankle fracture s/p mechanical fall and ORIF     Occupational Therapy Adult Consult    Evaluate and treat as clinically indicated.    Reason:  Right ankle  fracture s/p mechanical fall and ORIF     Fall precautions     Rolling Knee Walker DME    DME Documentation: Describe the reason for need to support medical necessity: Impaired gait due to Foot/Ankle surgery. Anticipated length of need: 3 months     Walker DME    DME Documentation: Describe the reason for need to support medical necessity: Impaired gait due to Foot/Ankle surgery. Anticipated length of need: 3 months     Discharge Instruction - Regular Diet Adult    Return to your pre-surgery diet unless instructed otherwise     Diet    Follow this diet upon discharge: Orders Placed This Encounter      Advance Diet as Tolerated: Regular Diet Adult      Discharge Instruction - Regular Diet Adult       Significant Results and Procedures   Results for orders placed or performed during the hospital encounter of 08/10/23   XR Tibia and Fibula Right 2 Views    Narrative    EXAM: XR ANKLE RIGHT G/E 3 VIEWS, XR TIBIA AND FIBULA RIGHT 2 VIEWS, XR FEMUR RIGHT 2 VIEWS, XR PELVIS AND HIP RIGHT 2 VIEWS  LOCATION: [facility]  DATE: 8/10/2023    INDICATION: trip and fall, deformity  COMPARISON: None.      Impression    IMPRESSION: The bones are well-mineralized. The bony pelvis is intact. The femoral head and neck are intact. The hip joint is well aligned. The femur is intact. The included portions of the knee joint are well aligned. Overlying splinting material limits   evaluation of the ankle, there is a displaced bimalleolar ankle fracture with lateral talar shift and angulation. Soft tissue swelling surrounds the ankle. The remainder of the tibia and fibula are intact.   Ankle XR, G/E 3 views, right    Narrative    EXAM: XR ANKLE RIGHT G/E 3 VIEWS, XR TIBIA AND FIBULA RIGHT 2 VIEWS, XR FEMUR RIGHT 2 VIEWS, XR PELVIS AND HIP RIGHT 2 VIEWS  LOCATION: [facility]  DATE: 8/10/2023    INDICATION: trip and fall, deformity  COMPARISON: None.      Impression    IMPRESSION: The bones are  well-mineralized. The bony pelvis is intact. The femoral head and neck are intact. The hip joint is well aligned. The femur is intact. The included portions of the knee joint are well aligned. Overlying splinting material limits   evaluation of the ankle, there is a displaced bimalleolar ankle fracture with lateral talar shift and angulation. Soft tissue swelling surrounds the ankle. The remainder of the tibia and fibula are intact.   XR Pelvis and Hip Right 2 Views    Narrative    EXAM: XR ANKLE RIGHT G/E 3 VIEWS, XR TIBIA AND FIBULA RIGHT 2 VIEWS, XR FEMUR RIGHT 2 VIEWS, XR PELVIS AND HIP RIGHT 2 VIEWS  LOCATION: [facility]  DATE: 8/10/2023    INDICATION: trip and fall, deformity  COMPARISON: None.      Impression    IMPRESSION: The bones are well-mineralized. The bony pelvis is intact. The femoral head and neck are intact. The hip joint is well aligned. The femur is intact. The included portions of the knee joint are well aligned. Overlying splinting material limits   evaluation of the ankle, there is a displaced bimalleolar ankle fracture with lateral talar shift and angulation. Soft tissue swelling surrounds the ankle. The remainder of the tibia and fibula are intact.   XR Femur Right 2 Views    Narrative    EXAM: XR ANKLE RIGHT G/E 3 VIEWS, XR TIBIA AND FIBULA RIGHT 2 VIEWS, XR FEMUR RIGHT 2 VIEWS, XR PELVIS AND HIP RIGHT 2 VIEWS  LOCATION: [facility]  DATE: 8/10/2023    INDICATION: trip and fall, deformity  COMPARISON: None.      Impression    IMPRESSION: The bones are well-mineralized. The bony pelvis is intact. The femoral head and neck are intact. The hip joint is well aligned. The femur is intact. The included portions of the knee joint are well aligned. Overlying splinting material limits   evaluation of the ankle, there is a displaced bimalleolar ankle fracture with lateral talar shift and angulation. Soft tissue swelling surrounds the ankle. The  remainder of the tibia and fibula are intact.   XR Chest 1 View    Narrative    EXAM: XR CHEST 1 VIEW  LOCATION: [facility]  DATE: 8/10/2023    INDICATION: Preop.  COMPARISON: None.      Impression    IMPRESSION: Right paratracheal density which could represent vascular structure, however adenopathy not excluded. Mild low lung volumes accentuate heart size and pulmonary vascularity. Heart size and pulmonary vascularity within normal limits. No focal   lung infiltrates. No pneumothorax seen. Osseous structures grossly intact. Visualized upper abdomen unremarkable.   Ankle XR, G/E 3 views, right    Narrative    EXAM: XR ANKLE RIGHT G/E 3 VIEWS  LOCATION: [facility]  DATE: 8/10/2023    INDICATION: Post reduction   portable please  COMPARISON: Radiographs from earlier today.      Impression    IMPRESSION: Closed reduction and splint replacement since the study from earlier today. There is improved position and alignment of the distal tibia and fibula fractures.           XR Ankle Port Right 2 Views    Narrative    EXAM: XR ANKLE PORT RIGHT 2 VIEWS  LOCATION: [facility]  DATE: 8/12/2023    INDICATION: Increased pain with bimaleolar fracture after casting.  COMPARISON: 08/10/2023.      Impression    IMPRESSION: Films taken through casting material obscure some bony detail. The fracture of the distal fibula is still visualized without change in alignment. A fracture at the medial malleolus is difficult to appreciate through the splinting material. No   change from the previous exam.   CT Ankle Right w/o Contrast    Narrative    EXAM: CT ANKLE RIGHT WITHOUT CONTRAST  LOCATION: [facility]  DATE: 08/13/2023    INDICATION: Right ankle pain. Recent fracture.  COMPARISON: 08/02/2023 radiographs.  TECHNIQUE: Noncontrast. Axial, sagittal and coronal thin-section reconstruction. Dose reduction techniques were used.     FINDINGS:  "    BONES:  -Acute comminuted mildly displaced fracture of the lateral malleolus with mild lateral displacement and mild lateral angulation of the fracture fragments. Tiny chronic avulsion fracture of the tip of the lateral malleolus. Acute moderately displaced   fracture of the medial malleolus. Tiny acute fracture of the lateral portion of the posterior malleolus. Small mildly displaced acute fracture of the anterolateral corner of the distal tibia as seen on series 3 image 38. There is mild lateral subluxation   of the talus with slight widening of the medial ankle mortise. The subtalar joints are intact and unremarkable. Tiny intra-articular fracture fragment in the tibiotalar joint.    SOFT TISSUES:  -No muscle atrophy. Moderate soft tissue stranding around the ankle likely secondary to poorly defined blood products.      Impression    IMPRESSION:  1.  Acute comminuted fracture of the lateral malleolus.    2.  Acute moderately displaced transverse fracture of the medial malleolus.    3.  Small acute mildly displaced fracture of the lateral portion of the posterior malleolus.    4.  Small acute mildly displaced fracture of the anterolateral corner of the distal tibia.    5.  Mild lateral subluxation of the talus with mild widening of the medial ankle mortise.       POC US Guidance Needle Placement    Narrative    Ultrasound was performed as guidance to an anesthesia procedure.  Click   \"PACS images\" hyperlink below to view any stored images.  For specific   procedure details, view procedure note authored by anesthesia.   XR Surgery MINDI Fluoro L/T 5 Min    Narrative    This exam was marked as non-reportable because it will not be read by a   radiologist or a [place] non-radiologist provider.         XR Ankle Port Right 2 Views    Narrative    EXAM: XR ANKLE PORT RIGHT 2 VIEWS  LOCATION: [facility]  DATE: 8/15/2023    INDICATION: Status post right bimalleolar open reduction internal " fixation.    COMPARISON: 8/13/2023.      Impression    IMPRESSION: Fixated distal tibia and fibular fractures with splint in place status post ORIF. Alignment has improved.         Discharge Medications   Current Discharge Medication List        START taking these medications    Details   aspirin 81 MG EC tablet Take 1 tablet (81 mg) by mouth 2 times daily  Qty: 60 tablet, Refills: 0    Associated Diagnoses: Closed bimalleolar fracture of right ankle, initial encounter      HYDROmorphone (DILAUDID) 4 MG tablet Take 1 tablet (4 mg) by mouth every 6 hours for 7 days  Qty: 28 tablet, Refills: 0    Associated Diagnoses: Closed bimalleolar fracture of right ankle, initial encounter      hydrOXYzine (ATARAX) 50 MG tablet Take 1 tablet (50 mg) by mouth every 4 hours as needed for anxiety (adjuvant to pain)  Qty: 30 tablet, Refills: 0    Associated Diagnoses: Closed bimalleolar fracture of right ankle, initial encounter      methocarbamol (ROBAXIN) 500 MG tablet Take 1 tablet (500 mg) by mouth every 6 hours  Qty: 15 tablet, Refills: 0    Associated Diagnoses: Closed bimalleolar fracture of right ankle, initial encounter      senna-docusate (SENOKOT-S/PERICOLACE) 8.6-50 MG tablet Take 2 tablets by mouth 2 times daily for 7 days  Qty: 28 tablet, Refills: 0    Associated Diagnoses: Closed bimalleolar fracture of right ankle, initial encounter           CONTINUE these medications which have CHANGED    Details   acetaminophen (TYLENOL) 325 MG tablet Take 3 tablets (975 mg) by mouth every 6 hours  Qty: 60 tablet, Refills: 0    Associated Diagnoses: Closed bimalleolar fracture of right ankle, initial encounter      gabapentin (NEURONTIN) 100 MG capsule Take 7 capsules (700 mg) by mouth 3 times daily for 7 days  Qty: 147 capsule, Refills: 0    Associated Diagnoses: Closed bimalleolar fracture of right ankle, initial encounter           CONTINUE these medications which have NOT CHANGED    Details   albuterol (PROAIR HFA/PROVENTIL  HFA/VENTOLIN HFA) 108 (90 Base) MCG/ACT inhaler Inhale 1 puff into the lungs every 6 hours as needed for shortness of breath or cough      atorvastatin (LIPITOR) 20 MG tablet Take 1 tablet by mouth daily      baclofen (LIORESAL) 10 MG tablet Take 1 tablet by mouth 3 times daily      busPIRone (BUSPAR) 15 MG tablet Take 1 tablet by mouth 2 times daily      cholecalciferol (VITAMIN D3) 25 mcg (1000 units) capsule Take 1 capsule by mouth daily      DULoxetine (CYMBALTA) 60 MG capsule Take 1 capsule by mouth 2 times daily      famotidine (PEPCID) 20 MG tablet Take 1 tablet by mouth At Bedtime      fluticasone-salmeterol (ADVAIR) 250-50 MCG/ACT inhaler Inhale 1 puff into the lungs daily as needed (Shortness of Breath)      loratadine (CLARITIN) 10 MG tablet Take 1 tablet by mouth daily      magnesium oxide 200 MG TABS Take 1 tablet by mouth daily      pramipexole (MIRAPEX) 0.125 MG tablet Take 1 tablet by mouth 2 times daily as needed (Restless Legs)           STOP taking these medications       betamethasone dipropionate (DIPROSONE) 0.05 % external ointment Comments:   Reason for Stopping:         celecoxib (CELEBREX) 200 MG capsule Comments:   Reason for Stopping:         ibuprofen (ADVIL/MOTRIN) 200 MG tablet Comments:   Reason for Stopping:         zolpidem (AMBIEN) 10 MG tablet Comments:   Reason for Stopping:             Allergies   Allergies   Allergen Reactions    Penicillins Rash

## 2023-08-19 ENCOUNTER — PATIENT OUTREACH (OUTPATIENT)
Dept: CARE COORDINATION | Facility: CLINIC | Age: 63
End: 2023-08-19
Payer: COMMERCIAL

## 2023-08-19 NOTE — PROGRESS NOTES
[facility]    Background: Transitional Care Management program identified per system criteria and reviewed by [facility] Resource Center team for possible outreach.    Assessment: Upon chart review, CCRC Team member will not proceed with patient outreach related to this episode of Transitional Care Management program due to reason below:    Non-[facility] TCU: CCRC team member noted patient discharged to TCU/ARU/LTACH. Patient is not established with a [facility] currently supported by Primary Care-Care Coordination therefore handoff to Primary Care-Care Coordination is not appropriate at this time.    Plan: Transitional Care Management episode addressed appropriately per reason noted above.      Gauri Kelly MA  Connected Care Resource [facility], [facility]    *Connected Care Resource Team does NOT follow patient ongoing. Referrals are identified based on internal discharge reports and the outreach is to ensure patient has an understanding of their discharge instructions.

## 2023-08-21 ENCOUNTER — TRANSITIONAL CARE UNIT VISIT (OUTPATIENT)
Dept: GERIATRICS | Facility: CLINIC | Age: 63
End: 2023-08-21
Payer: COMMERCIAL

## 2023-08-21 VITALS
TEMPERATURE: 97.2 F | DIASTOLIC BLOOD PRESSURE: 72 MMHG | WEIGHT: 136.8 LBS | RESPIRATION RATE: 18 BRPM | OXYGEN SATURATION: 95 % | HEART RATE: 70 BPM | SYSTOLIC BLOOD PRESSURE: 113 MMHG | HEIGHT: 63 IN | BODY MASS INDEX: 24.24 KG/M2

## 2023-08-21 DIAGNOSIS — G89.4 CHRONIC PAIN SYNDROME: ICD-10-CM

## 2023-08-21 DIAGNOSIS — F41.8 DEPRESSION WITH ANXIETY: ICD-10-CM

## 2023-08-21 DIAGNOSIS — S82.891D CLOSED FRACTURE OF RIGHT ANKLE WITH ROUTINE HEALING, SUBSEQUENT ENCOUNTER: Primary | ICD-10-CM

## 2023-08-21 DIAGNOSIS — J43.9 PULMONARY EMPHYSEMA, UNSPECIFIED EMPHYSEMA TYPE (H): ICD-10-CM

## 2023-08-21 DIAGNOSIS — F17.200 TOBACCO USE DISORDER: ICD-10-CM

## 2023-08-21 DIAGNOSIS — E78.5 HYPERLIPIDEMIA LDL GOAL <100: ICD-10-CM

## 2023-08-21 PROCEDURE — 99310 SBSQ NF CARE HIGH MDM 45: CPT | Performed by: NURSE PRACTITIONER

## 2023-08-21 NOTE — PROGRESS NOTES
[facility] GERIATRICS  INITIAL VISIT NOTE  August 21, 2023      PRIMARY CARE PROVIDER AND CLINIC:  No Ref-Primary, Physician No address on file    [facility] Medical Record Number:  [id number]  Place of Service where encounter took place:  [facility] AT [facility] ([facility]) [[id number]]    Chief Complaint   Patient presents with    Hospital F/U       HPI:    Everett Dixon is a 63 year old  (1960) female was admitted to the above facility from  [facility]. Hospital stay 08/10/2023 through 08/18/2023 where they were admitted for Right Ankle Fracture  Now admitted to this facility for  rehab, medical management, and nursing care.      History obtained from: facility chart records, facility staff, patient report, [facility] chart review, and [facility] chart review.      Brief Hospital Course: Everett is a 63 year old female with chronic health issues of COPD, Hypertension without a diagnoses, present smoker/cannabis user, and depression.    Everett had a mechanical fall while trying to leave the bathroom at her niece's home and went to the ED for evaluation of her right ankle.  X-ray showed a displaced bimalleolar ankle fracture with lateral talar shift and angulation.  Everett underwent a ORIF of the right ankle.    - NWB status with aggressive elevation  - follow up with [facility] for ongoing management.      Did have elevated blood pressures - suggest pain control and Hydralazine PRN.    Did test positive for cannabis and is a smoker.  Counseling given on stopping and what options there are to help with nicotine desire.     [facility] Course: meeting Everett today as well as all the disciplines - busy day for Everett.  She was sitting on her bed writing a few things down and started to organize her area while talking with this NP.  Did notice either she was very nervous, withdrawal, or a little OCD of how she organized everything and how they all lined up.  After  the visit she was going outside to vape she stated.    Alert and oriented x3.  Very pleasant.  She played with her ace wrap at her toes - folding it over, unfolding and folding multiple times.  Did not unravel but felt that was her nerves as well.      Reviewed her medications as a few have a stop date after 7 days which do not feel is appropriate given her personality.  Either will remove the 7 days or change the dose to a lesser amount or frequency.      Goal is for her to learn to transfer well enough with her non weight bearing status so that she is able to manage at home.  Will be working with PT/OT here at the facility.    CODE STATUS/ADVANCE DIRECTIVES: CPR/Full code     ALLERGIES:  Allergies   Allergen Reactions    Penicillins Rash       PAST MEDICAL HISTORY:   Past Medical History:   Diagnosis Date    Allergic reaction     Arthritis     Depressive disorder     Emphysema lung (H)     Esophageal reflux     Fibromyalgia     High cholesterol     History of pulmonary embolism     Hx of blood clots     PONV (postoperative nausea and vomiting)        PAST SURGICAL HISTORY:   Past Surgical History:   Procedure Laterality Date    GYN SURGERY      HYSTERECTOMY      OPEN REDUCTION INTERNAL FIXATION ANKLE Right 8/15/2023    Procedure: OPEN REDUCTION INTERNAL FIXATION, FRACTURE,RIGHT ANKLE;  Surgeon: Angela Santiago MD;  Location: [facility] [place] OR       FAMILY HISTORY:   No family history on file.      SOCIAL HISTORY:   Patient's living condition: lives alone    MEDICATIONS  Post Discharge Medication Reconciliation Status: discharge medications reconciled and changed, per note/orders.  Current Outpatient Medications   Medication Sig Dispense Refill    acetaminophen (TYLENOL) 325 MG tablet Take 3 tablets (975 mg) by mouth every 6 hours 60 tablet 0    albuterol (PROAIR HFA/PROVENTIL HFA/VENTOLIN HFA) 108 (90 Base) MCG/ACT inhaler Inhale 1 puff into the lungs every 6 hours as needed for shortness of breath or cough       "aspirin 81 MG EC tablet Take 1 tablet (81 mg) by mouth 2 times daily 60 tablet 0    atorvastatin (LIPITOR) 20 MG tablet Take 1 tablet by mouth daily      baclofen (LIORESAL) 10 MG tablet Take 1 tablet by mouth 3 times daily      busPIRone (BUSPAR) 15 MG tablet Take 1 tablet by mouth 2 times daily      cholecalciferol (VITAMIN D3) 25 mcg (1000 units) capsule Take 1 capsule by mouth daily      DULoxetine (CYMBALTA) 60 MG capsule Take 1 capsule by mouth 2 times daily      famotidine (PEPCID) 20 MG tablet Take 1 tablet by mouth At Bedtime      fluticasone-salmeterol (ADVAIR) 250-50 MCG/ACT inhaler Inhale 1 puff into the lungs daily as needed (Shortness of Breath)      gabapentin (NEURONTIN) 100 MG capsule Take 7 capsules (700 mg) by mouth 3 times daily for 7 days 147 capsule 0    HYDROmorphone (DILAUDID) 4 MG tablet Take 1 tablet (4 mg) by mouth every 6 hours for 7 days 28 tablet 0    hydrOXYzine (ATARAX) 50 MG tablet Take 1 tablet (50 mg) by mouth every 4 hours as needed for anxiety (adjuvant to pain) 30 tablet 0    loratadine (CLARITIN) 10 MG tablet Take 1 tablet by mouth daily      magnesium oxide 200 MG TABS Take 1 tablet by mouth daily      methocarbamol (ROBAXIN) 500 MG tablet Take 1 tablet (500 mg) by mouth every 6 hours 15 tablet 0    pramipexole (MIRAPEX) 0.125 MG tablet Take 1 tablet by mouth 2 times daily as needed (Restless Legs)      senna-docusate (SENOKOT-S/PERICOLACE) 8.6-50 MG tablet Take 2 tablets by mouth 2 times daily for 7 days 28 tablet 0       ROS:  10 point ROS neg other than the symptoms noted above in the HPI.      PHYSICAL EXAM:  /72   Pulse 70   Temp 97.2  F (36.2  C)   Resp 18   Ht 1.6 m (5' 3\")   Wt 62.1 kg (136 lb 12.8 oz)   LMP  (LMP Unknown)   SpO2 95%   BMI 24.23 kg/m    Physical Exam  Vitals and nursing note reviewed.   Constitutional:       Appearance: Normal appearance.   HENT:      Head: Normocephalic.      Right Ear: External ear normal.      Left Ear: External ear " normal.      Ears:      Comments: Able to hear normal tones of voice.       Nose: Nose normal.      Mouth/Throat:      Mouth: Mucous membranes are moist.      Pharynx: Oropharynx is clear.   Eyes:      Extraocular Movements: Extraocular movements intact.      Conjunctiva/sclera: Conjunctivae normal.   Cardiovascular:      Rate and Rhythm: Normal rate and regular rhythm.      Heart sounds: Normal heart sounds.   Pulmonary:      Effort: Pulmonary effort is normal.   Abdominal:      General: Abdomen is flat. Bowel sounds are normal.      Palpations: Abdomen is soft.   Musculoskeletal:      Cervical back: Normal range of motion.      Comments: Right leg in a soft ACE wrap cast stretched out in front of her and elevated.  Able to move Upper extremities and left leg without issues.   Skin:     General: Skin is warm and dry.      Comments: Good CMS in right toes   Neurological:      General: No focal deficit present.      Mental Status: She is alert and oriented to person, place, and time.   Psychiatric:         Mood and Affect: Mood normal.         Thought Content: Thought content normal.      Comments: Little OCD qualities        LABORATORY/IMAGING DATA:  Reviewed as per [facility] and/or [facility]    ASSESSMENT/PLAN:  (J31.511G) Closed fracture of right ankle with routine healing, subsequent encounter  (primary encounter diagnosis)  Comment: NBW status to right leg.  Having PT/OT to help learn to transfer independently/safely from surface to surface.    Came with an order for Dilaudid that has a stop at 7 days.  Do not feel this would be good for her and need to put her on a reduced dose after that.  Discussed the change that will occur then after the 7 days.  Has scheduled Tylenol 975mg every 6 hours, and Robaxin 500mg every 6 hours.       On 8/26/23, change Hydromorphone to 2mg po every 4 hours prn.      (J43.9) Pulmonary emphysema, unspecified emphysema type (H)  (F17.200) Tobacco use disorder  Comment: she is a  "little anxious about getting her outside time with vaping.  Everett was given an inhaler from the hospital that is in her possession but also and order for PRN Advair which usually is not PRN.  Pharmacy sent Advair over and had different directions on there than she came with.  Will clarify this today.    Does take Loratadine daily and has a PRN inhaler of Albuterol.    (F41.8) Depression with anxiety  Comment: currently receives Cymbalta 60mg twice a day, Buspar 15mg twice a day and has a Atarax order 50mg every 4 hours prn for anxiety but also states as an adjunct for pain.    (E78.5) Hyperlipidemia LDL goal <100  Comment: does take lipitor 20mg po daily.  No changes.    (G89.4) Chronic pain syndrome  Comment: Everett has a few orders for muscle relaxer and pain control.  Gabapentin 700mg po TID for 7 days.  Asked her about this and she was not aware of a stop date but not a medication to stop abruptly.  She stated was was working with her primary to lower the dosing of this medication.  Decided to lower her further while here and then continue with her primary doctor.    Everett continues to take Baclofen 10mg 3x a day.  Then she has the ones to help her leg due to the fracture.  Hydromorphone, robaxin, Atarax.  She also takes Cymbalta that can help chronic pain as well.      Orders:    Clarify Advair 250/50 1 puff twice a day for COPD  On 8/26/23, start Hydromorphone 2mg po every 4 hours prn for fx right ankle  On Senna-S order remove the stop after \"7 days'.  Will keep this ongoing  On 8/26/23  start Gabapentin 600mg po TID for 10 days then down to 400mg po TID for chronic pain  HgB and BMP next lab day due to fx right ankle and MARVIN    Total time spent with patient visit at the skilled nursing facility was 45 min including patient visit, review of past records, and discussion with nursing. Greater than 50% of total time spent with counseling and coordinating care due to gathering information, discussion of her " medications, goals of care and discharge planning    Electronically signed by:  ALESSIA Merritt CNP

## 2023-08-21 NOTE — LETTER
8/21/2023        RE: Everett Dixon  [address] [facility] [address] [facility] [id number]        No notes on file      Sincerely,        ALESSIA Merritt CNP

## 2023-08-21 NOTE — LETTER
8/21/2023        RE: Everett Dixon  [address] [facility] [address]        [facility] GERIATRICS  INITIAL VISIT NOTE  August 21, 2023      PRIMARY CARE PROVIDER AND CLINIC:  No Ref-Primary, Physician No address on file    [facility] Medical Record Number:  [id number]  [facility] ([facility]) [[id number]]    Chief Complaint   Patient presents with     Hospital F/U       HPI:    Everett Dixon is a 63 year old  (1960) female was admitted to the above facility from  [facility]. Hospital stay 08/10/2023 through 08/18/2023 where they were admitted for Right Ankle Fracture  Now admitted to this facility for  rehab, medical management, and nursing care.      History obtained from: facility chart records, facility staff, patient report, [facility] chart review, and Care [facility] chart review.      Brief Hospital Course: Everett is a 63 year old female with chronic health issues of COPD, Hypertension without a diagnoses, present smoker/cannabis user, and depression.    Everett had a mechanical fall while trying to leave the bathroom at her niece's home and went to the ED for evaluation of her right ankle.  X-ray showed a displaced bimalleolar ankle fracture with lateral talar shift and angulation.  Everett underwent a ORIF of the right ankle.    - NWB status with aggressive elevation  - follow up with [facility] for ongoing management.      Did have elevated blood pressures - suggest pain control and Hydralazine PRN.    Did test positive for cannabis and is a smoker.  Counseling given on stopping and what options there are to help with nicotine desire.     TCU Course: meeting Everett today as well as all the disciplines - busy day for Everett.  She was sitting on her bed writing a few things down and started to organize her area while talking with this NP.  Did notice either she was very nervous,  withdrawal, or a little OCD of how she organized everything and how they all lined up.  After the visit she was going outside to vape she stated.    Alert and oriented x3.  Very pleasant.  She played with her ace wrap at her toes - folding it over, unfolding and folding multiple times.  Did not unravel but felt that was her nerves as well.      Reviewed her medications as a few have a stop date after 7 days which do not feel is appropriate given her personality.  Either will remove the 7 days or change the dose to a lesser amount or frequency.      Goal is for her to learn to transfer well enough with her non weight bearing status so that she is able to manage at home.  Will be working with PT/OT here at the facility.    CODE STATUS/ADVANCE DIRECTIVES: CPR/Full code     ALLERGIES:  Allergies   Allergen Reactions     Penicillins Rash       PAST MEDICAL HISTORY:   Past Medical History:   Diagnosis Date     Allergic reaction      Arthritis      Depressive disorder      Emphysema lung (H)      Esophageal reflux      Fibromyalgia      High cholesterol      History of pulmonary embolism      Hx of blood clots      PONV (postoperative nausea and vomiting)        PAST SURGICAL HISTORY:   Past Surgical History:   Procedure Laterality Date     GYN SURGERY       HYSTERECTOMY       OPEN REDUCTION INTERNAL FIXATION ANKLE Right 8/15/2023    Procedure: OPEN REDUCTION INTERNAL FIXATION, FRACTURE,RIGHT ANKLE;  Surgeon: Angela Santiago MD;  Location: [facility] [place] OR       FAMILY HISTORY:   No family history on file.      SOCIAL HISTORY:   Patient's living condition: lives alone    MEDICATIONS  Post Discharge Medication Reconciliation Status: discharge medications reconciled and changed, per note/orders.  Current Outpatient Medications   Medication Sig Dispense Refill     acetaminophen (TYLENOL) 325 MG tablet Take 3 tablets (975 mg) by mouth every 6 hours 60 tablet 0     albuterol (PROAIR HFA/PROVENTIL HFA/VENTOLIN HFA) 108 (90 Base)  "MCG/ACT inhaler Inhale 1 puff into the lungs every 6 hours as needed for shortness of breath or cough       aspirin 81 MG EC tablet Take 1 tablet (81 mg) by mouth 2 times daily 60 tablet 0     atorvastatin (LIPITOR) 20 MG tablet Take 1 tablet by mouth daily       baclofen (LIORESAL) 10 MG tablet Take 1 tablet by mouth 3 times daily       busPIRone (BUSPAR) 15 MG tablet Take 1 tablet by mouth 2 times daily       cholecalciferol (VITAMIN D3) 25 mcg (1000 units) capsule Take 1 capsule by mouth daily       DULoxetine (CYMBALTA) 60 MG capsule Take 1 capsule by mouth 2 times daily       famotidine (PEPCID) 20 MG tablet Take 1 tablet by mouth At Bedtime       fluticasone-salmeterol (ADVAIR) 250-50 MCG/ACT inhaler Inhale 1 puff into the lungs daily as needed (Shortness of Breath)       gabapentin (NEURONTIN) 100 MG capsule Take 7 capsules (700 mg) by mouth 3 times daily for 7 days 147 capsule 0     HYDROmorphone (DILAUDID) 4 MG tablet Take 1 tablet (4 mg) by mouth every 6 hours for 7 days 28 tablet 0     hydrOXYzine (ATARAX) 50 MG tablet Take 1 tablet (50 mg) by mouth every 4 hours as needed for anxiety (adjuvant to pain) 30 tablet 0     loratadine (CLARITIN) 10 MG tablet Take 1 tablet by mouth daily       magnesium oxide 200 MG TABS Take 1 tablet by mouth daily       methocarbamol (ROBAXIN) 500 MG tablet Take 1 tablet (500 mg) by mouth every 6 hours 15 tablet 0     pramipexole (MIRAPEX) 0.125 MG tablet Take 1 tablet by mouth 2 times daily as needed (Restless Legs)       senna-docusate (SENOKOT-S/PERICOLACE) 8.6-50 MG tablet Take 2 tablets by mouth 2 times daily for 7 days 28 tablet 0       ROS:  10 point ROS neg other than the symptoms noted above in the HPI.      PHYSICAL EXAM:  /72   Pulse 70   Temp 97.2  F (36.2  C)   Resp 18   Ht 1.6 m (5' 3\")   Wt 62.1 kg (136 lb 12.8 oz)   LMP  (LMP Unknown)   SpO2 95%   BMI 24.23 kg/m    Physical Exam  Vitals and nursing note reviewed.   Constitutional:       " Appearance: Normal appearance.   HENT:      Head: Normocephalic.      Right Ear: External ear normal.      Left Ear: External ear normal.      Ears:      Comments: Able to hear normal tones of voice.       Nose: Nose normal.      Mouth/Throat:      Mouth: Mucous membranes are moist.      Pharynx: Oropharynx is clear.   Eyes:      Extraocular Movements: Extraocular movements intact.      Conjunctiva/sclera: Conjunctivae normal.   Cardiovascular:      Rate and Rhythm: Normal rate and regular rhythm.      Heart sounds: Normal heart sounds.   Pulmonary:      Effort: Pulmonary effort is normal.   Abdominal:      General: Abdomen is flat. Bowel sounds are normal.      Palpations: Abdomen is soft.   Musculoskeletal:      Cervical back: Normal range of motion.      Comments: Right leg in a soft ACE wrap cast stretched out in front of her and elevated.  Able to move Upper extremities and left leg without issues.   Skin:     General: Skin is warm and dry.      Comments: Good CMS in right toes   Neurological:      General: No focal deficit present.      Mental Status: She is alert and oriented to person, place, and time.   Psychiatric:         Mood and Affect: Mood normal.         Thought Content: Thought content normal.      Comments: Little OCD qualities        LABORATORY/IMAGING DATA:  [facility]    ASSESSMENT/PLAN:  (S65.018M) Closed fracture of right ankle with routine healing, subsequent encounter  (primary encounter diagnosis)  Comment: NBW status to right leg.  Having PT/OT to help learn to transfer independently/safely from surface to surface.    Came with an order for Dilaudid that has a stop at 7 days.  Do not feel this would be good for her and need to put her on a reduced dose after that.  Discussed the change that will occur then after the 7 days.  Has scheduled Tylenol 975mg every 6 hours, and Robaxin 500mg every 6 hours.       On 8/26/23, change Hydromorphone to 2mg po every 4  "hours prn.      (J43.9) Pulmonary emphysema, unspecified emphysema type (H)  (F17.200) Tobacco use disorder  Comment: she is a little anxious about getting her outside time with vaping.  Everett was given an inhaler from the hospital that is in her possession but also and order for PRN Advair which usually is not PRN.  Pharmacy sent Advair over and had different directions on there than she came with.  Will clarify this today.    Does take Loratadine daily and has a PRN inhaler of Albuterol.    (F41.8) Depression with anxiety  Comment: currently receives Cymbalta 60mg twice a day, Buspar 15mg twice a day and has a Atarax order 50mg every 4 hours prn for anxiety but also states as an adjunct for pain.    (E78.5) Hyperlipidemia LDL goal <100  Comment: does take lipitor 20mg po daily.  No changes.    (G89.4) Chronic pain syndrome  Comment: Everett has a few orders for muscle relaxer and pain control.  Gabapentin 700mg po TID for 7 days.  Asked her about this and she was not aware of a stop date but not a medication to stop abruptly.  She stated was was working with her primary to lower the dosing of this medication.  Decided to lower her further while here and then continue with her primary doctor.    Everett continues to take Baclofen 10mg 3x a day.  Then she has the ones to help her leg due to the fracture.  Hydromorphone, robaxin, Atarax.  She also takes Cymbalta that can help chronic pain as well.      Orders:    Clarify Advair 250/50 1 puff twice a day for COPD  On 8/26/23, start Hydromorphone 2mg po every 4 hours prn for fx right ankle  On Senna-S order remove the stop after \"7 days'.  Will keep this ongoing  On 8/26/23  start Gabapentin 600mg po TID for 10 days then down to 400mg po TID for chronic pain  HgB and BMP next lab day due to fx right ankle and MARVIN    Total time spent with patient visit at the skilled nursing facility was 45 min including patient visit, review of past records, and discussion with nursing. " Greater than 50% of total time spent with counseling and coordinating care due to gathering information, discussion of her medications, goals of care and discharge planning    Electronically signed by:  ALESSIA Merritt CNP                Sincerely,        ALESSIA Merritt CNP

## 2023-08-23 ENCOUNTER — LAB REQUISITION (OUTPATIENT)
Dept: LAB | Facility: CLINIC | Age: 63
End: 2023-08-23
Payer: COMMERCIAL

## 2023-08-23 DIAGNOSIS — N17.9 ACUTE KIDNEY FAILURE, UNSPECIFIED (H): ICD-10-CM

## 2023-08-24 DIAGNOSIS — S82.841A CLOSED BIMALLEOLAR FRACTURE OF RIGHT ANKLE, INITIAL ENCOUNTER: Primary | ICD-10-CM

## 2023-08-24 LAB
ANION GAP SERPL CALCULATED.3IONS-SCNC: 15 MMOL/L (ref 7–15)
BUN SERPL-MCNC: 25.2 MG/DL (ref 8–23)
CALCIUM SERPL-MCNC: 9.9 MG/DL (ref 8.8–10.2)
CHLORIDE SERPL-SCNC: 105 MMOL/L (ref 98–107)
CREAT SERPL-MCNC: 0.81 MG/DL (ref 0.51–0.95)
DEPRECATED HCO3 PLAS-SCNC: 22 MMOL/L (ref 22–29)
GFR SERPL CREATININE-BSD FRML MDRD: 82 ML/MIN/1.73M2
GLUCOSE SERPL-MCNC: 89 MG/DL (ref 70–99)
HGB BLD-MCNC: 10.3 G/DL (ref 11.7–15.7)
POTASSIUM SERPL-SCNC: 4.6 MMOL/L (ref 3.4–5.3)
SODIUM SERPL-SCNC: 142 MMOL/L (ref 136–145)

## 2023-08-24 PROCEDURE — P9603 ONE-WAY ALLOW PRORATED MILES: HCPCS | Performed by: FAMILY MEDICINE

## 2023-08-24 PROCEDURE — 80048 BASIC METABOLIC PNL TOTAL CA: CPT | Performed by: FAMILY MEDICINE

## 2023-08-24 PROCEDURE — 85018 HEMOGLOBIN: CPT | Performed by: FAMILY MEDICINE

## 2023-08-24 PROCEDURE — 36415 COLL VENOUS BLD VENIPUNCTURE: CPT | Performed by: FAMILY MEDICINE

## 2023-08-24 RX ORDER — HYDROMORPHONE HYDROCHLORIDE 2 MG/1
2 TABLET ORAL EVERY 4 HOURS PRN
Qty: 30 TABLET | Refills: 0 | Status: SHIPPED | OUTPATIENT
Start: 2023-08-26

## 2023-08-24 NOTE — TELEPHONE ENCOUNTER
Sent script of 30 tabs of 2mg Dilaudid for new script started on 8/26/23 after the 7 days of 4mg every 6 hours prn    Abiola Morin, ALESSIA CNP

## 2023-08-28 ENCOUNTER — DISCHARGE SUMMARY NURSING HOME (OUTPATIENT)
Dept: GERIATRICS | Facility: CLINIC | Age: 63
End: 2023-08-28
Payer: COMMERCIAL

## 2023-08-28 VITALS
OXYGEN SATURATION: 98 % | HEIGHT: 63 IN | HEART RATE: 72 BPM | DIASTOLIC BLOOD PRESSURE: 82 MMHG | TEMPERATURE: 98 F | SYSTOLIC BLOOD PRESSURE: 141 MMHG | BODY MASS INDEX: 24.77 KG/M2 | WEIGHT: 139.8 LBS | RESPIRATION RATE: 16 BRPM

## 2023-08-28 DIAGNOSIS — S82.891D CLOSED FRACTURE OF RIGHT ANKLE WITH ROUTINE HEALING, SUBSEQUENT ENCOUNTER: Primary | ICD-10-CM

## 2023-08-28 DIAGNOSIS — F41.8 DEPRESSION WITH ANXIETY: ICD-10-CM

## 2023-08-28 DIAGNOSIS — M15.0 PRIMARY OSTEOARTHRITIS INVOLVING MULTIPLE JOINTS: ICD-10-CM

## 2023-08-28 DIAGNOSIS — E78.5 HYPERLIPIDEMIA LDL GOAL <100: ICD-10-CM

## 2023-08-28 DIAGNOSIS — J43.9 PULMONARY EMPHYSEMA, UNSPECIFIED EMPHYSEMA TYPE (H): ICD-10-CM

## 2023-08-28 DIAGNOSIS — K21.00 GASTROESOPHAGEAL REFLUX DISEASE WITH ESOPHAGITIS WITHOUT HEMORRHAGE: ICD-10-CM

## 2023-08-28 DIAGNOSIS — F17.200 TOBACCO USE DISORDER: ICD-10-CM

## 2023-08-28 DIAGNOSIS — Z74.09 IMPAIRED MOBILITY: ICD-10-CM

## 2023-08-28 DIAGNOSIS — R03.0 ELEVATED BLOOD PRESSURE READING WITHOUT DIAGNOSIS OF HYPERTENSION: ICD-10-CM

## 2023-08-28 PROCEDURE — 99316 NF DSCHRG MGMT 30 MIN+: CPT | Performed by: NURSE PRACTITIONER

## 2023-08-28 NOTE — LETTER
"    8/28/2023        RE: Everett Dixon  [address] [facility] [place] [address]        [facility] GERIATRICS DISCHARGE SUMMARY  PATIENT'S NAME: Everett Dixon  YOB: 1960  MEDICAL RECORD NUMBER:  [id number]  Place of Service where encounter took place:  [facility] AT [facility] ([facility]) [[id number]]    PRIMARY CARE PROVIDER AND CLINIC RESPONSIBLE AFTER TRANSFER:   Physician No Ref-Primary, No address on file  uses Clinic out of [place]           Transferring providers: ALESSIA Harper CNP, Joey Tapia MD   Recent Hospitalization/ED:  [facility] stay 08/10/2023 to 08/18/2023.  Date of SNF Admission:  08/18/2023  Date of SNF (anticipated) Discharge:  8/30/2023  Discharged to: previous independent home  Cognitive Scores: BIMS: 14/15  Physical Function:  using a kneeing type walker on wheels  DME: kneeing walker on wheels    CODE STATUS/ADVANCE DIRECTIVES DISCUSSION:  Full Code   ALLERGIES: Penicillins    NURSING FACILITY COURSE   Medication Changes/Rationale:   8/21/23  BMP and HgB on Thursday for MARVIN, on 8/26/23  start Gabapentin 600mg po 3x a day for 10 days, then down to 400mg po 3x a day starting on 9/5/23 dx osteoarthritis, on the Senna-S order, please remove the end date, \"only for 10 days\", on 8/26/23, start Hydromorphone 2mg every 4 hours PRN for right ankle fracture, clarify Advair 250/50 1 puff twice a day for COPD  8/23/23  ok for ICD code: S82.841D for therapy to code.  8/24/23  Orthopedic Appointment:  doppler to rule out DVT, strict NWB to right lower leg, CAM boot on at all times, return in one week for suture removal in [facility].  8/28/23  change Hydroxyzine 50mg po every 4 hours prn with no stop date and ok to send remaining home with Everett.  Ok to discharge home in 2 days with remaining medications.  As of today has 26 Dilaudid left to go home with.  [facility] PT/OT and RN to evaluate for right bimalleolar " fracture, impaired mobility, COPD, anxiety.    Summary of nursing facility stay:   (X13.201P) Closed fracture of right ankle with routine healing, subsequent encounter  (primary encounter diagnosis)  (M15.9) Primary osteoarthritis involving multiple joints  Plan: gabapentin (NEURONTIN) 100 MG capsule  (Z74.09) Impaired mobility  Comment: Everett is a 63 year old female whom is at [facility] with right closed displaced bimalleolar fracture after falling in bathroom as she was trying to leave the area.  Underwent a ORIF on 8/15/23 at [facility].  Everett has been receiving therapies while here and has had one visit with orthopedics for follow up.  Strict Nwb status to the right leg, CAM boot on at all times, can see ACE wrap underneath, and return to clinic this week for suture removal if all looks good.    Therapies helped her get a kneeing wheeled walker and Everett uses that for everywhere.    Currently Everett is on Dilaudid 2mg every 4 hours prn.  Has 26 approx to go home with and she said that was enough.  Knows she will have to get medication elsewhere when she leaves.  She is on many other medications to help with her osteoarthritis.  One item at admission she said was she would like to get on a lower dose of the Gabapentin as she had a stop date for it upon admission.  After one week did lower her to 600mg 3x a day for 10 days and then 400mg po 3x a day.  Will need to work with her regular primary provider for further management.    Asked her to see her primary in the next month or when able to get in to see that person for continuity of care.  Remains on baby ASA daily until 9/15/23 for DVT prevention and Tylenol 975mg every 6 hours for pain control    (J43.9) Pulmonary emphysema, unspecified emphysema type (H)  (F17.200) Tobacco use disorder  Comment: is using Advair 250/50 1 puff twice a day.  She states she has borderline emphysema.  No oxygen.  Continued to smoke out front of building.    (R03.0)  Elevated blood pressure reading without diagnosis of hypertension  Comment: no medications.  This dx was noted in her discharge summary.  Blood pressures daily while here and ranged from 100-130's/60-90's.  Very rare did she have a reading in the 140 or 150's.  Most consistently 120-130s systolic.    No changes.    (K21.00) Gastroesophageal reflux disease with esophagitis without hemorrhage  Comment: remains on Pepcid 20mg po at bedtime.    (E78.5) Hyperlipidemia LDL goal <100  Comment: continue with Atorvastatin 20mg at bedtime.  Monitor outpatient.    (F41.8) Depression with anxiety  Comment: able to see Everett's anxiety prominently upon admission. She played with her ACE bandage the first visit and then received a call that night that she was upset that everything was twisted up.  Her daughter called nursing and was not happy.  Wanted the orthopedic people called but nursing told them they would not do anything.  Not going to send her to the ED.  Needed to be unwrapped and put together again.  Daughter got her mom calmed down.  That same day, this NP watched her organized everything precisely like OCD.  Now has a CAM boot on and so can not play with her soft cast.    Came with the medicine Hydroxyzine and so today removing the stop date on it and allowing her to continue with the medications and go home with remaining amount if able.  She is on Buspar, duloxetine to help her mood.  The Neurontin is for pain but also may reveal more behaviors as it can be a mood stabilizer.       Discharge Medications:  MED REC REQUIRED  Post Medication Reconciliation Status: Medications reconciled for discharge.       Current Outpatient Medications   Medication Sig Dispense Refill     gabapentin (NEURONTIN) 100 MG capsule 600mg by mouth 3 times a day until 9/4/23 and then down to 400mg po three times a day starting on 9/5/23       acetaminophen (TYLENOL) 325 MG tablet Take 3 tablets (975 mg) by mouth every 6 hours 60 tablet 0      albuterol (PROAIR HFA/PROVENTIL HFA/VENTOLIN HFA) 108 (90 Base) MCG/ACT inhaler Inhale 1 puff into the lungs every 6 hours as needed for shortness of breath or cough       aspirin 81 MG EC tablet Take 1 tablet (81 mg) by mouth 2 times daily 60 tablet 0     atorvastatin (LIPITOR) 20 MG tablet Take 1 tablet by mouth daily       baclofen (LIORESAL) 10 MG tablet Take 1 tablet by mouth 3 times daily       busPIRone (BUSPAR) 15 MG tablet Take 1 tablet by mouth 2 times daily       cholecalciferol (VITAMIN D3) 25 mcg (1000 units) capsule Take 1 capsule by mouth daily       DULoxetine (CYMBALTA) 60 MG capsule Take 1 capsule by mouth 2 times daily       famotidine (PEPCID) 20 MG tablet Take 1 tablet by mouth At Bedtime       fluticasone-salmeterol (ADVAIR) 250-50 MCG/ACT inhaler Inhale 1 puff into the lungs daily as needed (Shortness of Breath)       HYDROmorphone (DILAUDID) 2 MG tablet Take 1 tablet (2 mg) by mouth every 4 hours as needed for pain 30 tablet 0     hydrOXYzine (ATARAX) 50 MG tablet Take 1 tablet (50 mg) by mouth every 4 hours as needed for anxiety (adjuvant to pain) 30 tablet 0     loratadine (CLARITIN) 10 MG tablet Take 1 tablet by mouth daily       magnesium oxide 200 MG TABS Take 1 tablet by mouth daily       methocarbamol (ROBAXIN) 500 MG tablet Take 1 tablet (500 mg) by mouth every 6 hours 15 tablet 0     pramipexole (MIRAPEX) 0.125 MG tablet Take 1 tablet by mouth 2 times daily as needed (Restless Legs)         Controlled medications:   Medication: Dilaudid , 26 approx tabs given to patient at the time of discharge to take home     Past Medical History:   Past Medical History:   Diagnosis Date     Allergic reaction      Arthritis      Depressive disorder      Emphysema lung (H)      Esophageal reflux      Fibromyalgia      High cholesterol      History of pulmonary embolism      Hx of blood clots      PONV (postoperative nausea and vomiting)      Physical Exam:   Vitals: BP (!) 141/82   Pulse 72   Temp  "98  F (36.7  C)   Resp 16   Ht 1.6 m (5' 3\")   Wt 63.4 kg (139 lb 12.8 oz)   LMP  (LMP Unknown)   SpO2 98%   BMI 24.76 kg/m    BMI: Body mass index is 24.76 kg/m .  GENERAL APPEARANCE:  Alert, in no distress, anxious, cooperative  EYES:  EOM, conjunctivae, lids, pupils and irises normal, wears corrective lenses  RESP:  respiratory effort and palpation of chest normal, lungs clear to auscultation , no respiratory distress  CV:  Palpation and auscultation of heart done , regular rate and rhythm, no murmur, rub, or gallop, no edema  ABDOMEN:  normal bowel sounds, soft, nontender, no hepatosplenomegaly or other masses, no guarding or rebound  M/S:   Gait and station abnormal NWB on right leg, using a rolling kneeler walker  SKIN:  CAM boot to right leg, can see ACE wrap on underneath, otherwise skin is tan, warm and ry  NEURO:   Cranial nerves 2-12 are normal tested and grossly at patient's baseline  PSYCH:  oriented X 3, normal insight, judgement and memory, anxious     SNF labs: Recent labs in [facility] reviewed by me today.     DISCHARGE PLAN:  Follow up labs: No labs orders/due  Medical Follow Up:      Follow up with primary care provider in 2-4 weeks  [facility] scheduled appointments:   None  Orthopedic visit with [facility] in [place] this week for suture removal  Discharge Services: Home Care:  Occupational Therapy, Physical Therapy, Registered Nurse, and From:  [facility]  Discharge Instructions Verbalized to Patient at Discharge:   Weight bearing restrictions:  strict NWB to right leg.     TOTAL DISCHARGE TIME:   Greater than 30 minutes  Electronically signed by:  ALESSIA Merritt CNP     Documentation of Face to Face and Certification for Home Health Services    I certify that patient: Everett Dixon is under my care and that I, or a nurse practitioner or physician's assistant working with me, had a face-to-face encounter that meets the physician face-to-face encounter requirements " with this patient on: 8/28/2023.    This encounter with the patient was in whole, or in part, for the following medical condition, which is the primary reason for home health care: right closed displaced malleolus fracture, impaired mobility, osteoarthritis, COPD, anxiety    I certify that, based on my findings, the following services are medically necessary home health services: Nursing, Occupational Therapy, and Physical Therapy.    My clinical findings support the need for the above services because: Nurse is needed: To assess vitals, pain control, cardiac and respiratory status after changes in medications or other medical regimen.., Occupational Therapy Services are needed to assess and treat cognitive ability and address ADL safety due to impairment in upper body strength., and Physical Therapy Services are needed to assess and treat the following functional impairments: weight restrictions, endurance, exercises.    Further, I certify that my clinical findings support that this patient is homebound (i.e. absences from home require considerable and taxing effort and are for medical reasons or Gnosticist services or infrequently or of short duration when for other reasons) because: Requires assistance of another person or specialized equipment to access medical services because patient: Requires supervision of another for safe transfer...    Based on the above findings. I certify that this patient is confined to the home and needs intermittent skilled nursing care, physical therapy and/or speech therapy.  The patient is under my care, and I have initiated the establishment of the plan of care.  This patient will be followed by a physician who will periodically review the plan of care.  Physician/Provider to provide follow up care: No Ref-Primary, Physician    Attending hospital physician (the Medicare certified [place] provider): Joey Tapia MD  Physician Signature:    Dr. Joey Tapia MD  Date:  8/28/2023               Sincerely,        ALESSIA Merritt CNP

## 2023-08-28 NOTE — PROGRESS NOTES
"[facility] GERIATRICS DISCHARGE SUMMARY  PATIENT'S NAME: Everett Dixon  YOB: 1960  MEDICAL RECORD NUMBER:  [id number]  Place of Service where encounter took place:  [facility] AT [facility] [[id number]]    PRIMARY CARE PROVIDER AND CLINIC RESPONSIBLE AFTER TRANSFER:   Physician No Ref-Primary, No address on file  uses Clinic out of [place]           Transferring providers: ALESSIA Harper CNP, Joey Tapia MD   Recent Hospitalization/ED:  [facility] stay 08/10/2023 to 08/18/2023.  Date of SNF Admission:  08/18/2023  Date of SNF (anticipated) Discharge:  8/30/2023  Discharged to: previous independent home  Cognitive Scores: BIMS: 14/15  Physical Function:  using a kneeing type walker on wheels  DME: kneeing walker on wheels    CODE STATUS/ADVANCE DIRECTIVES DISCUSSION:  Full Code   ALLERGIES: Penicillins    NURSING FACILITY COURSE   Medication Changes/Rationale:   8/21/23  BMP and HgB on Thursday for MARVIN, on 8/26/23  start Gabapentin 600mg po 3x a day for 10 days, then down to 400mg po 3x a day starting on 9/5/23 dx osteoarthritis, on the Senna-S order, please remove the end date, \"only for 10 days\", on 8/26/23, start Hydromorphone 2mg every 4 hours PRN for right ankle fracture, clarify Advair 250/50 1 puff twice a day for COPD  8/23/23  ok for ICD code: S82.841D for therapy to code.  8/24/23  Orthopedic Appointment:  doppler to rule out DVT, strict NWB to right lower leg, CAM boot on at all times, return in one week for suture removal in [facility].  8/28/23  change Hydroxyzine 50mg po every 4 hours prn with no stop date and ok to send remaining home with Everett.  Ok to discharge home in 2 days with remaining medications.  As of today has 26 Dilaudid left to go home with.  [facility] PT/OT and RN to evaluate for right bimalleolar fracture, impaired mobility, COPD, anxiety.    Summary of nursing facility stay: "   (P38.694G) Closed fracture of right ankle with routine healing, subsequent encounter  (primary encounter diagnosis)  (M15.9) Primary osteoarthritis involving multiple joints  Plan: gabapentin (NEURONTIN) 100 MG capsule  (Z74.09) Impaired mobility  Comment: Everett is a 63 year old female whom is at [facility] with right closed displaced bimalleolar fracture after falling in bathroom as she was trying to leave the area.  Underwent a ORIF on 8/15/23 at [facility].  Everett has been receiving therapies while here and has had one visit with orthopedics for follow up.  Strict Nwb status to the right leg, CAM boot on at all times, can see ACE wrap underneath, and return to clinic this week for suture removal if all looks good.    Therapies helped her get a kneeing wheeled walker and Everett uses that for everywhere.    Currently Everett is on Dilaudid 2mg every 4 hours prn.  Has 26 approx to go home with and she said that was enough.  Knows she will have to get medication elsewhere when she leaves.  She is on many other medications to help with her osteoarthritis.  One item at admission she said was she would like to get on a lower dose of the Gabapentin as she had a stop date for it upon admission.  After one week did lower her to 600mg 3x a day for 10 days and then 400mg po 3x a day.  Will need to work with her regular primary provider for further management.    Asked her to see her primary in the next month or when able to get in to see that person for continuity of care.  Remains on baby ASA daily until 9/15/23 for DVT prevention and Tylenol 975mg every 6 hours for pain control    (J43.9) Pulmonary emphysema, unspecified emphysema type (H)  (F17.200) Tobacco use disorder  Comment: is using Advair 250/50 1 puff twice a day.  She states she has borderline emphysema.  No oxygen.  Continued to smoke out front of building.    (R03.0) Elevated blood pressure reading without diagnosis of hypertension  Comment: no  medications.  This dx was noted in her discharge summary.  Blood pressures daily while here and ranged from 100-130's/60-90's.  Very rare did she have a reading in the 140 or 150's.  Most consistently 120-130s systolic.    No changes.    (K21.00) Gastroesophageal reflux disease with esophagitis without hemorrhage  Comment: remains on Pepcid 20mg po at bedtime.    (E78.5) Hyperlipidemia LDL goal <100  Comment: continue with Atorvastatin 20mg at bedtime.  Monitor outpatient.    (F41.8) Depression with anxiety  Comment: able to see Everett's anxiety prominently upon admission. She played with her ACE bandage the first visit and then received a call that night that she was upset that everything was twisted up.  Her daughter called nursing and was not happy.  Wanted the orthopedic people called but nursing told them they would not do anything.  Not going to send her to the ED.  Needed to be unwrapped and put together again.  Daughter got her mom calmed down.  That same day, this NP watched her organized everything precisely like OCD.  Now has a CAM boot on and so can not play with her soft cast.    Came with the medicine Hydroxyzine and so today removing the stop date on it and allowing her to continue with the medications and go home with remaining amount if able.  She is on Buspar, duloxetine to help her mood.  The Neurontin is for pain but also may reveal more behaviors as it can be a mood stabilizer.       Discharge Medications:  MED REC REQUIRED  Post Medication Reconciliation Status: Medications reconciled for discharge.       Current Outpatient Medications   Medication Sig Dispense Refill    gabapentin (NEURONTIN) 100 MG capsule 600mg by mouth 3 times a day until 9/4/23 and then down to 400mg po three times a day starting on 9/5/23      acetaminophen (TYLENOL) 325 MG tablet Take 3 tablets (975 mg) by mouth every 6 hours 60 tablet 0    albuterol (PROAIR HFA/PROVENTIL HFA/VENTOLIN HFA) 108 (90 Base) MCG/ACT inhaler  "Inhale 1 puff into the lungs every 6 hours as needed for shortness of breath or cough      aspirin 81 MG EC tablet Take 1 tablet (81 mg) by mouth 2 times daily 60 tablet 0    atorvastatin (LIPITOR) 20 MG tablet Take 1 tablet by mouth daily      baclofen (LIORESAL) 10 MG tablet Take 1 tablet by mouth 3 times daily      busPIRone (BUSPAR) 15 MG tablet Take 1 tablet by mouth 2 times daily      cholecalciferol (VITAMIN D3) 25 mcg (1000 units) capsule Take 1 capsule by mouth daily      DULoxetine (CYMBALTA) 60 MG capsule Take 1 capsule by mouth 2 times daily      famotidine (PEPCID) 20 MG tablet Take 1 tablet by mouth At Bedtime      fluticasone-salmeterol (ADVAIR) 250-50 MCG/ACT inhaler Inhale 1 puff into the lungs daily as needed (Shortness of Breath)      HYDROmorphone (DILAUDID) 2 MG tablet Take 1 tablet (2 mg) by mouth every 4 hours as needed for pain 30 tablet 0    hydrOXYzine (ATARAX) 50 MG tablet Take 1 tablet (50 mg) by mouth every 4 hours as needed for anxiety (adjuvant to pain) 30 tablet 0    loratadine (CLARITIN) 10 MG tablet Take 1 tablet by mouth daily      magnesium oxide 200 MG TABS Take 1 tablet by mouth daily      methocarbamol (ROBAXIN) 500 MG tablet Take 1 tablet (500 mg) by mouth every 6 hours 15 tablet 0    pramipexole (MIRAPEX) 0.125 MG tablet Take 1 tablet by mouth 2 times daily as needed (Restless Legs)         Controlled medications:   Medication: Dilaudid , 26 approx tabs given to patient at the time of discharge to take home     Past Medical History:   Past Medical History:   Diagnosis Date    Allergic reaction     Arthritis     Depressive disorder     Emphysema lung (H)     Esophageal reflux     Fibromyalgia     High cholesterol     History of pulmonary embolism     Hx of blood clots     PONV (postoperative nausea and vomiting)      Physical Exam:   Vitals: BP (!) 141/82   Pulse 72   Temp 98  F (36.7  C)   Resp 16   Ht 1.6 m (5' 3\")   Wt 63.4 kg (139 lb 12.8 oz)   LMP  (LMP Unknown)   " SpO2 98%   BMI 24.76 kg/m    BMI: Body mass index is 24.76 kg/m .  GENERAL APPEARANCE:  Alert, in no distress, anxious, cooperative  EYES:  EOM, conjunctivae, lids, pupils and irises normal, wears corrective lenses  RESP:  respiratory effort and palpation of chest normal, lungs clear to auscultation , no respiratory distress  CV:  Palpation and auscultation of heart done , regular rate and rhythm, no murmur, rub, or gallop, no edema  ABDOMEN:  normal bowel sounds, soft, nontender, no hepatosplenomegaly or other masses, no guarding or rebound  M/S:   Gait and station abnormal NWB on right leg, using a rolling kneeler walker  SKIN:  CAM boot to right leg, can see ACE wrap on underneath, otherwise skin is tan, warm and ry  NEURO:   Cranial nerves 2-12 are normal tested and grossly at patient's baseline  PSYCH:  oriented X 3, normal insight, judgement and memory, anxious     SNF labs: Recent labs in [facility] reviewed by me today.     DISCHARGE PLAN:  Follow up labs: No labs orders/due  Medical Follow Up:      Follow up with primary care provider in 2-4 weeks  [facility] scheduled appointments:   None  Orthopedic visit with [facility] in [place] this week for suture removal  Discharge Services: Home Care:  Occupational Therapy, Physical Therapy, Registered Nurse, and From:  [facility]  Discharge Instructions Verbalized to Patient at Discharge:   Weight bearing restrictions:  strict NWB to right leg.     TOTAL DISCHARGE TIME:   Greater than 30 minutes  Electronically signed by:  ALESSIA Merritt CNP     Documentation of Face to Face and Certification for Home Health Services    I certify that patient: Everett Dixon is under my care and that I, or a nurse practitioner or physician's assistant working with me, had a face-to-face encounter that meets the physician face-to-face encounter requirements with this patient on: 8/28/2023.    This encounter with the patient was in whole, or in part, for the  following medical condition, which is the primary reason for home health care: right closed displaced malleolus fracture, impaired mobility, osteoarthritis, COPD, anxiety    I certify that, based on my findings, the following services are medically necessary home health services: Nursing, Occupational Therapy, and Physical Therapy.    My clinical findings support the need for the above services because: Nurse is needed: To assess vitals, pain control, cardiac and respiratory status after changes in medications or other medical regimen.., Occupational Therapy Services are needed to assess and treat cognitive ability and address ADL safety due to impairment in upper body strength., and Physical Therapy Services are needed to assess and treat the following functional impairments: weight restrictions, endurance, exercises.    Further, I certify that my clinical findings support that this patient is homebound (i.e. absences from home require considerable and taxing effort and are for medical reasons or Bahai services or infrequently or of short duration when for other reasons) because: Requires assistance of another person or specialized equipment to access medical services because patient: Requires supervision of another for safe transfer...    Based on the above findings. I certify that this patient is confined to the home and needs intermittent skilled nursing care, physical therapy and/or speech therapy.  The patient is under my care, and I have initiated the establishment of the plan of care.  This patient will be followed by a physician who will periodically review the plan of care.  Physician/Provider to provide follow up care: No Ref-Primary, Physician    Attending hospital physician (the Medicare certified [facility] provider): Joey Tapia MD  Physician Signature:    Dr. Joey Tapia MD  Date: 8/28/2023

## 2023-08-31 PROBLEM — K21.00 GASTROESOPHAGEAL REFLUX DISEASE WITH ESOPHAGITIS WITHOUT HEMORRHAGE: Status: ACTIVE | Noted: 2023-08-31

## 2023-08-31 PROBLEM — E78.5 HYPERLIPIDEMIA LDL GOAL <100: Status: ACTIVE | Noted: 2023-08-31

## 2023-08-31 PROBLEM — J43.9 PULMONARY EMPHYSEMA, UNSPECIFIED EMPHYSEMA TYPE (H): Status: ACTIVE | Noted: 2023-08-31

## 2023-08-31 PROBLEM — F17.200 TOBACCO USE DISORDER: Status: ACTIVE | Noted: 2023-08-31

## 2023-08-31 PROBLEM — F41.8 DEPRESSION WITH ANXIETY: Status: ACTIVE | Noted: 2023-08-31

## 2023-08-31 RX ORDER — GABAPENTIN 100 MG/1
CAPSULE ORAL
Start: 2023-08-26

## (undated) DEVICE — GLOVE BIOGEL PI INDICATOR 9.0 LF  41690

## (undated) DEVICE — SOL NACL 0.9% IRRIG 1000ML BOTTLE 2F7124

## (undated) DEVICE — DRILL BIT SYN QUICK COUPLING 2.0X140MM  323.062

## (undated) DEVICE — PREP CHLORAPREP 26ML TINTED HI-LITE ORANGE 930815

## (undated) DEVICE — SOL WATER IRRIG 1000ML BOTTLE 2F7114

## (undated) DEVICE — ESU PENCIL SMOKE EVAC W/ROCKER SWITCH 0703-047-000

## (undated) DEVICE — DRSG ADAPTIC 3X8" 6113

## (undated) DEVICE — DRILL BIT SYN QUICK COUPLING 2.5X180MM GOLD  310.23

## (undated) DEVICE — CUSTOM PACK LOWER EXTREMITY SOP5BLEHEA

## (undated) DEVICE — DRSG GAUZE 4X4" 3033

## (undated) DEVICE — BANDAGE ELASTIC VELCRO 6IN REB3016

## (undated) DEVICE — PADDING CAST 4IN WEBRIL STRL 2502

## (undated) DEVICE — IMP SCR SYN CORTEX 2.7X12MM SELF TAP SS 202.812: Type: IMPLANTABLE DEVICE | Site: ANKLE | Status: NON-FUNCTIONAL

## (undated) DEVICE — DRSG STERI STRIP 1/2X4" R1547

## (undated) DEVICE — GLOVE BIOGEL PI ULTRATOUCH G SZ 7.0 42170

## (undated) DEVICE — SUTURE VICRYL+ 2-0 27IN SH UND VCP417H

## (undated) DEVICE — CUFF TOURN 18IN STRL DISP

## (undated) DEVICE — DRAPE C-ARM 60X42" 1013

## (undated) DEVICE — GLOVE UNDER INDICATOR PI SZ 7.0 LF 41670

## (undated) DEVICE — IMP SCR SYN CORTEX 3.5X14MM SELF TAP SS 204.814: Type: IMPLANTABLE DEVICE | Site: ANKLE | Status: NON-FUNCTIONAL

## (undated) DEVICE — DRILL BIT SYN QUICK COUPLING 2.7X125MM 315.28

## (undated) DEVICE — SUCTION MANIFOLD NEPTUNE 2 SYS 4 PORT 0702-020-000

## (undated) DEVICE — GOWN LG DISP 9515

## (undated) DEVICE — PLATE GROUNDING ADULT W/CORD 9165L

## (undated) RX ORDER — PROPOFOL 10 MG/ML
INJECTION, EMULSION INTRAVENOUS
Status: DISPENSED
Start: 2023-08-15

## (undated) RX ORDER — ONDANSETRON 2 MG/ML
INJECTION INTRAMUSCULAR; INTRAVENOUS
Status: DISPENSED
Start: 2023-08-15

## (undated) RX ORDER — FENTANYL CITRATE 50 UG/ML
INJECTION, SOLUTION INTRAMUSCULAR; INTRAVENOUS
Status: DISPENSED
Start: 2023-08-15